# Patient Record
Sex: MALE | Race: WHITE | NOT HISPANIC OR LATINO
[De-identification: names, ages, dates, MRNs, and addresses within clinical notes are randomized per-mention and may not be internally consistent; named-entity substitution may affect disease eponyms.]

---

## 2018-01-11 ENCOUNTER — APPOINTMENT (OUTPATIENT)
Dept: UROLOGY | Facility: CLINIC | Age: 78
End: 2018-01-11
Payer: MEDICARE

## 2018-01-11 DIAGNOSIS — R97.20 ELEVATED PROSTATE, SPECIFIC ANTIGEN [PSA]: ICD-10-CM

## 2018-01-11 PROCEDURE — 99214 OFFICE O/P EST MOD 30 MIN: CPT

## 2018-01-12 LAB
APPEARANCE: CLEAR
BACTERIA: NEGATIVE
BILIRUBIN URINE: NEGATIVE
BLOOD URINE: NEGATIVE
COLOR: YELLOW
GLUCOSE QUALITATIVE U: 100 MG/DL
HYALINE CASTS: 0 /LPF
KETONES URINE: ABNORMAL
LEUKOCYTE ESTERASE URINE: NEGATIVE
MICROSCOPIC-UA: NORMAL
NITRITE URINE: NEGATIVE
PH URINE: 5
PROTEIN URINE: NEGATIVE MG/DL
PSA FREE FLD-MCNC: 11
PSA FREE SERPL-MCNC: 3.07 NG/ML
PSA SERPL-MCNC: 27.82 NG/ML
RED BLOOD CELLS URINE: 2 /HPF
SPECIFIC GRAVITY URINE: 1.03
SQUAMOUS EPITHELIAL CELLS: 1 /HPF
UROBILINOGEN URINE: NEGATIVE MG/DL
WHITE BLOOD CELLS URINE: 1 /HPF

## 2018-01-13 LAB — CORE LAB FLUID CYTOLOGY: NORMAL

## 2018-01-25 ENCOUNTER — APPOINTMENT (OUTPATIENT)
Dept: UROLOGY | Facility: CLINIC | Age: 78
End: 2018-01-25

## 2019-01-10 ENCOUNTER — APPOINTMENT (OUTPATIENT)
Dept: UROLOGY | Facility: CLINIC | Age: 79
End: 2019-01-10
Payer: MEDICARE

## 2019-01-10 PROCEDURE — 99214 OFFICE O/P EST MOD 30 MIN: CPT

## 2019-01-17 LAB
PSA FREE FLD-MCNC: 7 %
PSA FREE SERPL-MCNC: 6.05 NG/ML
PSA SERPL-MCNC: 88.65 NG/ML

## 2019-02-04 ENCOUNTER — APPOINTMENT (OUTPATIENT)
Dept: DERMATOLOGY | Facility: CLINIC | Age: 79
End: 2019-02-04
Payer: MEDICARE

## 2019-02-04 VITALS — HEIGHT: 70 IN | BODY MASS INDEX: 32.93 KG/M2 | WEIGHT: 230 LBS

## 2019-02-04 DIAGNOSIS — Z00.00 ENCOUNTER FOR GENERAL ADULT MEDICAL EXAMINATION W/OUT ABNORMAL FINDINGS: ICD-10-CM

## 2019-02-04 PROCEDURE — 99213 OFFICE O/P EST LOW 20 MIN: CPT

## 2019-02-04 PROCEDURE — 99203 OFFICE O/P NEW LOW 30 MIN: CPT

## 2019-02-04 NOTE — HISTORY OF PRESENT ILLNESS
[FreeTextEntry1] : Rash. [de-identified] : Diffusely, on the trunk, worse with the cold weather, but exacerbating/remitting course over the past year.  Self tx with aquaphor and hydrocortisone without relief.

## 2019-02-04 NOTE — ASSESSMENT
[FreeTextEntry1] : Eczema\par Cutivate ointment bid\par Aquaphor bid more liberally.\par f/u in 1 month.

## 2019-02-04 NOTE — PHYSICAL EXAM
[Alert] : alert [Oriented x 3] : ~L oriented x 3 [Well Nourished] : well nourished [Full Body Skin Exam Performed] : performed [Eyelids] : Eyelids [Ears] : Ears [Lips] : Lips [Neck] : Neck [FreeTextEntry3] : eczematous patches of the back, shoulder, chest, with mild involvement of the UE's and LE's.

## 2019-02-28 ENCOUNTER — APPOINTMENT (OUTPATIENT)
Dept: DERMATOLOGY | Facility: CLINIC | Age: 79
End: 2019-02-28
Payer: MEDICARE

## 2019-02-28 DIAGNOSIS — L08.9 LOCAL INFECTION OF THE SKIN AND SUBCUTANEOUS TISSUE, UNSPECIFIED: ICD-10-CM

## 2019-02-28 PROCEDURE — 17110 DESTRUCTION B9 LES UP TO 14: CPT

## 2019-02-28 PROCEDURE — 99213 OFFICE O/P EST LOW 20 MIN: CPT | Mod: 25

## 2019-02-28 RX ORDER — METHYLPREDNISOLONE 4 MG/1
4 TABLET ORAL
Qty: 21 | Refills: 0 | Status: COMPLETED | COMMUNITY
Start: 2018-12-28

## 2019-02-28 RX ORDER — BLOOD SUGAR DIAGNOSTIC
STRIP MISCELLANEOUS
Qty: 300 | Refills: 0 | Status: COMPLETED | COMMUNITY
Start: 2018-03-14

## 2019-02-28 RX ORDER — FLUTICASONE PROPIONATE 50 UG/1
50 SPRAY, METERED NASAL
Qty: 16 | Refills: 0 | Status: COMPLETED | COMMUNITY
Start: 2018-12-28

## 2019-02-28 RX ORDER — AMOXICILLIN 500 MG/1
500 CAPSULE ORAL
Qty: 30 | Refills: 0 | Status: COMPLETED | COMMUNITY
Start: 2018-12-28

## 2019-02-28 NOTE — ASSESSMENT
[FreeTextEntry1] : 1) rash/eczema-\par -c/w fluticasone PRN\par \par 2) skin infection- areas appears impetiginized\par -start doxycycline 100 mg PO BID x10 days; SED\par -culture taken\par -given instructions to go to ED if worsening, if fevers, or trouble swallowing; patient verbalized understanding\par \par 3) ISK-\par The specified lesions were treated with liquid nitrogen cryotherapy.  Discussed risks including pain, blistering, crusting, discoloration, recurrence.\par

## 2019-02-28 NOTE — HISTORY OF PRESENT ILLNESS
[FreeTextEntry1] : possible infection [de-identified] : patient here for f.u rash. having improvement fluticasone but recently noticed some pain and pus on left cheek. feels it is tender.\par also with irritated brown plaques on body.

## 2019-02-28 NOTE — PHYSICAL EXAM
[Alert] : alert [Oriented x 3] : ~L oriented x 3 [Well Nourished] : well nourished [Full Body Skin Exam Performed] : performed [Eyelids] : Eyelids [Ears] : Ears [Lips] : Lips [Neck] : Neck [FreeTextEntry3] : eczematous patches of the back, shoulder, chest, with mild involvement of the UE's and LE's.\par tender plaque on left cheek with some pus\par + inflamed, waxy, keratotic papule on right shoulder

## 2019-03-02 ENCOUNTER — INPATIENT (INPATIENT)
Facility: HOSPITAL | Age: 79
LOS: 3 days | Discharge: HOME | End: 2019-03-06
Attending: FAMILY MEDICINE | Admitting: FAMILY MEDICINE
Payer: MEDICARE

## 2019-03-02 VITALS — WEIGHT: 229.94 LBS

## 2019-03-02 DIAGNOSIS — L03.211 CELLULITIS OF FACE: ICD-10-CM

## 2019-03-02 DIAGNOSIS — R73.09 OTHER ABNORMAL GLUCOSE: ICD-10-CM

## 2019-03-02 LAB
ALBUMIN SERPL ELPH-MCNC: 3.7 G/DL — SIGNIFICANT CHANGE UP (ref 3.3–5)
ALP SERPL-CCNC: 60 U/L — SIGNIFICANT CHANGE UP (ref 40–120)
ALT FLD-CCNC: 36 U/L — SIGNIFICANT CHANGE UP (ref 12–78)
ANION GAP SERPL CALC-SCNC: 5 MMOL/L — SIGNIFICANT CHANGE UP (ref 5–17)
APPEARANCE UR: CLEAR — SIGNIFICANT CHANGE UP
APTT BLD: 31.3 SEC — SIGNIFICANT CHANGE UP (ref 27.5–36.3)
AST SERPL-CCNC: 22 U/L — SIGNIFICANT CHANGE UP (ref 15–37)
BACTERIA # UR AUTO: ABNORMAL
BASOPHILS # BLD AUTO: 0.05 K/UL — SIGNIFICANT CHANGE UP (ref 0–0.2)
BASOPHILS NFR BLD AUTO: 0.9 % — SIGNIFICANT CHANGE UP (ref 0–2)
BILIRUB SERPL-MCNC: 1.5 MG/DL — HIGH (ref 0.2–1.2)
BILIRUB UR-MCNC: NEGATIVE — SIGNIFICANT CHANGE UP
BUN SERPL-MCNC: 17 MG/DL — SIGNIFICANT CHANGE UP (ref 7–23)
CALCIUM SERPL-MCNC: 8.5 MG/DL — SIGNIFICANT CHANGE UP (ref 8.5–10.1)
CHLORIDE SERPL-SCNC: 102 MMOL/L — SIGNIFICANT CHANGE UP (ref 96–108)
CO2 SERPL-SCNC: 31 MMOL/L — SIGNIFICANT CHANGE UP (ref 22–31)
COLOR SPEC: YELLOW — SIGNIFICANT CHANGE UP
CREAT SERPL-MCNC: 1.28 MG/DL — SIGNIFICANT CHANGE UP (ref 0.5–1.3)
DIFF PNL FLD: NEGATIVE — SIGNIFICANT CHANGE UP
EOSINOPHIL # BLD AUTO: 0.1 K/UL — SIGNIFICANT CHANGE UP (ref 0–0.5)
EOSINOPHIL NFR BLD AUTO: 1.7 % — SIGNIFICANT CHANGE UP (ref 0–6)
EPI CELLS # UR: SIGNIFICANT CHANGE UP
GLUCOSE BLDC GLUCOMTR-MCNC: 116 MG/DL — HIGH (ref 70–99)
GLUCOSE SERPL-MCNC: 135 MG/DL — HIGH (ref 70–99)
GLUCOSE UR QL: 250 MG/DL
HCT VFR BLD CALC: 49.8 % — SIGNIFICANT CHANGE UP (ref 39–50)
HGB BLD-MCNC: 16.1 G/DL — SIGNIFICANT CHANGE UP (ref 13–17)
IMM GRANULOCYTES NFR BLD AUTO: 0.3 % — SIGNIFICANT CHANGE UP (ref 0–1.5)
INR BLD: 1.03 RATIO — SIGNIFICANT CHANGE UP (ref 0.88–1.16)
KETONES UR-MCNC: ABNORMAL
LACTATE SERPL-SCNC: 1.8 MMOL/L — SIGNIFICANT CHANGE UP (ref 0.7–2)
LEUKOCYTE ESTERASE UR-ACNC: NEGATIVE — SIGNIFICANT CHANGE UP
LYMPHOCYTES # BLD AUTO: 0.68 K/UL — LOW (ref 1–3.3)
LYMPHOCYTES # BLD AUTO: 11.6 % — LOW (ref 13–44)
MCHC RBC-ENTMCNC: 31.1 PG — SIGNIFICANT CHANGE UP (ref 27–34)
MCHC RBC-ENTMCNC: 32.3 GM/DL — SIGNIFICANT CHANGE UP (ref 32–36)
MCV RBC AUTO: 96.3 FL — SIGNIFICANT CHANGE UP (ref 80–100)
MONOCYTES # BLD AUTO: 0.92 K/UL — HIGH (ref 0–0.9)
MONOCYTES NFR BLD AUTO: 15.7 % — HIGH (ref 2–14)
NEUTROPHILS # BLD AUTO: 4.1 K/UL — SIGNIFICANT CHANGE UP (ref 1.8–7.4)
NEUTROPHILS NFR BLD AUTO: 69.8 % — SIGNIFICANT CHANGE UP (ref 43–77)
NITRITE UR-MCNC: NEGATIVE — SIGNIFICANT CHANGE UP
NRBC # BLD: 0 /100 WBCS — SIGNIFICANT CHANGE UP (ref 0–0)
PH UR: 6 — SIGNIFICANT CHANGE UP (ref 5–8)
PLATELET # BLD AUTO: 177 K/UL — SIGNIFICANT CHANGE UP (ref 150–400)
POTASSIUM SERPL-MCNC: 4.3 MMOL/L — SIGNIFICANT CHANGE UP (ref 3.5–5.3)
POTASSIUM SERPL-SCNC: 4.3 MMOL/L — SIGNIFICANT CHANGE UP (ref 3.5–5.3)
PROT SERPL-MCNC: 7.6 GM/DL — SIGNIFICANT CHANGE UP (ref 6–8.3)
PROT UR-MCNC: 30 MG/DL
PROTHROM AB SERPL-ACNC: 11.4 SEC — SIGNIFICANT CHANGE UP (ref 10–12.9)
RBC # BLD: 5.17 M/UL — SIGNIFICANT CHANGE UP (ref 4.2–5.8)
RBC # FLD: 14.2 % — SIGNIFICANT CHANGE UP (ref 10.3–14.5)
RBC CASTS # UR COMP ASSIST: SIGNIFICANT CHANGE UP /HPF (ref 0–4)
SODIUM SERPL-SCNC: 138 MMOL/L — SIGNIFICANT CHANGE UP (ref 135–145)
SP GR SPEC: 1.02 — SIGNIFICANT CHANGE UP (ref 1.01–1.02)
UROBILINOGEN FLD QL: NEGATIVE MG/DL — SIGNIFICANT CHANGE UP
WBC # BLD: 5.87 K/UL — SIGNIFICANT CHANGE UP (ref 3.8–10.5)
WBC # FLD AUTO: 5.87 K/UL — SIGNIFICANT CHANGE UP (ref 3.8–10.5)
WBC UR QL: SIGNIFICANT CHANGE UP

## 2019-03-02 PROCEDURE — 93010 ELECTROCARDIOGRAM REPORT: CPT

## 2019-03-02 PROCEDURE — 99284 EMERGENCY DEPT VISIT MOD MDM: CPT

## 2019-03-02 RX ORDER — DEXTROSE 50 % IN WATER 50 %
12.5 SYRINGE (ML) INTRAVENOUS ONCE
Qty: 0 | Refills: 0 | Status: DISCONTINUED | OUTPATIENT
Start: 2019-03-02 | End: 2019-03-04

## 2019-03-02 RX ORDER — VANCOMYCIN HCL 1 G
1000 VIAL (EA) INTRAVENOUS ONCE
Qty: 0 | Refills: 0 | Status: COMPLETED | OUTPATIENT
Start: 2019-03-02 | End: 2019-03-02

## 2019-03-02 RX ORDER — DEXTROSE 50 % IN WATER 50 %
25 SYRINGE (ML) INTRAVENOUS ONCE
Qty: 0 | Refills: 0 | Status: DISCONTINUED | OUTPATIENT
Start: 2019-03-02 | End: 2019-03-04

## 2019-03-02 RX ORDER — HEPARIN SODIUM 5000 [USP'U]/ML
5000 INJECTION INTRAVENOUS; SUBCUTANEOUS EVERY 12 HOURS
Qty: 0 | Refills: 0 | Status: DISCONTINUED | OUTPATIENT
Start: 2019-03-02 | End: 2019-03-06

## 2019-03-02 RX ORDER — GLUCAGON INJECTION, SOLUTION 0.5 MG/.1ML
1 INJECTION, SOLUTION SUBCUTANEOUS ONCE
Qty: 0 | Refills: 0 | Status: DISCONTINUED | OUTPATIENT
Start: 2019-03-02 | End: 2019-03-06

## 2019-03-02 RX ORDER — SODIUM CHLORIDE 9 MG/ML
1000 INJECTION, SOLUTION INTRAVENOUS
Qty: 0 | Refills: 0 | Status: DISCONTINUED | OUTPATIENT
Start: 2019-03-02 | End: 2019-03-04

## 2019-03-02 RX ORDER — SODIUM CHLORIDE 9 MG/ML
1000 INJECTION INTRAMUSCULAR; INTRAVENOUS; SUBCUTANEOUS
Qty: 0 | Refills: 0 | Status: DISCONTINUED | OUTPATIENT
Start: 2019-03-02 | End: 2019-03-04

## 2019-03-02 RX ORDER — DEXTROSE 50 % IN WATER 50 %
15 SYRINGE (ML) INTRAVENOUS ONCE
Qty: 0 | Refills: 0 | Status: DISCONTINUED | OUTPATIENT
Start: 2019-03-02 | End: 2019-03-04

## 2019-03-02 RX ADMIN — Medication 1000 MILLIGRAM(S): at 18:55

## 2019-03-02 RX ADMIN — Medication 250 MILLIGRAM(S): at 17:55

## 2019-03-02 NOTE — ED STATDOCS - PROGRESS NOTE DETAILS
78 yr. old male PMH: Eczema, Skull Fracture 1970, Type 2 Diabetes presents to ED with pruritic rash ,pain and swelling to left side of face and ear, onset 3 days ago. Reports eczema started on back and now thighs and both arms. Seen by Dr. Tsai's PA and Rx. Doxycycline on Thursday. No fever or chills. Seen and examined by attending in Intake. Plan: IV,IVF, labs, Vancomycin. Will F/U with results and re evaluate. Clarisse RUIZ 78 yr. old male PMH: Eczema, Skull Fracture 1971, Type 2 Diabetes presents to ED with pruritic rash ,pain and swelling to left side of face and ear, onset 3 days ago. Reports eczema started on back and now thighs and both arms. Seen by Dr. Tsai's PA and Rx. Doxycycline on Thursday. No fever + chills. Seen and examined by attending in Intake. Plan: IV,IVF, labs, Vancomycin. Will F/U with results and re evaluate. Clarisse RUIZ

## 2019-03-02 NOTE — H&P ADULT - NSHPPHYSICALEXAM_GEN_ALL_CORE
Physical Exam: Vital Signs Last 24 Hrs  T(C): 37.1 (02 Mar 2019 19:00), Max: 37.1 (02 Mar 2019 16:35)  T(F): 98.8 (02 Mar 2019 19:00), Max: 98.8 (02 Mar 2019 19:00)  HR: 84 (02 Mar 2019 19:00) (84 - 97)  BP: 142/86 (02 Mar 2019 19:00) (142/86 - 148/91)  BP(mean): --  RR: 16 (02 Mar 2019 19:00) (16 - 16)  SpO2: 96% (02 Mar 2019 19:00) (95% - 96%)        HEENT: PRRL EOMI    MOUTH/TEETH/GUMS: Clear    NECK: no JVD    LUNGS: Clear    HEART: S1,S2 RR    ABDOMEN: soft nontender    EXTREMITIES: no pedal edema.     MUSCULOSKELETAL: no  joint swelling     NEURO: no tremor, no focal signs.    SKIN: rash, blisters on L side face, swollen L earlobe.     : CVA negative,

## 2019-03-02 NOTE — H&P ADULT - ASSESSMENT
79 yo male with Hx. of  cellulitis shins a year ago, seb. dermatitis, who was seen by  on 2/4/19 and stared on Fluticasone 0.005% ointment for seb. dermatitis develped worsening of rash on face and L side neck. He wentback to dermatology and was started on po Doxycycline on 2/28/19 and was told if his symptoms get worse to come to the ER. Today he came to the ER because of worsening rash on L side face ,swollen L earlobe, neck rash.

## 2019-03-02 NOTE — ED STATDOCS - OBJECTIVE STATEMENT
77 y/o male with a PMHx of Eczema on Topical Steroids presents to the ED c/o left ear pain, erythema near the left ear x 3 days. Pt states that he was seen by his PCP, was given Doxycycline for a left ear infection. States that he has been taking the abx for two days. Pt also states that he is pre-diabetic and states that his BGM has been rising daily. BGM is usually in the 120s, states that today it was in the 150s.

## 2019-03-02 NOTE — ED ADULT NURSE NOTE - OBJECTIVE STATEMENT
Pt presents to ED for cellulitis to left side of face. Pt states he has been treated for eczema with a medication called flutcasone, but to day presents with cellultitis like symptoms. Pt states he feels like he has an ear infection, feels tired,  has weeping from the area. Pt states he was seen by his MD and palced on doxycycline 2 days ago with no relief of symptoms. Site appears to be reddened with yellow pockets. Denies any pain to area. Denies any fever, but had chills. Denies cough, n/v/d.

## 2019-03-02 NOTE — H&P ADULT - NSHPLABSRESULTS_GEN_ALL_CORE
16.1   5.87  )-----------( 177      ( 02 Mar 2019 17:25 )             49.8       03-02    138  |  102  |  17  ----------------------------<  135<H>  4.3   |  31  |  1.28    Ca    8.5      02 Mar 2019 17:25    TPro  7.6  /  Alb  3.7  /  TBili  1.5<H>  /  DBili  x   /  AST  22  /  ALT  36  /  AlkPhos  60  03-02

## 2019-03-02 NOTE — H&P ADULT - HISTORY OF PRESENT ILLNESS
The patient is a 77 yo male with Hx. of  cellulitis shins a year ago, seb. dermatitis, who was seen by  on 19 and stared on Fluticasone 0.005% ointment for seb. dermatitis develped worsening of rash on face and L side neck. He wentback to dermatology and was started on po Doxycycline on 19 and was told if his symptoms get worse to come to the ER. Today he came to the ER because of worsening rash on L side face ,swollen L earlobe, neck rash.       Family Hx. : father  of Liver CA in his 80, mother  of old age.

## 2019-03-02 NOTE — ED ADULT NURSE NOTE - NSIMPLEMENTINTERV_GEN_ALL_ED
Implemented All Universal Safety Interventions:  [place] to call system. Call bell, personal items and telephone within reach. Instruct patient to call for assistance. Room bathroom lighting operational. Non-slip footwear when patient is off stretcher. Physically safe environment: no spills, clutter or unnecessary equipment. Stretcher in lowest position, wheels locked, appropriate side rails in place.

## 2019-03-02 NOTE — ED STATDOCS - CLINICAL SUMMARY MEDICAL DECISION MAKING FREE TEXT BOX
Basic blood work, dose of Vancomycin. Admission if worsening symptoms as pt has been taking doxy for 2 days.

## 2019-03-02 NOTE — ED STATDOCS - NS_ ATTENDINGSCRIBEDETAILS _ED_A_ED_FT
I Mars Farooq MD saw and examined the patient. Scribe documented for me and under my supervision. I have modified the scribe's documentation where necessary to reflect my history, physical exam and other relevant documentations pertinent to the care of the patient.

## 2019-03-02 NOTE — ED STATDOCS - SKIN, MLM
skin normal color for race, warm, dry and intact. + eczema on left ear, left neck and left face, superficial honey crusting consistent with cellulites, dolor, pallor, luber on the left ear and left face skin normal color for race, warm, dry and intact. + eczema on left ear, left neck and left face, superficial honey crusting consistent with cellulites, dolor, pallor, rubor on the left ear and left face

## 2019-03-02 NOTE — PATIENT PROFILE ADULT - NSPROEDALEARNPREF_GEN_A_NUR
verbal instruction/written material/video/individual instruction/skill demonstration/audio/computer/internet

## 2019-03-02 NOTE — ED STATDOCS - ATTENDING CONTRIBUTION TO CARE
I Mars Farooq MD saw and examined the patient. MLP saw and examined the patient under my supervision. I discussed the care of the patient with MLP and agree with MLP's plan, assessment and care of the patient while in the ED.

## 2019-03-02 NOTE — H&P ADULT - NSHPREVIEWOFSYSTEMS_GEN_ALL_CORE
Review of Systems: as above,     Eyes: no changes in vision    ENT/Mouth: no changes    Cardiovascular: no chest pain    Respiratory: no SOB    Gastrointestinal: no diarrhea, no nausea, no vomiting    Genitourinary: no dysuria    Breast: no pain    Musculoskeletal: no pain    Integumentary: rash , itching on L side of his face.     Neurological: No Headache, no tremor,    Psychiatric: no suicidal ideations    Endocrine: no excessive thirst,     Hematologic/Lymphatic: no swollen glands    Allergic/Immunologic: no itching

## 2019-03-02 NOTE — ED ADULT TRIAGE NOTE - CHIEF COMPLAINT QUOTE
Pt states that he has been treated for eczema with a medication called flutcasone. pt states he feels like he has an ear infection, feels tired,  has weeping from his ezcema site. pt states he was seenby his MD and palced on doxycycline 2 days ago with no releif of symptoms

## 2019-03-03 LAB
ANION GAP SERPL CALC-SCNC: 7 MMOL/L — SIGNIFICANT CHANGE UP (ref 5–17)
BASOPHILS # BLD AUTO: 0.05 K/UL — SIGNIFICANT CHANGE UP (ref 0–0.2)
BASOPHILS NFR BLD AUTO: 1 % — SIGNIFICANT CHANGE UP (ref 0–2)
BUN SERPL-MCNC: 15 MG/DL — SIGNIFICANT CHANGE UP (ref 7–23)
CALCIUM SERPL-MCNC: 8.2 MG/DL — LOW (ref 8.5–10.1)
CHLORIDE SERPL-SCNC: 103 MMOL/L — SIGNIFICANT CHANGE UP (ref 96–108)
CO2 SERPL-SCNC: 29 MMOL/L — SIGNIFICANT CHANGE UP (ref 22–31)
CREAT SERPL-MCNC: 1.1 MG/DL — SIGNIFICANT CHANGE UP (ref 0.5–1.3)
CULTURE RESULTS: NO GROWTH — SIGNIFICANT CHANGE UP
EOSINOPHIL # BLD AUTO: 0.05 K/UL — SIGNIFICANT CHANGE UP (ref 0–0.5)
EOSINOPHIL NFR BLD AUTO: 1 % — SIGNIFICANT CHANGE UP (ref 0–6)
GLUCOSE BLDC GLUCOMTR-MCNC: 104 MG/DL — HIGH (ref 70–99)
GLUCOSE BLDC GLUCOMTR-MCNC: 109 MG/DL — HIGH (ref 70–99)
GLUCOSE BLDC GLUCOMTR-MCNC: 114 MG/DL — HIGH (ref 70–99)
GLUCOSE BLDC GLUCOMTR-MCNC: 116 MG/DL — HIGH (ref 70–99)
GLUCOSE SERPL-MCNC: 116 MG/DL — HIGH (ref 70–99)
HBA1C BLD-MCNC: 6.8 % — HIGH (ref 4–5.6)
HCT VFR BLD CALC: 44.9 % — SIGNIFICANT CHANGE UP (ref 39–50)
HGB BLD-MCNC: 14.5 G/DL — SIGNIFICANT CHANGE UP (ref 13–17)
LYMPHOCYTES # BLD AUTO: 0.63 K/UL — LOW (ref 1–3.3)
LYMPHOCYTES # BLD AUTO: 13 % — SIGNIFICANT CHANGE UP (ref 13–44)
MANUAL SMEAR VERIFICATION: SIGNIFICANT CHANGE UP
MCHC RBC-ENTMCNC: 31 PG — SIGNIFICANT CHANGE UP (ref 27–34)
MCHC RBC-ENTMCNC: 32.3 GM/DL — SIGNIFICANT CHANGE UP (ref 32–36)
MCV RBC AUTO: 96.1 FL — SIGNIFICANT CHANGE UP (ref 80–100)
MONOCYTES # BLD AUTO: 1.26 K/UL — HIGH (ref 0–0.9)
MONOCYTES NFR BLD AUTO: 26 % — HIGH (ref 2–14)
NEUTROPHILS # BLD AUTO: 2.86 K/UL — SIGNIFICANT CHANGE UP (ref 1.8–7.4)
NEUTROPHILS NFR BLD AUTO: 59 % — SIGNIFICANT CHANGE UP (ref 43–77)
NRBC # BLD: 0 /100 — SIGNIFICANT CHANGE UP (ref 0–0)
NRBC # BLD: SIGNIFICANT CHANGE UP /100 WBCS (ref 0–0)
PLAT MORPH BLD: NORMAL — SIGNIFICANT CHANGE UP
PLATELET # BLD AUTO: 156 K/UL — SIGNIFICANT CHANGE UP (ref 150–400)
POTASSIUM SERPL-MCNC: 4.2 MMOL/L — SIGNIFICANT CHANGE UP (ref 3.5–5.3)
POTASSIUM SERPL-SCNC: 4.2 MMOL/L — SIGNIFICANT CHANGE UP (ref 3.5–5.3)
RBC # BLD: 4.67 M/UL — SIGNIFICANT CHANGE UP (ref 4.2–5.8)
RBC # FLD: 14.2 % — SIGNIFICANT CHANGE UP (ref 10.3–14.5)
RBC BLD AUTO: NORMAL — SIGNIFICANT CHANGE UP
SODIUM SERPL-SCNC: 139 MMOL/L — SIGNIFICANT CHANGE UP (ref 135–145)
SPECIMEN SOURCE: SIGNIFICANT CHANGE UP
WBC # BLD: 4.85 K/UL — SIGNIFICANT CHANGE UP (ref 3.8–10.5)
WBC # FLD AUTO: 4.85 K/UL — SIGNIFICANT CHANGE UP (ref 3.8–10.5)

## 2019-03-03 RX ORDER — AMPICILLIN SODIUM AND SULBACTAM SODIUM 250; 125 MG/ML; MG/ML
INJECTION, POWDER, FOR SUSPENSION INTRAMUSCULAR; INTRAVENOUS
Qty: 0 | Refills: 0 | Status: DISCONTINUED | OUTPATIENT
Start: 2019-03-03 | End: 2019-03-06

## 2019-03-03 RX ORDER — VANCOMYCIN HCL 1 G
1000 VIAL (EA) INTRAVENOUS EVERY 12 HOURS
Qty: 0 | Refills: 0 | Status: DISCONTINUED | OUTPATIENT
Start: 2019-03-04 | End: 2019-03-06

## 2019-03-03 RX ORDER — AMPICILLIN SODIUM AND SULBACTAM SODIUM 250; 125 MG/ML; MG/ML
3 INJECTION, POWDER, FOR SUSPENSION INTRAMUSCULAR; INTRAVENOUS EVERY 6 HOURS
Qty: 0 | Refills: 0 | Status: DISCONTINUED | OUTPATIENT
Start: 2019-03-03 | End: 2019-03-06

## 2019-03-03 RX ORDER — AMPICILLIN SODIUM AND SULBACTAM SODIUM 250; 125 MG/ML; MG/ML
3 INJECTION, POWDER, FOR SUSPENSION INTRAMUSCULAR; INTRAVENOUS ONCE
Qty: 0 | Refills: 0 | Status: COMPLETED | OUTPATIENT
Start: 2019-03-03 | End: 2019-03-03

## 2019-03-03 RX ORDER — VANCOMYCIN HCL 1 G
VIAL (EA) INTRAVENOUS
Qty: 0 | Refills: 0 | Status: DISCONTINUED | OUTPATIENT
Start: 2019-03-03 | End: 2019-03-06

## 2019-03-03 RX ORDER — VANCOMYCIN HCL 1 G
1000 VIAL (EA) INTRAVENOUS ONCE
Qty: 0 | Refills: 0 | Status: COMPLETED | OUTPATIENT
Start: 2019-03-03 | End: 2019-03-03

## 2019-03-03 RX ADMIN — Medication 250 MILLIGRAM(S): at 15:24

## 2019-03-03 RX ADMIN — AMPICILLIN SODIUM AND SULBACTAM SODIUM 200 GRAM(S): 250; 125 INJECTION, POWDER, FOR SUSPENSION INTRAMUSCULAR; INTRAVENOUS at 20:14

## 2019-03-03 RX ADMIN — HEPARIN SODIUM 5000 UNIT(S): 5000 INJECTION INTRAVENOUS; SUBCUTANEOUS at 17:09

## 2019-03-03 RX ADMIN — SODIUM CHLORIDE 75 MILLILITER(S): 9 INJECTION INTRAMUSCULAR; INTRAVENOUS; SUBCUTANEOUS at 10:30

## 2019-03-03 RX ADMIN — AMPICILLIN SODIUM AND SULBACTAM SODIUM 200 GRAM(S): 250; 125 INJECTION, POWDER, FOR SUSPENSION INTRAMUSCULAR; INTRAVENOUS at 14:40

## 2019-03-03 RX ADMIN — HEPARIN SODIUM 5000 UNIT(S): 5000 INJECTION INTRAVENOUS; SUBCUTANEOUS at 05:37

## 2019-03-03 NOTE — CONSULT NOTE ADULT - SUBJECTIVE AND OBJECTIVE BOX
Patient is a 78y old  Male who presents with a chief complaint of cellulitis face. (03 Mar 2019 15:06)    HPI:  The patient is a 79 yo male with Hx. of  cellulitis shins a year ago, seb. dermatitis, who was seen by  on 19 and stared on Fluticasone 0.005% ointment for seb. dermatitis develped worsening of rash on face and L side neck. He wentback to dermatology and was started on po Doxycycline on 19 and was told if his symptoms get worse to come to the ER. 3/2 he came to the ER because of worsening rash on L side face ,swollen L earlobe, neck rash here was given IV vancomycin w/ some noted improvement. No fevers/chills.       PMH: as above  PSH: as above  Meds: per reconciliation sheet, noted below  MEDICATIONS  (STANDING):  ampicillin/sulbactam  IVPB      ampicillin/sulbactam  IVPB 3 Gram(s) IV Intermittent every 6 hours  dextrose 5%. 1000 milliLiter(s) (50 mL/Hr) IV Continuous <Continuous>  dextrose 50% Injectable 12.5 Gram(s) IV Push once  dextrose 50% Injectable 25 Gram(s) IV Push once  dextrose 50% Injectable 25 Gram(s) IV Push once  heparin  Injectable 5000 Unit(s) SubCutaneous every 12 hours  sodium chloride 0.9%. 1000 milliLiter(s) (75 mL/Hr) IV Continuous <Continuous>  vancomycin  IVPB        Allergies    albuterol (Unknown)    Intolerances      Social: no smoking, no alcohol, no illegal drugs; no recent travel, no exposure to TB  FAMILY HISTORY:     no history of premature cardiovascular disease in first degree relatives    ROS: the patient denies fever, no chills, no HA, no dizziness, no sore throat, no blurry vision, no CP, no palpitations, no SOB, no cough, no abdominal pain, no diarrhea, no N/V, no dysuria, no leg pain, no claudication, no joint aches, no rectal pain or bleeding, no night sweats  All other systems reviewed and are negative    Vital Signs Last 24 Hrs  T(C): 37.1 (03 Mar 2019 13:08), Max: 37.2 (03 Mar 2019 05:08)  T(F): 98.7 (03 Mar 2019 13:08), Max: 98.9 (03 Mar 2019 05:08)  HR: 77 (03 Mar 2019 13:08) (77 - 95)  BP: 114/65 (03 Mar 2019 13:08) (114/65 - 142/65)  BP(mean): --  RR: 16 (03 Mar 2019 13:08) (16 - 17)  SpO2: 96% (03 Mar 2019 13:08) (93% - 96%)  Daily     Daily     PE:    Constitutional: frail looking  HEENT: NC/AT, EOMI, PERRLA, conjunctivae clear; ears and nose atraumatic; pharynx benign  Neck: supple; thyroid not palpable  Back: no tenderness  Respiratory: respiratory effort normal; clear to auscultation  Cardiovascular: S1S2 regular, no murmurs  Abdomen: soft, not tender, not distended, positive BS; liver and spleen WNL  Genitourinary: no suprapubic tenderness  Lymphatic: no LN palpable  Musculoskeletal: no muscle tenderness, no joint swelling or tenderness  Extremities: no pedal edema  Neurological/ Psychiatric: AxOx3, Judgement and insight normal;  moving all extremities  Skin: L facial patchy/cracking skin with surrounding erythema extending to L ear/neck with edema/warmth    Labs: all available labs reviewed                        14.5   4.85  )-----------( 156      ( 03 Mar 2019 06:14 )             44.9         139  |  103  |  15  ----------------------------<  116<H>  4.2   |  29  |  1.10    Ca    8.2<L>      03 Mar 2019 06:14    TPro  7.6  /  Alb  3.7  /  TBili  1.5<H>  /  DBili  x   /  AST  22  /  ALT  36  /  AlkPhos  60  03-02     LIVER FUNCTIONS - ( 02 Mar 2019 17:25 )  Alb: 3.7 g/dL / Pro: 7.6 gm/dL / ALK PHOS: 60 U/L / ALT: 36 U/L / AST: 22 U/L / GGT: x           Urinalysis Basic - ( 02 Mar 2019 17:34 )    Color: Yellow / Appearance: Clear / S.020 / pH: x  Gluc: x / Ketone: Trace  / Bili: Negative / Urobili: Negative mg/dL   Blood: x / Protein: 30 mg/dL / Nitrite: Negative   Leuk Esterase: Negative / RBC: 0-2 /HPF / WBC 0-2   Sq Epi: x / Non Sq Epi: Occasional / Bacteria: Occasional          Radiology: all available radiological tests reviewed    Advanced directives addressed: full resuscitation

## 2019-03-03 NOTE — PROGRESS NOTE ADULT - SUBJECTIVE AND OBJECTIVE BOX
The patient is a 79 yo male with Hx. of  cellulitis shins a year ago, seb. dermatitis, who was seen by  on 2/4/19 and stared on Fluticasone 0.005% ointment for seb. dermatitis develped worsening of rash on face and L side neck. He wentback to dermatology and was started on po Doxycycline on 2/28/19 and was told if his symptoms get worse to come to the ER. Today he came to the ER because of worsening rash on L side face ,swollen L earlobe, neck rash    3/3: marked improvement since yesterday    Vital Signs Last 24 Hrs  T(C): 37.1 (03 Mar 2019 13:08), Max: 37.2 (03 Mar 2019 05:08)  T(F): 98.7 (03 Mar 2019 13:08), Max: 98.9 (03 Mar 2019 05:08)  HR: 77 (03 Mar 2019 13:08) (77 - 97)  BP: 114/65 (03 Mar 2019 13:08) (114/65 - 148/91)  BP(mean): --  RR: 16 (03 Mar 2019 13:08) (16 - 17)  SpO2: 96% (03 Mar 2019 13:08) (93% - 96%) The patient is a 77 yo male with Hx. of  cellulitis shins a year ago, seb. dermatitis, who was seen by  on 2/4/19 and stared on Fluticasone 0.005% ointment for seb. dermatitis develped worsening of rash on face and L side neck. He wentback to dermatology and was started on po Doxycycline on 2/28/19 and was told if his symptoms get worse to come to the ER. Today he came to the ER because of worsening rash on L side face ,swollen L earlobe, neck rash    3/3: marked improvement since yesterday    Vital Signs Last 24 Hrs  T(C): 37.1 (03 Mar 2019 13:08), Max: 37.2 (03 Mar 2019 05:08)  T(F): 98.7 (03 Mar 2019 13:08), Max: 98.9 (03 Mar 2019 05:08)  HR: 77 (03 Mar 2019 13:08) (77 - 97)  BP: 114/65 (03 Mar 2019 13:08) (114/65 - 148/91)  BP(mean): --  RR: 16 (03 Mar 2019 13:08) (16 - 17)  SpO2: 96% (03 Mar 2019 13:08) (93% - 96%)      HEENT: NC/AT, EOMI, PERRLA,   Neck: supple; thyroid not palpable  Back: no tenderness  Respiratory: respiratory effort normal; clear to auscultation  Cardiovascular: S1S2 regular, no murmurs  Abdomen: soft, not tender, not distended, positive BS; liver and spleen WNL  Musculoskeletal: no muscle tenderness, no joint swelling or tenderness  Extremities: no pedal edema  Neurological/ Psychiatric: AxOx3, Judgement and insight normal;  moving all extremities  Skin: L facial patchy/cracking skin with surrounding erythema extending to L ear/neck with edema/warmth      * Cellulitis- scalp and neck  - responding to IV abx  - c/w present regimes  - dc BGM

## 2019-03-03 NOTE — CONSULT NOTE ADULT - ASSESSMENT
The patient is a 79 yo male with Hx. of  cellulitis shins a year ago, seb. dermatitis, who was seen by  on 2/4/19 and stared on Fluticasone 0.005% ointment for seb. dermatitis develped worsening of rash on face and L side neck. He wentback to dermatology and was started on po Doxycycline on 2/28/19 and was told if his symptoms get worse to come to the ER. 3/2 he came to the ER because of worsening rash on L side face ,swollen L earlobe, neck rash here was given IV vancomycin w/ some noted improvement. No fevers/chills.     1 L facial cellulitis. seborrheic dermatitis  - cellulitis extending to neck/ear  - agree with vancomycin [id number] check trough prior to 4th dose  - add unasyn for mssa/strep/gnr/anaroebic coverage  - f/u cultures  - monitor temps  - tolerating abx well so far; no side effects noted  - reason for abx use and side effects reviewed with patient  - fu cbc

## 2019-03-04 ENCOUNTER — APPOINTMENT (OUTPATIENT)
Dept: DERMATOLOGY | Facility: CLINIC | Age: 79
End: 2019-03-04

## 2019-03-04 LAB
GLUCOSE BLDC GLUCOMTR-MCNC: 128 MG/DL — HIGH (ref 70–99)
VANCOMYCIN TROUGH SERPL-MCNC: 11.9 UG/ML — SIGNIFICANT CHANGE UP (ref 10–20)

## 2019-03-04 RX ADMIN — AMPICILLIN SODIUM AND SULBACTAM SODIUM 200 GRAM(S): 250; 125 INJECTION, POWDER, FOR SUSPENSION INTRAMUSCULAR; INTRAVENOUS at 09:38

## 2019-03-04 RX ADMIN — HEPARIN SODIUM 5000 UNIT(S): 5000 INJECTION INTRAVENOUS; SUBCUTANEOUS at 05:01

## 2019-03-04 RX ADMIN — AMPICILLIN SODIUM AND SULBACTAM SODIUM 200 GRAM(S): 250; 125 INJECTION, POWDER, FOR SUSPENSION INTRAMUSCULAR; INTRAVENOUS at 14:15

## 2019-03-04 RX ADMIN — HEPARIN SODIUM 5000 UNIT(S): 5000 INJECTION INTRAVENOUS; SUBCUTANEOUS at 18:26

## 2019-03-04 RX ADMIN — AMPICILLIN SODIUM AND SULBACTAM SODIUM 200 GRAM(S): 250; 125 INJECTION, POWDER, FOR SUSPENSION INTRAMUSCULAR; INTRAVENOUS at 02:01

## 2019-03-04 RX ADMIN — Medication 250 MILLIGRAM(S): at 18:25

## 2019-03-04 RX ADMIN — AMPICILLIN SODIUM AND SULBACTAM SODIUM 200 GRAM(S): 250; 125 INJECTION, POWDER, FOR SUSPENSION INTRAMUSCULAR; INTRAVENOUS at 21:11

## 2019-03-04 RX ADMIN — Medication 250 MILLIGRAM(S): at 05:01

## 2019-03-04 RX ADMIN — SODIUM CHLORIDE 75 MILLILITER(S): 9 INJECTION INTRAMUSCULAR; INTRAVENOUS; SUBCUTANEOUS at 05:01

## 2019-03-04 NOTE — PROGRESS NOTE ADULT - ASSESSMENT
The patient is a 79 yo male with Hx. of  cellulitis shins a year ago, seb. dermatitis, who was seen by  on 2/4/19 and stared on Fluticasone 0.005% ointment for seb. dermatitis develped worsening of rash on face and L side neck. He wentback to dermatology and was started on po Doxycycline on 2/28/19 and was told if his symptoms get worse to come to the ER. 3/2 he came to the ER because of worsening rash on L side face ,swollen L earlobe, neck rash here was given IV vancomycin w/ some noted improvement. No fevers/chills.     1 L facial cellulitis. seborrheic dermatitis  - slowly improving   - cellulitis extending to neck/ear  - on vancomycin [id number] check trough prior to 4th dose #2  - on unasyn for mssa/strep/gnr/anaroebic coverage #2  - cx no growth  - continue with abx coverage   - on dc plan for po bactrim vs doxy to complete abx course   - should f/u derm as outpt  - monitor temps  - tolerating abx well so far; no side effects noted  - reason for abx use and side effects reviewed with patient  - fu cbc

## 2019-03-04 NOTE — PROGRESS NOTE ADULT - SUBJECTIVE AND OBJECTIVE BOX
The patient is a 77 yo male with Hx. of  cellulitis shins a year ago, seb. dermatitis, who was seen by  on 2/4/19 and stared on Fluticasone 0.005% ointment for seb. dermatitis develped worsening of rash on face and L side neck. He wentback to dermatology and was started on po Doxycycline on 2/28/19 and was told if his symptoms get worse to come to the ER. Today he came to the ER because of worsening rash on L side face ,swollen L earlobe, neck rash    3/3: marked improvement since yesterday  3/4: continues to improve    Vital Signs Last 24 Hrs  T(C): 37.1 (03 Mar 2019 13:08), Max: 37.2 (03 Mar 2019 05:08)  T(F): 98.7 (03 Mar 2019 13:08), Max: 98.9 (03 Mar 2019 05:08)  HR: 77 (03 Mar 2019 13:08) (77 - 97)  BP: 114/65 (03 Mar 2019 13:08) (114/65 - 148/91)  BP(mean): --  RR: 16 (03 Mar 2019 13:08) (16 - 17)  SpO2: 96% (03 Mar 2019 13:08) (93% - 96%)      HEENT: NC/AT, EOMI, PERRLA,   Neck: supple; thyroid not palpable  Back: no tenderness  Respiratory: respiratory effort normal; clear to auscultation  Cardiovascular: S1S2 regular, no murmurs  Abdomen: soft, not tender, not distended, positive BS; liver and spleen WNL  Musculoskeletal: no muscle tenderness, no joint swelling or tenderness  Extremities: no pedal edema  Neurological/ Psychiatric: AxOx3, Judgement and insight normal;  moving all extremities  Skin: L facial patchy/cracking skin with surrounding erythema extending to L ear/neck with edema/warmth      * Cellulitis- scalp and neck  - responding to IV abx  - c/w present regimes  - dc BGM

## 2019-03-04 NOTE — PROGRESS NOTE ADULT - SUBJECTIVE AND OBJECTIVE BOX
Date of service: 19 @ 13:08    pt seen and examined  feels better  less pain/tenderness/redness along L face/ear    ROS: no fever or chills; denies dizziness, no HA, no SOB or cough, no abdominal pain, no diarrhea or constipation; no dysuria, no urinary frequency, no legs pain, no rashes    MEDICATIONS  (STANDING):  ampicillin/sulbactam  IVPB      ampicillin/sulbactam  IVPB 3 Gram(s) IV Intermittent every 6 hours  heparin  Injectable 5000 Unit(s) SubCutaneous every 12 hours  vancomycin  IVPB 1000 milliGRAM(s) IV Intermittent every 12 hours  vancomycin  IVPB        Vital Signs Last 24 Hrs  T(C): 37 (04 Mar 2019 04:48), Max: 37.6 (03 Mar 2019 20:41)  T(F): 98.6 (04 Mar 2019 04:48), Max: 99.7 (03 Mar 2019 20:41)  HR: 96 (04 Mar 2019 04:48) (79 - 96)  BP: 129/81 (04 Mar 2019 04:48) (112/55 - 129/81)  BP(mean): --  RR: 17 (04 Mar 2019 04:48) (17 - 18)  SpO2: 93% (04 Mar 2019 04:48) (93% - 95%)        PE:  Constitutional: frail looking  HEENT: NC/AT, EOMI, PERRLA, conjunctivae clear; ears and nose atraumatic; pharynx benign  Neck: supple; thyroid not palpable  Back: no tenderness  Respiratory: respiratory effort normal; clear to auscultation  Cardiovascular: S1S2 regular, no murmurs  Abdomen: soft, not tender, not distended, positive BS; liver and spleen WNL  Genitourinary: no suprapubic tenderness  Lymphatic: no LN palpable  Musculoskeletal: no muscle tenderness, no joint swelling or tenderness  Extremities: no pedal edema  Neurological/ Psychiatric: AxOx3, Judgement and insight normal;  moving all extremities  Skin: L facial patchy/cracking skin with surrounding erythema extending to L ear/neck with edema/warmth    Labs: all available labs reviewed                                   14.5   4.85  )-----------( 156      ( 03 Mar 2019 06:14 )             44.9     -    139  |  103  |  15  ----------------------------<  116<H>  4.2   |  29  |  1.10    Ca    8.2<L>      03 Mar 2019 06:14    TPro  7.6  /  Alb  3.7  /  TBili  1.5<H>  /  DBili  x   /  AST  22  /  ALT  36  /  AlkPhos  60             Urinalysis Basic - ( 02 Mar 2019 17:34 )    Color: Yellow / Appearance: Clear / S.020 / pH: x  Gluc: x / Ketone: Trace  / Bili: Negative / Urobili: Negative mg/dL   Blood: x / Protein: 30 mg/dL / Nitrite: Negative   Leuk Esterase: Negative / RBC: 0-2 /HPF / WBC 0-2   Sq Epi: x / Non Sq Epi: Occasional / Bacteria: Occasional    Culture - Urine (19 @ 17:34)    Specimen Source: .Urine None    Culture Results:   No growth    Culture - Blood (19 @ 17:25)    Specimen Source: .Blood None    Culture Results:   No growth to date.          Radiology: all available radiological tests reviewed    Advanced directives addressed: full resuscitation

## 2019-03-05 RX ADMIN — AMPICILLIN SODIUM AND SULBACTAM SODIUM 200 GRAM(S): 250; 125 INJECTION, POWDER, FOR SUSPENSION INTRAMUSCULAR; INTRAVENOUS at 19:52

## 2019-03-05 RX ADMIN — Medication 250 MILLIGRAM(S): at 17:45

## 2019-03-05 RX ADMIN — AMPICILLIN SODIUM AND SULBACTAM SODIUM 200 GRAM(S): 250; 125 INJECTION, POWDER, FOR SUSPENSION INTRAMUSCULAR; INTRAVENOUS at 09:19

## 2019-03-05 RX ADMIN — AMPICILLIN SODIUM AND SULBACTAM SODIUM 200 GRAM(S): 250; 125 INJECTION, POWDER, FOR SUSPENSION INTRAMUSCULAR; INTRAVENOUS at 01:05

## 2019-03-05 RX ADMIN — Medication 250 MILLIGRAM(S): at 04:52

## 2019-03-05 RX ADMIN — AMPICILLIN SODIUM AND SULBACTAM SODIUM 200 GRAM(S): 250; 125 INJECTION, POWDER, FOR SUSPENSION INTRAMUSCULAR; INTRAVENOUS at 13:41

## 2019-03-05 NOTE — PROGRESS NOTE ADULT - SUBJECTIVE AND OBJECTIVE BOX
The patient is a 79 yo male with Hx. of  cellulitis shins a year ago, seb. dermatitis, who was seen by  on 2/4/19 and stared on Fluticasone 0.005% ointment for seb. dermatitis develped worsening of rash on face and L side neck. He wentback to dermatology and was started on po Doxycycline on 2/28/19 and was told if his symptoms get worse to come to the ER. Today he came to the ER because of worsening rash on L side face ,swollen L earlobe, neck rash    3/3: marked improvement since yesterday  3/4: continues to improve  3/5: continues to improve; from hot yesterday the scalp has now normal temp; denies pain ( present on admission)    Vital Signs Last 24 Hrs  T(C): 36.7 (05 Mar 2019 04:29), Max: 37.4 (04 Mar 2019 13:44)  T(F): 98 (05 Mar 2019 04:29), Max: 99.3 (04 Mar 2019 13:44)  HR: 77 (05 Mar 2019 04:29) (67 - 77)  BP: 141/80 (05 Mar 2019 04:29) (122/65 - 141/80)  BP(mean): --  RR: 17 (05 Mar 2019 04:29) (17 - 18)  SpO2: 92% (05 Mar 2019 04:29) (91% - 93%)      HEENT: NC/AT, EOMI, PERRLA,   Neck: supple; thyroid not palpable  Back: no tenderness  Respiratory: respiratory effort normal; clear to auscultation  Cardiovascular: S1S2 regular, no murmurs  Abdomen: soft, not tender, not distended, positive BS; liver and spleen WNL  Musculoskeletal: no muscle tenderness, no joint swelling or tenderness  Extremities: no pedal edema  Neurological/ Psychiatric: AxOx3, Judgement and insight normal;  moving all extremities  Skin: L facial patchy/cracking skin with resolving surrounding erythema extending to L ear/neck with dry yellow scales      * Cellulitis- scalp and neck  - responding to IV abx  - c/w present regimes  - dc home in am most likely  - no evidence of DM  - a1c noted will fup with pcp/Nereyda

## 2019-03-06 ENCOUNTER — TRANSCRIPTION ENCOUNTER (OUTPATIENT)
Age: 79
End: 2019-03-06

## 2019-03-06 VITALS
DIASTOLIC BLOOD PRESSURE: 81 MMHG | SYSTOLIC BLOOD PRESSURE: 155 MMHG | TEMPERATURE: 99 F | RESPIRATION RATE: 18 BRPM | HEART RATE: 83 BPM | OXYGEN SATURATION: 95 %

## 2019-03-06 LAB — BACTERIA WND CULT: ABNORMAL

## 2019-03-06 RX ADMIN — AMPICILLIN SODIUM AND SULBACTAM SODIUM 200 GRAM(S): 250; 125 INJECTION, POWDER, FOR SUSPENSION INTRAMUSCULAR; INTRAVENOUS at 02:07

## 2019-03-06 NOTE — DISCHARGE NOTE ADULT - PATIENT PORTAL LINK FT
You can access the [facility] Portal, offered by [facility], by registering with the following website: [email or web address]

## 2019-03-06 NOTE — PROGRESS NOTE ADULT - SUBJECTIVE AND OBJECTIVE BOX
Date of service: 19 @ 12:22    pt seen and examined  feels better  less oozing/redness from skin lesions along L face/neck  afebrile    ROS: no fever or chills; denies dizziness, no HA, no SOB or cough, no abdominal pain, no diarrhea or constipation; no dysuria, no urinary frequency, no legs pain, no rashes    MEDICATIONS  (STANDING):  doxycycline hyclate Capsule 100 milliGRAM(s) Oral every 12 hours  heparin  Injectable 5000 Unit(s) SubCutaneous every 12 hours      Vital Signs Last 24 Hrs  T(C): 37.2 (06 Mar 2019 11:57), Max: 37.2 (06 Mar 2019 05:12)  T(F): 99 (06 Mar 2019 11:57), Max: 99 (06 Mar 2019 05:12)  HR: 83 (06 Mar 2019 11:57) (64 - 90)  BP: 155/81 (06 Mar 2019 11:57) (123/65 - 155/81)  BP(mean): --  RR: 18 (06 Mar 2019 11:57) (16 - 18)  SpO2: 95% (06 Mar 2019 11:57) (95% - 96%)            PE:  Constitutional: frail looking  HEENT: NC/AT, EOMI, PERRLA, conjunctivae clear; ears and nose atraumatic; pharynx benign  Neck: supple; thyroid not palpable  Back: no tenderness  Respiratory: respiratory effort normal; clear to auscultation  Cardiovascular: S1S2 regular, no murmurs  Abdomen: soft, not tender, not distended, positive BS; liver and spleen WNL  Genitourinary: no suprapubic tenderness  Lymphatic: no LN palpable  Musculoskeletal: no muscle tenderness, no joint swelling or tenderness  Extremities: no pedal edema  Neurological/ Psychiatric: AxOx3, Judgement and insight normal;  moving all extremities  Skin: L facial patchy/cracking skin with resolving erythema extending to L ear/neck with resolving warmth/sloughing    Labs: all available labs reviewed                             Vancomycin Level, Trough: 11.9 ug/mL ( @ 16:39)      Cultures:        Urinalysis Basic - ( 02 Mar 2019 17:34 )    Color: Yellow / Appearance: Clear / S.020 / pH: x  Gluc: x / Ketone: Trace  / Bili: Negative / Urobili: Negative mg/dL   Blood: x / Protein: 30 mg/dL / Nitrite: Negative   Leuk Esterase: Negative / RBC: 0-2 /HPF / WBC 0-2   Sq Epi: x / Non Sq Epi: Occasional / Bacteria: Occasional    Culture - Urine (19 @ 17:34)    Specimen Source: .Urine None    Culture Results:   No growth    Culture - Blood (19 @ 17:25)    Specimen Source: .Blood None    Culture Results:   No growth to date.          Radiology: all available radiological tests reviewed    Advanced directives addressed: full resuscitation

## 2019-03-06 NOTE — PROGRESS NOTE ADULT - ASSESSMENT
The patient is a 77 yo male with Hx. of  cellulitis shins a year ago, seb. dermatitis, who was seen by  on 2/4/19 and stared on Fluticasone 0.005% ointment for seb. dermatitis develped worsening of rash on face and L side neck. He wentback to dermatology and was started on po Doxycycline on 2/28/19 and was told if his symptoms get worse to come to the ER. 3/2 he came to the ER because of worsening rash on L side face ,swollen L earlobe, neck rash here was given IV vancomycin w/ some noted improvement. No fevers/chills.     1 L facial cellulitis. seborrheic dermatitis  - slowly improving   - s/p 3 days vanco/unasyn  - cx no growth  - continue with abx coverage   - on dc plan for po doxy to complete 7-10 day abx course   - should f/u derm as outpt  - monitor temps  - tolerating abx well so far; no side effects noted  - reason for abx use and side effects reviewed with patient  - fu cbc

## 2019-03-06 NOTE — DISCHARGE NOTE ADULT - CARE PLAN
Principal Discharge DX:	Cellulitis of face  Goal:	f/up with Derm and PCP  Assessment and plan of treatment:	Doxy as per ID; ask PCP to monitor your glucose; a1c was 6.8- take copy of the blood test a1c to PCP

## 2019-03-06 NOTE — DISCHARGE NOTE ADULT - PLAN OF CARE
f/up with Derm and PCP Doxy as per ID; ask PCP to monitor your glucose; a1c was 6.8- take copy of the blood test a1c to PCP

## 2019-03-06 NOTE — DISCHARGE NOTE ADULT - CARE PROVIDER_API CALL
Sarath Dawn)  Medicine  [address] [place] [address]  Phone: [phone]  Fax: [phone]  Follow Up Time:     Niles Tsai)  Dermatology  [address] [place] [address]  Phone: [phone]  Fax: [phone]  Follow Up Time:

## 2019-03-06 NOTE — DISCHARGE NOTE ADULT - MEDICATION SUMMARY - MEDICATIONS TO TAKE
I will START or STAY ON the medications listed below when I get home from the hospital:    doxycycline monohydrate 100 mg oral capsule  -- 1 cap(s) by mouth every 12 hours  -- Indication: For antibiotic

## 2019-03-06 NOTE — DISCHARGE NOTE ADULT - HOSPITAL COURSE
The patient is a 77 yo male with Hx. of  cellulitis shins a year ago, seb. dermatitis, who was seen by  on 2/4/19 and stared on Fluticasone 0.005% ointment for seb. dermatitis develped worsening of rash on face and L side neck. He wentback to dermatology and was started on po Doxycycline on 2/28/19 and was told if his symptoms get worse to come to the ER. Today he came to the ER because of worsening rash on L side face ,swollen L earlobe, neck rash    3/3: marked improvement since yesterday  3/4: continues to improve  3/5: continues to improve; from hot yesterday the scalp has now normal temp; denies pain ( present on admission)    Vital Signs Last 24 Hrs  T(C): 36.7 (05 Mar 2019 04:29), Max: 37.4 (04 Mar 2019 13:44)  T(F): 98 (05 Mar 2019 04:29), Max: 99.3 (04 Mar 2019 13:44)  HR: 77 (05 Mar 2019 04:29) (67 - 77)  BP: 141/80 (05 Mar 2019 04:29) (122/65 - 141/80)  BP(mean): --  RR: 17 (05 Mar 2019 04:29) (17 - 18)  SpO2: 92% (05 Mar 2019 04:29) (91% - 93%)      HEENT: NC/AT, EOMI, PERRLA,   Neck: supple; thyroid not palpable  Back: no tenderness  Respiratory: respiratory effort normal; clear to auscultation  Cardiovascular: S1S2 regular, no murmurs  Abdomen: soft, not tender, not distended, positive BS; liver and spleen WNL  Musculoskeletal: no muscle tenderness, no joint swelling or tenderness  Extremities: no pedal edema  Neurological/ Psychiatric: AxOx3, Judgement and insight normal;  moving all extremities  Skin: L facial patchy/cracking skin with resolving surrounding erythema extending to L ear/neck with dry yellow scales      * Cellulitis- scalp and neck  - responding to IV abx  - c/w present regimes  - dc home in am most likely  - no evidence of DM  - a1c noted will fup with pcp/Nereyda

## 2019-03-08 DIAGNOSIS — L03.211 CELLULITIS OF FACE: ICD-10-CM

## 2019-03-08 DIAGNOSIS — L03.221 CELLULITIS OF NECK: ICD-10-CM

## 2019-03-08 DIAGNOSIS — L03.811 CELLULITIS OF HEAD [ANY PART, EXCEPT FACE]: ICD-10-CM

## 2019-03-08 DIAGNOSIS — L21.9 SEBORRHEIC DERMATITIS, UNSPECIFIED: ICD-10-CM

## 2019-03-08 LAB
CULTURE RESULTS: SIGNIFICANT CHANGE UP
CULTURE RESULTS: SIGNIFICANT CHANGE UP
SPECIMEN SOURCE: SIGNIFICANT CHANGE UP
SPECIMEN SOURCE: SIGNIFICANT CHANGE UP

## 2019-03-09 PROBLEM — L30.9 DERMATITIS, UNSPECIFIED: Chronic | Status: INACTIVE | Noted: 2019-03-02 | Resolved: 2019-03-08

## 2019-03-11 ENCOUNTER — APPOINTMENT (OUTPATIENT)
Dept: DERMATOLOGY | Facility: CLINIC | Age: 79
End: 2019-03-11
Payer: MEDICARE

## 2019-03-11 PROCEDURE — 99213 OFFICE O/P EST LOW 20 MIN: CPT

## 2019-03-11 NOTE — PHYSICAL EXAM
[Alert] : alert [Oriented x 3] : ~L oriented x 3 [Well Nourished] : well nourished [Declined] : declined [Eyelids] : Eyelids [Ears] : Ears [Lips] : Lips [FreeTextEntry3] : Eczematous patches, diffusely on the back, shoulders, and chest.\par Erythema and scale - forehead.\par marked crusting of the left cheek, left ear, and left neck, stopping abruptly at the midline.

## 2019-03-11 NOTE — ASSESSMENT
[FreeTextEntry1] : Eczema\par Education.\par Elidel cream bid.  hold topical steroid for now, due to BS's.\par Emollients.\par f/u in 3 weeks.\par \par Zoster - doubt he had a bacterial process.\par Will resolve at this point.\par Education.

## 2019-03-11 NOTE — HISTORY OF PRESENT ILLNESS
[FreeTextEntry1] : Eczema, and "infection" of the left face. [de-identified] : Patient tx'ed in hospital of left facial infection, tx'ed with IV antibiotics for a week, and discharged on doxycycline.  Still with mild irritation to the region, although denies pain.\par He notes his back was much improved, although the topical steroids were d/c'ed in hospital due to increase in blood sugars.

## 2019-03-12 PROBLEM — L30.9 DERMATITIS, UNSPECIFIED: Chronic | Status: ACTIVE | Noted: 2019-03-08

## 2019-03-14 ENCOUNTER — INPATIENT (INPATIENT)
Facility: HOSPITAL | Age: 79
LOS: 3 days | Discharge: LEFT BEFORE DISCHARGE | End: 2019-03-18
Attending: HOSPITALIST | Admitting: HOSPITALIST
Payer: MEDICARE

## 2019-03-14 VITALS
OXYGEN SATURATION: 90 % | TEMPERATURE: 103 F | RESPIRATION RATE: 24 BRPM | DIASTOLIC BLOOD PRESSURE: 73 MMHG | SYSTOLIC BLOOD PRESSURE: 139 MMHG | HEART RATE: 128 BPM

## 2019-03-14 LAB
ALBUMIN SERPL ELPH-MCNC: 3.4 G/DL — SIGNIFICANT CHANGE UP (ref 3.3–5)
ALP SERPL-CCNC: 60 U/L — SIGNIFICANT CHANGE UP (ref 40–120)
ALT FLD-CCNC: 28 U/L — SIGNIFICANT CHANGE UP (ref 12–78)
ANION GAP SERPL CALC-SCNC: 9 MMOL/L — SIGNIFICANT CHANGE UP (ref 5–17)
APPEARANCE UR: CLEAR — SIGNIFICANT CHANGE UP
APTT BLD: 28.3 SEC — SIGNIFICANT CHANGE UP (ref 27.5–36.3)
AST SERPL-CCNC: 15 U/L — SIGNIFICANT CHANGE UP (ref 15–37)
BACTERIA # UR AUTO: ABNORMAL
BASOPHILS # BLD AUTO: 0.03 K/UL — SIGNIFICANT CHANGE UP (ref 0–0.2)
BASOPHILS NFR BLD AUTO: 0.2 % — SIGNIFICANT CHANGE UP (ref 0–2)
BILIRUB SERPL-MCNC: 1 MG/DL — SIGNIFICANT CHANGE UP (ref 0.2–1.2)
BILIRUB UR-MCNC: NEGATIVE — SIGNIFICANT CHANGE UP
BUN SERPL-MCNC: 15 MG/DL — SIGNIFICANT CHANGE UP (ref 7–23)
CALCIUM SERPL-MCNC: 8.9 MG/DL — SIGNIFICANT CHANGE UP (ref 8.5–10.1)
CHLORIDE SERPL-SCNC: 105 MMOL/L — SIGNIFICANT CHANGE UP (ref 96–108)
CO2 SERPL-SCNC: 28 MMOL/L — SIGNIFICANT CHANGE UP (ref 22–31)
COLOR SPEC: YELLOW — SIGNIFICANT CHANGE UP
CREAT SERPL-MCNC: 1.27 MG/DL — SIGNIFICANT CHANGE UP (ref 0.5–1.3)
DIFF PNL FLD: NEGATIVE — SIGNIFICANT CHANGE UP
EOSINOPHIL # BLD AUTO: 0.13 K/UL — SIGNIFICANT CHANGE UP (ref 0–0.5)
EOSINOPHIL NFR BLD AUTO: 1.1 % — SIGNIFICANT CHANGE UP (ref 0–6)
EPI CELLS # UR: SIGNIFICANT CHANGE UP
GLUCOSE SERPL-MCNC: 122 MG/DL — HIGH (ref 70–99)
GLUCOSE UR QL: NEGATIVE MG/DL — SIGNIFICANT CHANGE UP
GRAN CASTS # UR COMP ASSIST: ABNORMAL /LPF
HCT VFR BLD CALC: 48.3 % — SIGNIFICANT CHANGE UP (ref 39–50)
HGB BLD-MCNC: 15.4 G/DL — SIGNIFICANT CHANGE UP (ref 13–17)
IMM GRANULOCYTES NFR BLD AUTO: 0.4 % — SIGNIFICANT CHANGE UP (ref 0–1.5)
INR BLD: 1.08 RATIO — SIGNIFICANT CHANGE UP (ref 0.88–1.16)
KETONES UR-MCNC: ABNORMAL
LACTATE SERPL-SCNC: 1.8 MMOL/L — SIGNIFICANT CHANGE UP (ref 0.7–2)
LACTATE SERPL-SCNC: 2.1 MMOL/L — HIGH (ref 0.7–2)
LEUKOCYTE ESTERASE UR-ACNC: ABNORMAL
LYMPHOCYTES # BLD AUTO: 0.75 K/UL — LOW (ref 1–3.3)
LYMPHOCYTES # BLD AUTO: 6.1 % — LOW (ref 13–44)
MCHC RBC-ENTMCNC: 30.8 PG — SIGNIFICANT CHANGE UP (ref 27–34)
MCHC RBC-ENTMCNC: 31.9 GM/DL — LOW (ref 32–36)
MCV RBC AUTO: 96.6 FL — SIGNIFICANT CHANGE UP (ref 80–100)
MONOCYTES # BLD AUTO: 1.03 K/UL — HIGH (ref 0–0.9)
MONOCYTES NFR BLD AUTO: 8.4 % — SIGNIFICANT CHANGE UP (ref 2–14)
NEUTROPHILS # BLD AUTO: 10.22 K/UL — HIGH (ref 1.8–7.4)
NEUTROPHILS NFR BLD AUTO: 83.8 % — HIGH (ref 43–77)
NITRITE UR-MCNC: NEGATIVE — SIGNIFICANT CHANGE UP
NRBC # BLD: 0 /100 WBCS — SIGNIFICANT CHANGE UP (ref 0–0)
PH UR: 6 — SIGNIFICANT CHANGE UP (ref 5–8)
PLATELET # BLD AUTO: 293 K/UL — SIGNIFICANT CHANGE UP (ref 150–400)
POTASSIUM SERPL-MCNC: 4.6 MMOL/L — SIGNIFICANT CHANGE UP (ref 3.5–5.3)
POTASSIUM SERPL-SCNC: 4.6 MMOL/L — SIGNIFICANT CHANGE UP (ref 3.5–5.3)
PROT SERPL-MCNC: 7.8 GM/DL — SIGNIFICANT CHANGE UP (ref 6–8.3)
PROT UR-MCNC: 30 MG/DL
PROTHROM AB SERPL-ACNC: 12 SEC — SIGNIFICANT CHANGE UP (ref 10–12.9)
RAPID RVP RESULT: SIGNIFICANT CHANGE UP
RBC # BLD: 5 M/UL — SIGNIFICANT CHANGE UP (ref 4.2–5.8)
RBC # FLD: 13.7 % — SIGNIFICANT CHANGE UP (ref 10.3–14.5)
RBC CASTS # UR COMP ASSIST: SIGNIFICANT CHANGE UP /HPF (ref 0–4)
SODIUM SERPL-SCNC: 142 MMOL/L — SIGNIFICANT CHANGE UP (ref 135–145)
SP GR SPEC: 1.02 — SIGNIFICANT CHANGE UP (ref 1.01–1.02)
UROBILINOGEN FLD QL: NEGATIVE MG/DL — SIGNIFICANT CHANGE UP
WBC # BLD: 12.21 K/UL — HIGH (ref 3.8–10.5)
WBC # FLD AUTO: 12.21 K/UL — HIGH (ref 3.8–10.5)
WBC UR QL: SIGNIFICANT CHANGE UP

## 2019-03-14 PROCEDURE — 71045 X-RAY EXAM CHEST 1 VIEW: CPT | Mod: 26

## 2019-03-14 PROCEDURE — 93010 ELECTROCARDIOGRAM REPORT: CPT

## 2019-03-14 PROCEDURE — 99285 EMERGENCY DEPT VISIT HI MDM: CPT

## 2019-03-14 RX ORDER — ACETAMINOPHEN 500 MG
650 TABLET ORAL EVERY 6 HOURS
Qty: 0 | Refills: 0 | Status: DISCONTINUED | OUTPATIENT
Start: 2019-03-14 | End: 2019-03-18

## 2019-03-14 RX ORDER — ACETAMINOPHEN 500 MG
650 TABLET ORAL ONCE
Qty: 0 | Refills: 0 | Status: DISCONTINUED | OUTPATIENT
Start: 2019-03-14 | End: 2019-03-14

## 2019-03-14 RX ORDER — PETROLATUM,WHITE
1 JELLY (GRAM) TOPICAL THREE TIMES A DAY
Qty: 0 | Refills: 0 | Status: DISCONTINUED | OUTPATIENT
Start: 2019-03-14 | End: 2019-03-18

## 2019-03-14 RX ORDER — HEPARIN SODIUM 5000 [USP'U]/ML
5000 INJECTION INTRAVENOUS; SUBCUTANEOUS EVERY 8 HOURS
Qty: 0 | Refills: 0 | Status: DISCONTINUED | OUTPATIENT
Start: 2019-03-14 | End: 2019-03-18

## 2019-03-14 RX ORDER — SODIUM CHLORIDE 9 MG/ML
1000 INJECTION, SOLUTION INTRAVENOUS
Qty: 0 | Refills: 0 | Status: DISCONTINUED | OUTPATIENT
Start: 2019-03-14 | End: 2019-03-15

## 2019-03-14 RX ORDER — ACETAMINOPHEN 500 MG
975 TABLET ORAL ONCE
Qty: 0 | Refills: 0 | Status: COMPLETED | OUTPATIENT
Start: 2019-03-14 | End: 2019-03-14

## 2019-03-14 RX ORDER — SODIUM CHLORIDE 9 MG/ML
1950 INJECTION, SOLUTION INTRAVENOUS ONCE
Qty: 0 | Refills: 0 | Status: COMPLETED | OUTPATIENT
Start: 2019-03-14 | End: 2019-03-14

## 2019-03-14 RX ORDER — CEFEPIME 1 G/1
1000 INJECTION, POWDER, FOR SOLUTION INTRAMUSCULAR; INTRAVENOUS EVERY 12 HOURS
Qty: 0 | Refills: 0 | Status: DISCONTINUED | OUTPATIENT
Start: 2019-03-14 | End: 2019-03-15

## 2019-03-14 RX ORDER — CEFEPIME 1 G/1
2000 INJECTION, POWDER, FOR SOLUTION INTRAMUSCULAR; INTRAVENOUS ONCE
Qty: 0 | Refills: 0 | Status: COMPLETED | OUTPATIENT
Start: 2019-03-14 | End: 2019-03-14

## 2019-03-14 RX ORDER — VANCOMYCIN HCL 1 G
1000 VIAL (EA) INTRAVENOUS ONCE
Qty: 0 | Refills: 0 | Status: COMPLETED | OUTPATIENT
Start: 2019-03-14 | End: 2019-03-14

## 2019-03-14 RX ADMIN — SODIUM CHLORIDE 1950 MILLILITER(S): 9 INJECTION, SOLUTION INTRAVENOUS at 15:37

## 2019-03-14 RX ADMIN — SODIUM CHLORIDE 1950 MILLILITER(S): 9 INJECTION, SOLUTION INTRAVENOUS at 14:37

## 2019-03-14 RX ADMIN — HEPARIN SODIUM 5000 UNIT(S): 5000 INJECTION INTRAVENOUS; SUBCUTANEOUS at 22:50

## 2019-03-14 RX ADMIN — Medication 975 MILLIGRAM(S): at 16:06

## 2019-03-14 RX ADMIN — Medication 1 APPLICATION(S): at 23:26

## 2019-03-14 RX ADMIN — Medication 250 MILLIGRAM(S): at 16:06

## 2019-03-14 RX ADMIN — SODIUM CHLORIDE 100 MILLILITER(S): 9 INJECTION, SOLUTION INTRAVENOUS at 22:50

## 2019-03-14 RX ADMIN — CEFEPIME 100 MILLIGRAM(S): 1 INJECTION, POWDER, FOR SOLUTION INTRAMUSCULAR; INTRAVENOUS at 16:07

## 2019-03-14 NOTE — ED STATDOCS - PROGRESS NOTE DETAILS
Sophie MCCORMACK for ED Attending Dr. Bauer: 79 y/o male with PMHx of presents to the ED c/o malaise and associated difficulty swallowing. Recently admitted to the hospital for abx for L facial cellulitis. Dc'd with no relief. Will send pt to the Main ED for further evaluation and r/o sepsis.

## 2019-03-14 NOTE — H&P ADULT - NSHPPHYSICALEXAM_GEN_ALL_CORE
Vital Signs Last 24 Hrs  T(C): 36.9 (14 Mar 2019 22:40), Max: 39.6 (14 Mar 2019 14:26)  T(F): 98.4 (14 Mar 2019 22:40), Max: 103.2 (14 Mar 2019 14:26)  HR: 74 (14 Mar 2019 22:40) (73 - 128)  BP: 126/71 (14 Mar 2019 22:40) (101/44 - 139/73)  RR: 18 (14 Mar 2019 22:40) (17 - 24)  SpO2: 99% (14 Mar 2019 22:40) (90% - 99%)

## 2019-03-14 NOTE — ED PROVIDER NOTE - SKIN, MLM
Skin normal color for race, warm, dry and intact. diffuse eczema, scaly red rash torso, bilateral upper extrem, neck, scalp. legs.  left side of face and neck with multiple scabbed over areas, no blisters or pustules, no erythema.

## 2019-03-14 NOTE — ED PROVIDER NOTE - CONSTITUTIONAL, MLM
normal... Well appearing, well nourished, awake, alert, oriented to person, place, time/situation and in no apparent distress. Poor historian.

## 2019-03-14 NOTE — H&P ADULT - HISTORY OF PRESENT ILLNESS
77 yo from 's office BIB wife for malaise, fever, difficulty swallowing which causes him to cough. PMHx is only eczema. Admitted last week to , d/c'ed 4 days ago, for cellulitis of face secondary to eczema. was on vanc and unasyn. improved. d/c'ed on po bactrim. finished the bactrim. saw /edward (derm) since discharge and told pt he had "shingles" of his face where the cellulitis was and that it is now resolving and scabs would eventually flake off. However, there is no mention of shingles in the hospital record of the admit, including in the ID consult by . there was also no antiviral given to pt. Pt did not know he had a fever today. denies cp, sob, abd pain, sore throat, n/v/d. 79 yo male from 's office BIB wife for malaise, fever, difficulty swallowing which causes him to cough. PMHx is only eczema. Admitted last week to , d/c'ed 4 days ago, for cellulitis of face secondary to eczema. was on vanc and unasyn. improved. d/c'ed on po bactrim. finished the bactrim. He saw Dr Tsai (dermatologist) since discharge and Dr Tsai told pt that he had "shingles" of his face where the cellulitis was. And that it is now resolving and scabs would eventually flake off. However, there is no mention of shingles in the hospital record of the admit, including in the ID consult by .No antiviral was given to patient. Pt did not know he had a fever today. He is found to be febrile in ED also. He has difficulty swallowing food for the last few days. He is only able to eat softer food, like ice cream. He denies cp, sob, abd pain, sore throat, n/v/d.    Family hx:  Patient is unable to provide any detail family history this time.

## 2019-03-14 NOTE — ED PROVIDER NOTE - OBJECTIVE STATEMENT
79 yo from 's office BIB wife for malaise, fever, difficulty swallowing which causes him to cough. PMHx is only eczema. Admitted last week to , d/c'ed 4 days ago, for cellulitis of face secondary to eczema. was on vanc and unasyn. improved. d/c'ed on po bactrim. finished the bactrim. saw /edward (derm) since discharge and told pt he had "shingles" of his face where the cellulitis was and that it is now resolving and scabs would eventually flake off. However, there is no mention of shingles in the hospital record of the admit, including in the ID consult by . there was also no antiviral given to pt. Pt did not know he had a fever today. denies cp, sob, abd pain, sore throat, n/v/d.

## 2019-03-14 NOTE — ED ADULT TRIAGE NOTE - CHIEF COMPLAINT QUOTE
Patient presents stating "he can't drink" sent in by MD for evaluation. Patient states unknown name of MD who sent patient in. Patient states he just doesn't feel right but is not sure why he was sent in

## 2019-03-14 NOTE — ED ADULT NURSE NOTE - OBJECTIVE STATEMENT
pt is a 77 y/o male w/ pmhx of ezcema, cellulitis last week (treated w/ PO bactrim), presenting to ED for eval of fever, malaise, intermittent confusion and diff swallowing. denies trauma or fall. denies weakness or neglect. no facial asymmetry or slurred speech. no focal neuro deficits noted.

## 2019-03-14 NOTE — PATIENT PROFILE ADULT - ABILITY TO HEAR (WITH HEARING AID OR HEARING APPLIANCE IF NORMALLY USED):
left ear hard of hearing/Mildly to Moderately Impaired: difficulty hearing in some environments or speaker may need to increase volume or speak distinctly

## 2019-03-14 NOTE — PATIENT PROFILE ADULT - NSCANCSCRNPSYHH_GEN_A_NUR
Health Maintenance Summary     Topic Due On Due Status Completed On Postpone Until Reason    Diabetes Eye Exam Sep 8, 1997 Overdue       Glycohemoglobin A1C  (Diabetes Sugar)  Aug 19, 2018 Due On May 19, 2018      Microalbumin  (Diabetes Urine Test)  Sep 8, 1997 Overdue       GFR  (Kidney Function Test)  May 19, 2019 Not Due May 19, 2018      Diabetes Foot Exam  Sep 8, 1997 Overdue       Pneumococcal 19-64 Medium Risk Sep 8, 1998 Postponed  Aug 23, 2018 Patient Refused    IMMUNIZATION - DTaP/Tdap/Td May 17, 2028 Not Due May 17, 2018      Immunization-Influenza Sep 1, 2018 Not Due       Depression Screening May 17, 2019 Not Due May 17, 2018            Patient is due for topics as listed above, he wishes to discuss with provider .             1

## 2019-03-14 NOTE — ED ADULT NURSE NOTE - NSIMPLEMENTINTERV_GEN_ALL_ED
Implemented All Fall Risk Interventions:  [place] to call system. Call bell, personal items and telephone within reach. Instruct patient to call for assistance. Room bathroom lighting operational. Non-slip footwear when patient is off stretcher. Physically safe environment: no spills, clutter or unnecessary equipment. Stretcher in lowest position, wheels locked, appropriate side rails in place. Provide visual cue, wrist band, yellow gown, etc. Monitor gait and stability. Monitor for mental status changes and reorient to person, place, and time. Review medications for side effects contributing to fall risk. Reinforce activity limits and safety measures with patient and family.

## 2019-03-14 NOTE — H&P ADULT - ASSESSMENT
77 yo male presented with fever and malaise.    A/P:    1.  Sepsis of Unknown source  -on IV antibiotics  -follow cultures  -follow clinically  -follow ID consult  -r/o Shingles    2.  Difficulty swallowing  -follow GI consult    3.  Eczema  -continue Aquaphor as before    4.  Heparin for DVT ppx     5.  Code status: Full code.

## 2019-03-14 NOTE — PATIENT PROFILE ADULT - STATED REASON FOR ADMISSION
"I was here previously and when I was discharged I did not feel well still. I was tired, had trouble swallowing, and a fever"

## 2019-03-14 NOTE — H&P ADULT - NEUROLOGICAL DETAILS
alert and oriented x 3/responds to pain/responds to verbal commands/deep reflexes intact/cranial nerves intact/normal strength/sensation intact

## 2019-03-14 NOTE — ED PROVIDER NOTE - CLINICAL SUMMARY MEDICAL DECISION MAKING FREE TEXT BOX
77 yo with c/o malaise, fever , d/c'ed 4 days ago from  for facial cellulitis. work up for sepsis. ?source. skin currently appears intact without cellulitis. will ck ua, cxr, rvp, labs, cxs. give ivfs, abx and tylenol. TBA

## 2019-03-15 LAB
BASOPHILS # BLD AUTO: 0.05 K/UL — SIGNIFICANT CHANGE UP (ref 0–0.2)
BASOPHILS NFR BLD AUTO: 0.6 % — SIGNIFICANT CHANGE UP (ref 0–2)
CULTURE RESULTS: NO GROWTH — SIGNIFICANT CHANGE UP
EOSINOPHIL # BLD AUTO: 0.39 K/UL — SIGNIFICANT CHANGE UP (ref 0–0.5)
EOSINOPHIL NFR BLD AUTO: 4.4 % — SIGNIFICANT CHANGE UP (ref 0–6)
GLUCOSE BLDC GLUCOMTR-MCNC: 104 MG/DL — HIGH (ref 70–99)
GLUCOSE BLDC GLUCOMTR-MCNC: 115 MG/DL — HIGH (ref 70–99)
HCT VFR BLD CALC: 38.6 % — LOW (ref 39–50)
HGB BLD-MCNC: 12.7 G/DL — LOW (ref 13–17)
IMM GRANULOCYTES NFR BLD AUTO: 0.3 % — SIGNIFICANT CHANGE UP (ref 0–1.5)
LYMPHOCYTES # BLD AUTO: 1.39 K/UL — SIGNIFICANT CHANGE UP (ref 1–3.3)
LYMPHOCYTES # BLD AUTO: 15.8 % — SIGNIFICANT CHANGE UP (ref 13–44)
MCHC RBC-ENTMCNC: 31.8 PG — SIGNIFICANT CHANGE UP (ref 27–34)
MCHC RBC-ENTMCNC: 32.9 GM/DL — SIGNIFICANT CHANGE UP (ref 32–36)
MCV RBC AUTO: 96.5 FL — SIGNIFICANT CHANGE UP (ref 80–100)
MONOCYTES # BLD AUTO: 0.77 K/UL — SIGNIFICANT CHANGE UP (ref 0–0.9)
MONOCYTES NFR BLD AUTO: 8.7 % — SIGNIFICANT CHANGE UP (ref 2–14)
NEUTROPHILS # BLD AUTO: 6.19 K/UL — SIGNIFICANT CHANGE UP (ref 1.8–7.4)
NEUTROPHILS NFR BLD AUTO: 70.2 % — SIGNIFICANT CHANGE UP (ref 43–77)
NRBC # BLD: 0 /100 WBCS — SIGNIFICANT CHANGE UP (ref 0–0)
PLATELET # BLD AUTO: 255 K/UL — SIGNIFICANT CHANGE UP (ref 150–400)
RBC # BLD: 4 M/UL — LOW (ref 4.2–5.8)
RBC # FLD: 13.6 % — SIGNIFICANT CHANGE UP (ref 10.3–14.5)
SPECIMEN SOURCE: SIGNIFICANT CHANGE UP
WBC # BLD: 8.82 K/UL — SIGNIFICANT CHANGE UP (ref 3.8–10.5)
WBC # FLD AUTO: 8.82 K/UL — SIGNIFICANT CHANGE UP (ref 3.8–10.5)

## 2019-03-15 PROCEDURE — 74220 X-RAY XM ESOPHAGUS 1CNTRST: CPT | Mod: 26

## 2019-03-15 RX ORDER — NYSTATIN 500MM UNIT
500000 POWDER (EA) MISCELLANEOUS EVERY 6 HOURS
Qty: 0 | Refills: 0 | Status: DISCONTINUED | OUTPATIENT
Start: 2019-03-15 | End: 2019-03-18

## 2019-03-15 RX ORDER — DEXTROSE 50 % IN WATER 50 %
12.5 SYRINGE (ML) INTRAVENOUS ONCE
Qty: 0 | Refills: 0 | Status: DISCONTINUED | OUTPATIENT
Start: 2019-03-15 | End: 2019-03-18

## 2019-03-15 RX ORDER — SODIUM CHLORIDE 9 MG/ML
1000 INJECTION INTRAMUSCULAR; INTRAVENOUS; SUBCUTANEOUS
Qty: 0 | Refills: 0 | Status: DISCONTINUED | OUTPATIENT
Start: 2019-03-15 | End: 2019-03-15

## 2019-03-15 RX ORDER — SODIUM CHLORIDE 9 MG/ML
1000 INJECTION, SOLUTION INTRAVENOUS
Qty: 0 | Refills: 0 | Status: DISCONTINUED | OUTPATIENT
Start: 2019-03-15 | End: 2019-03-18

## 2019-03-15 RX ORDER — DEXTROSE 50 % IN WATER 50 %
15 SYRINGE (ML) INTRAVENOUS ONCE
Qty: 0 | Refills: 0 | Status: DISCONTINUED | OUTPATIENT
Start: 2019-03-15 | End: 2019-03-18

## 2019-03-15 RX ORDER — DEXTROSE 50 % IN WATER 50 %
25 SYRINGE (ML) INTRAVENOUS ONCE
Qty: 0 | Refills: 0 | Status: DISCONTINUED | OUTPATIENT
Start: 2019-03-15 | End: 2019-03-18

## 2019-03-15 RX ORDER — GLUCAGON INJECTION, SOLUTION 0.5 MG/.1ML
1 INJECTION, SOLUTION SUBCUTANEOUS ONCE
Qty: 0 | Refills: 0 | Status: DISCONTINUED | OUTPATIENT
Start: 2019-03-15 | End: 2019-03-18

## 2019-03-15 RX ORDER — FLUCONAZOLE 150 MG/1
200 TABLET ORAL ONCE
Qty: 0 | Refills: 0 | Status: COMPLETED | OUTPATIENT
Start: 2019-03-15 | End: 2019-03-15

## 2019-03-15 RX ORDER — INSULIN LISPRO 100/ML
VIAL (ML) SUBCUTANEOUS AT BEDTIME
Qty: 0 | Refills: 0 | Status: DISCONTINUED | OUTPATIENT
Start: 2019-03-15 | End: 2019-03-18

## 2019-03-15 RX ORDER — INSULIN LISPRO 100/ML
VIAL (ML) SUBCUTANEOUS
Qty: 0 | Refills: 0 | Status: DISCONTINUED | OUTPATIENT
Start: 2019-03-15 | End: 2019-03-18

## 2019-03-15 RX ORDER — PIPERACILLIN AND TAZOBACTAM 4; .5 G/20ML; G/20ML
3.38 INJECTION, POWDER, LYOPHILIZED, FOR SOLUTION INTRAVENOUS EVERY 8 HOURS
Qty: 0 | Refills: 0 | Status: DISCONTINUED | OUTPATIENT
Start: 2019-03-15 | End: 2019-03-18

## 2019-03-15 RX ORDER — FLUCONAZOLE 150 MG/1
100 TABLET ORAL DAILY
Qty: 0 | Refills: 0 | Status: DISCONTINUED | OUTPATIENT
Start: 2019-03-16 | End: 2019-03-18

## 2019-03-15 RX ADMIN — SODIUM CHLORIDE 75 MILLILITER(S): 9 INJECTION, SOLUTION INTRAVENOUS at 22:28

## 2019-03-15 RX ADMIN — Medication 500000 UNIT(S): at 15:01

## 2019-03-15 RX ADMIN — Medication 0: at 14:57

## 2019-03-15 RX ADMIN — HEPARIN SODIUM 5000 UNIT(S): 5000 INJECTION INTRAVENOUS; SUBCUTANEOUS at 05:19

## 2019-03-15 RX ADMIN — CEFEPIME 1000 MILLIGRAM(S): 1 INJECTION, POWDER, FOR SOLUTION INTRAMUSCULAR; INTRAVENOUS at 05:19

## 2019-03-15 RX ADMIN — FLUCONAZOLE 200 MILLIGRAM(S): 150 TABLET ORAL at 20:21

## 2019-03-15 RX ADMIN — Medication 500000 UNIT(S): at 20:24

## 2019-03-15 RX ADMIN — SODIUM CHLORIDE 75 MILLILITER(S): 9 INJECTION INTRAMUSCULAR; INTRAVENOUS; SUBCUTANEOUS at 20:42

## 2019-03-15 RX ADMIN — Medication 1 APPLICATION(S): at 18:17

## 2019-03-15 RX ADMIN — Medication 1 APPLICATION(S): at 05:19

## 2019-03-15 RX ADMIN — PIPERACILLIN AND TAZOBACTAM 25 GRAM(S): 4; .5 INJECTION, POWDER, LYOPHILIZED, FOR SOLUTION INTRAVENOUS at 17:26

## 2019-03-15 NOTE — PROGRESS NOTE ADULT - SUBJECTIVE AND OBJECTIVE BOX
HPI  77 yo male presents with fever, difficulty swallowing which causes him to cough. Admitted last week to  for cellulitis of face secondary to eczema. Pt was on vanc and unasyn. and improved. He later completed bactrim course. Pt saw his Dermatologist who told pt he had Shingles. Patient was febrile in the ED. Patient reports progressive dysphagia since discharge.      SUBJECTIVE:     Pt seen and evaluated at bedside today. C/o fevers and difficulty swallowing progressively worsening since discharge. Patient denied other complaints when seen. Pt states he was told to put cream on the skin lesions when he saw dermatology. He denies pain prior to eruption of skin lesions.    REVIEW OF SYSTEMS:    CONSTITUTIONAL: No weakness, +fevers , no chills  EYES/ENT: No visual changes;  No vertigo, + difficulty swallowing  NECK: No pain or stiffness  RESPIRATORY: No cough, wheezing, hemoptysis; No shortness of breath  CARDIOVASCULAR: No chest pain or palpitations  GASTROINTESTINAL: No abdominal or epigastric pain. No nausea, vomiting, or hematemesis; No diarrhea or constipation. No melena or hematochezia.  GENITOURINARY: No dysuria, frequency or hematuria  NEUROLOGICAL: No numbness or weakness  SKIN: + lesions on face, arms and back   All other review of systems is negative unless indicated above    Vital Signs Last 24 Hrs  T(C): 36.9 (15 Mar 2019 05:12), Max: 36.9 (14 Mar 2019 22:40)  T(F): 98.4 (15 Mar 2019 05:12), Max: 98.4 (14 Mar 2019 22:40)  HR: 68 (15 Mar 2019 05:12) (68 - 74)  BP: 124/74 (15 Mar 2019 05:12) (124/74 - 132/82)  BP(mean): --  RR: 16 (15 Mar 2019 05:12) (16 - 18)  SpO2: 96% (15 Mar 2019 05:12) (96% - 99%)    I&O's Summary    14 Mar 2019 07:01  -  15 Mar 2019 07:00  --------------------------------------------------------  IN: 0 mL / OUT: 200 mL / NET: -200 mL        CAPILLARY BLOOD GLUCOSE      POCT Blood Glucose.: 115 mg/dL (15 Mar 2019 14:52)      PHYSICAL EXAM:    Constitutional: NAD, awake and alert, well-developed  HEENT: PERR, EOMI,  Neck: Soft and supple, No LAD  Respiratory: Breath sounds are clear bilaterally, No wheezing, rales or rhonchi  Cardiovascular: S1 and S2, regular rate and rhythm, no Murmurs, gallops or rubs  Gastrointestinal: Bowel Sounds present, soft, nontender, nondistended, no guarding, no rebound  Extremities: No peripheral edema  Vascular: 2+ peripheral pulses  Neurological: A/O x 3  Musculoskeletal: 5/5 strength b/l upper and lower extremities  Skin: multiple macular erythematous back lesions, not crusted, non painful. Similar lesions on b/l arms. Multiple L facial crusted lesions noted.    MEDICATIONS:  MEDICATIONS  (STANDING):  AQUAPHOR (petrolatum Ointment) 1 Application(s) Topical three times a day  dextrose 5%. 1000 milliLiter(s) (50 mL/Hr) IV Continuous <Continuous>  dextrose 50% Injectable 12.5 Gram(s) IV Push once  dextrose 50% Injectable 25 Gram(s) IV Push once  dextrose 50% Injectable 25 Gram(s) IV Push once  fluconAZOLE   Tablet 200 milliGRAM(s) Oral once  heparin  Injectable 5000 Unit(s) SubCutaneous every 8 hours  insulin lispro (HumaLOG) corrective regimen sliding scale   SubCutaneous three times a day before meals  insulin lispro (HumaLOG) corrective regimen sliding scale   SubCutaneous at bedtime  nystatin    Suspension 254166 Unit(s) Oral every 6 hours  piperacillin/tazobactam IVPB. 3.375 Gram(s) IV Intermittent every 8 hours  sodium chloride 0.9%. 1000 milliLiter(s) (75 mL/Hr) IV Continuous <Continuous>      LABS: All Labs Reviewed:                        12.7   8.82  )-----------( 255      ( 15 Mar 2019 07:15 )             38.6     03-14    142  |  105  |  15  ----------------------------<  122<H>  4.6   |  28  |  1.27    Ca    8.9      14 Mar 2019 14:49    TPro  7.8  /  Alb  3.4  /  TBili  1.0  /  DBili  x   /  AST  15  /  ALT  28  /  AlkPhos  60  03-14    PT/INR - ( 14 Mar 2019 14:49 )   PT: 12.0 sec;   INR: 1.08 ratio         PTT - ( 14 Mar 2019 14:49 )  PTT:28.3 sec      Blood Culture:     RADIOLOGY/EKG:    EXAM:  XR CHEST PORTABLE URGENT 1V                            PROCEDURE DATE:  03/14/2019          INTERPRETATION:  Chest portable.    Clinical History: Fever    Comparison: none    Single AP view submitted.    The evaluation of the cardiomediastinal silhouette is limited on portable   technique.    There is subsegmental atelectasis versus pneumonia in the left lung base.    Impression:    Subsegmental atelectasis versus pneumonia in the left lung base.        EXAM:  XR ESOPHAGUS                            PROCEDURE DATE:  03/15/2019          INTERPRETATION:  Clinical information: Dysphagia    TECHNIQUE: An esophagram was performed utilizing barium and effervescent   granules to distend the esophagus.    COMPARISON: None    FINDINGS: Preliminary radiograph of the chest demonstrates mild patchy   opacity at both lung bases and an azygos fissure.    A moderate-sized Zenker's diverticulum is identified measuring 3.3 cm in   maximal dimension. There was persistent residual contrast within the   diverticulum and after several swallows, matt tracheal aspiration was   noted. The examination was then terminated. Evaluation of the remainder   of the esophagus is limited due to underdistention, however nogross   abnormality is identified.    IMPRESSION:    Matt tracheal aspiration likely due to reflux of contrast from a   moderate-sized Zenker's diverticulum.    Findings discussed with Dr. TON Mcleod on March 15, 2019 3:15 PM.        DVT PPX: Hep SQ Q8h    ADVANCED DIRECTIVE:    DISPOSITION:

## 2019-03-15 NOTE — PROGRESS NOTE ADULT - ASSESSMENT
77 yo male presented with fever and malaise    #Sepsis 2/2 Aspiration PNA    #Dysphagia  -Zinker Diverticulum    #Eczema 77 yo male PMH of eczema presented with fever and malaise.    #Sepsis 2/2 Aspiration PNA  ~febrile and tachycardia in ED   -CXR - atelectasis vs. 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  -Speech and swallow consult  -Consider modified barium swallow per ENT   -GI consult- S/w Shani - recommending ENT consult , barium esophagram, empiric Fluconazole   -Continue Fluconazole 200 mg PO QD    #DM2  -6.8  -ISS  -Monitor Finger sticks    #Eczema  -Continue Aquaphor 79 yo male PMH of eczema presented with fever and malaise.    #Sepsis 2/2 Aspiration PNA  ~febrile and tachycardia in ED   -CXR - atelectasis vs. 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  -Speech and swallow consult  -Consider modified barium swallow per ENT   -GI consult- S/w Shani - recommending ENT consult , barium esophagram, empiric Fluconazole   -Continue Fluconazole 200 mg PO QD    #DM2  -6.8  -ISS  -Monitor Finger sticks    #Eczema  -Continue Aquaphor

## 2019-03-15 NOTE — DISCHARGE NOTE NURSING/CASE MANAGEMENT/SOCIAL WORK - NSDCDPATPORTLINK_GEN_ALL_CORE
You can access the [facility] Patient Portal, offered by [facility], by registering with the following website: [email or web address]

## 2019-03-15 NOTE — CONSULT NOTE ADULT - ASSESSMENT
Imp:  Dysphagia in the setting of abx use -- could be candida esophagitis    Rec:  Barium esophagram  Empiric fluconazole  F/U ID eval recs
79 yo male from 's office BIB wife for malaise, fever, difficulty swallowing which causes him to cough. PMHx is only eczema. Admitted last week to , d/c'ed 4 days ago, for cellulitis of face secondary to eczema. was on vanc and unasyn. improved. d/c'ed on po bactrim. finished the bactrim. He saw Dr Tsai (dermatologist) since discharge and Dr Tsai told pt that he had "shingles" of his face where the cellulitis was that it is now resolving and scabs would eventually flake off. Note to be febrile in ED also. He has difficulty swallowing food for the last few days. He is only able to eat softer food, like ice cream, concern for thrush/candida esophagitis, was given diflucan/vancomycin/cefepime.     1. febrile syndrome. oropharyngeal thrush ? esophagitis. eczematous dermatitis  - skin lesions do not appear as shingles  - facial lesions appear improved from prior also has various lesions along extremities as well  - continue with topical ointments   - agree with diflucan 100mg daily/add nystatin swish/swallow  - plan for esophagram for further eval  - xray (-), RVP (-) no diarrhea  - on cefepime 1gm daily  - consider observe off antibiotics afebrile cx (-)  - supportive care  - f/u cbc    2. other issues - care per medicine

## 2019-03-15 NOTE — PROGRESS NOTE ADULT - SUBJECTIVE AND OBJECTIVE BOX
Pt has been seen and examined with FP resident, resident supervised agree with a/p       Patient is a 78y old  Male who presents with a chief complaint of complain of malaise and fever (15 Mar 2019 11:58)          PHYSICAL EXAM:  Vital Signs Last 24 Hrs  T(C): 36.9 (15 Mar 2019 05:12), Max: 37.4 (14 Mar 2019 17:50)  T(F): 98.4 (15 Mar 2019 05:12), Max: 99.4 (14 Mar 2019 17:50)  HR: 68 (15 Mar 2019 05:12) (68 - 91)  BP: 124/74 (15 Mar 2019 05:12) (101/44 - 132/82)  BP(mean): --  RR: 16 (15 Mar 2019 05:12) (16 - 19)  SpO2: 96% (15 Mar 2019 05:12) (96% - 99%)  general- comfortable   -rs-aeeb,crackles present   -cvs-s1s2 normal         A/P    #ct abx, supportive care     #dvt pr

## 2019-03-15 NOTE — CHART NOTE - NSCHARTNOTEFT_GEN_A_CORE
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 Spoke with transfer center [phone] Florentin CALERO at [facility] about patient need for transfer for Zinker's Diverticulum and ENT evaluation.  S/W ENT Dr. Boone, who recommended if there is PNA , stabilize and treat first, continue antibiotics, keep patient NPO, and do modified barium swallow.   If patient aspirating, PEG tube is recommended. If patient is not aspirating, outpatient follow up recommended.    Call Dr. Boone PRN for issues or concerns [phone] Spoke with transfer center [phone] RNFlorentin at [facility] about patient need for transfer for Zienkers  Diverticulum and ENT evaluation.  S/W ENT Dr. Boone, who recommended if there is PNA , stabilize and treat first, continue antibiotics, keep patient NPO, and do modified barium swallow.   If patient aspirating, PEG tube is recommended. If patient is not aspirating, outpatient follow up recommended.    Call Dr. Boone PRN for issues or concerns [phone]

## 2019-03-15 NOTE — CONSULT NOTE ADULT - SUBJECTIVE AND OBJECTIVE BOX
Patient is a 78y old  Male who presents with a chief complaint of complain of malaise and fever (15 Mar 2019 07:34)    HPI:  79 yo male from 's office BIB wife for malaise, fever, difficulty swallowing which causes him to cough. PMHx is only eczema. Admitted last week to , d/c'ed 4 days ago, for cellulitis of face secondary to eczema. was on vanc and unasyn. improved. d/c'ed on po bactrim. finished the bactrim. He saw Dr Tsai (dermatologist) since discharge and Dr Tsai told pt that he had "shingles" of his face where the cellulitis was. And that it is now resolving and scabs would eventually flake off. However, there is no mention of shingles in the hospital record of the admit, including in the ID consult by .No antiviral was given to patient. Pt did not know he had a fever today. He is found to be febrile in ED also. He has difficulty swallowing food for the last few days. He is only able to eat softer food, like ice cream. He denies cp, sob, abd pain, sore throat, n/v/d.    Family hx:  Patient is unable to provide any detail family history this time. (14 Mar 2019 20:53)      PMH: as above  PSH: as above  Meds: per reconciliation sheet, noted below  MEDICATIONS  (STANDING):  AQUAPHOR (petrolatum Ointment) 1 Application(s) Topical three times a day  cefepime  Injectable. 1000 milliGRAM(s) IV Push every 12 hours  dextrose 5%. 1000 milliLiter(s) (50 mL/Hr) IV Continuous <Continuous>  dextrose 50% Injectable 12.5 Gram(s) IV Push once  dextrose 50% Injectable 25 Gram(s) IV Push once  dextrose 50% Injectable 25 Gram(s) IV Push once  fluconAZOLE   Tablet 200 milliGRAM(s) Oral once  heparin  Injectable 5000 Unit(s) SubCutaneous every 8 hours  insulin lispro (HumaLOG) corrective regimen sliding scale   SubCutaneous three times a day before meals  insulin lispro (HumaLOG) corrective regimen sliding scale   SubCutaneous at bedtime  nystatin    Suspension 824370 Unit(s) Oral every 6 hours  sodium chloride 0.9%. 1000 milliLiter(s) (75 mL/Hr) IV Continuous <Continuous>    MEDICATIONS  (PRN):  acetaminophen   Tablet .. 650 milliGRAM(s) Oral every 6 hours PRN Temp greater or equal to 38C (100.4F), Mild Pain (1 - 3)  dextrose 40% Gel 15 Gram(s) Oral once PRN Blood Glucose LESS THAN 70 milliGRAM(s)/deciliter  glucagon  Injectable 1 milliGRAM(s) IntraMuscular once PRN Glucose LESS THAN 70 milligrams/deciliter    Allergies    albuterol (Unknown)    Intolerances      Social: no smoking, no alcohol, no illegal drugs; no recent travel, no exposure to TB  FAMILY HISTORY:     no history of premature cardiovascular disease in first degree relatives    ROS: the patient denies fever, no chills, no HA, no dizziness, no sore throat, no blurry vision, no CP, no palpitations, no SOB, no cough, no abdominal pain, no diarrhea, no N/V, no dysuria, no leg pain, no claudication, no rash, no joint aches, no rectal pain or bleeding, no night sweats  All other systems reviewed and are negative    Vital Signs Last 24 Hrs  T(C): 36.9 (15 Mar 2019 05:12), Max: 39.6 (14 Mar 2019 14:26)  T(F): 98.4 (15 Mar 2019 05:12), Max: 103.2 (14 Mar 2019 14:26)  HR: 68 (15 Mar 2019 05:12) (68 - 128)  BP: 124/74 (15 Mar 2019 05:12) (101/44 - 139/73)  BP(mean): --  RR: 16 (15 Mar 2019 05:12) (16 - 24)  SpO2: 96% (15 Mar 2019 05:12) (90% - 99%)  Daily Height in cm: 172.72 (14 Mar 2019 14:36)    Daily Weight in k.1 (14 Mar 2019 22:40)    PE:    Constitutional: frail looking  HEENT: NC/AT, EOMI, PERRLA, conjunctivae clear; ears and nose atraumatic; pharynx benign  Neck: supple; thyroid not palpable  Back: no tenderness  Respiratory: respiratory effort normal; clear to auscultation  Cardiovascular: S1S2 regular, no murmurs  Abdomen: soft, not tender, not distended, positive BS; liver and spleen WNL  Genitourinary: no suprapubic tenderness  Lymphatic: no LN palpable  Musculoskeletal: no muscle tenderness, no joint swelling or tenderness  Extremities: no pedal edema  Neurological/ Psychiatric: AxOx3, Judgement and insight normal;  moving all extremities  Skin: no rashes; no palpable lesions    Labs: all available labs reviewed                        12.7   8.82  )-----------( 255      ( 15 Mar 2019 07:15 )             38.6     03-14    142  |  105  |  15  ----------------------------<  122<H>  4.6   |  28  |  1.27    Ca    8.9      14 Mar 2019 14:49    TPro  7.8  /  Alb  3.4  /  TBili  1.0  /  DBili  x   /  AST  15  /  ALT  28  /  AlkPhos  60  03-14     LIVER FUNCTIONS - ( 14 Mar 2019 14:49 )  Alb: 3.4 g/dL / Pro: 7.8 gm/dL / ALK PHOS: 60 U/L / ALT: 28 U/L / AST: 15 U/L / GGT: x           Urinalysis Basic - ( 14 Mar 2019 18:27 )    Color: Yellow / Appearance: Clear / S.020 / pH: x  Gluc: x / Ketone: Trace  / Bili: Negative / Urobili: Negative mg/dL   Blood: x / Protein: 30 mg/dL / Nitrite: Negative   Leuk Esterase: Trace / RBC: 0-2 /HPF / WBC 3-5   Sq Epi: x / Non Sq Epi: Occasional / Bacteria: Occasional          Radiology: all available radiological tests reviewed    Advanced directives addressed: full resuscitation Patient is a 78y old  Male who presents with a chief complaint of complain of malaise and fever (15 Mar 2019 07:34)    HPI:  77 yo male from 's office BIB wife for malaise, fever, difficulty swallowing which causes him to cough. PMHx is only eczema. Admitted last week to , d/c'ed 4 days ago, for cellulitis of face secondary to eczema. was on vanc and unasyn. improved. d/c'ed on po bactrim. finished the bactrim. He saw Dr Tsai (dermatologist) since discharge and Dr Tsai told pt that he had "shingles" of his face where the cellulitis was that it is now resolving and scabs would eventually flake off. Note to be febrile in ED also. He has difficulty swallowing food for the last few days. He is only able to eat softer food, like ice cream, concern for thrush/candida esophagitis, was given diflucan/vancomycin/cefepime.     Family hx:  Patient is unable to provide any detail family history this time. (14 Mar 2019 20:53)      PMH: as above  PSH: as above  Meds: per reconciliation sheet, noted below  MEDICATIONS  (STANDING):  AQUAPHOR (petrolatum Ointment) 1 Application(s) Topical three times a day  cefepime  Injectable. 1000 milliGRAM(s) IV Push every 12 hours  dextrose 5%. 1000 milliLiter(s) (50 mL/Hr) IV Continuous <Continuous>  dextrose 50% Injectable 12.5 Gram(s) IV Push once  dextrose 50% Injectable 25 Gram(s) IV Push once  dextrose 50% Injectable 25 Gram(s) IV Push once  fluconAZOLE   Tablet 200 milliGRAM(s) Oral once  heparin  Injectable 5000 Unit(s) SubCutaneous every 8 hours  insulin lispro (HumaLOG) corrective regimen sliding scale   SubCutaneous three times a day before meals  insulin lispro (HumaLOG) corrective regimen sliding scale   SubCutaneous at bedtime  nystatin    Suspension 868178 Unit(s) Oral every 6 hours  sodium chloride 0.9%. 1000 milliLiter(s) (75 mL/Hr) IV Continuous <Continuous>      Allergies    albuterol (Unknown)    Intolerances      Social: no smoking, no alcohol, no illegal drugs; no recent travel, no exposure to TB  FAMILY HISTORY:     no history of premature cardiovascular disease in first degree relatives    ROS: no HA, no dizziness, no sore throat, no blurry vision, no CP, no palpitations, no SOB, no cough, no abdominal pain, no diarrhea, no N/V, no dysuria, no leg pain, no claudication, no rectal pain or bleeding, no night sweats  All other systems reviewed and are negative    Vital Signs Last 24 Hrs  T(C): 36.9 (15 Mar 2019 05:12), Max: 39.6 (14 Mar 2019 14:26)  T(F): 98.4 (15 Mar 2019 05:12), Max: 103.2 (14 Mar 2019 14:26)  HR: 68 (15 Mar 2019 05:12) (68 - 128)  BP: 124/74 (15 Mar 2019 05:12) (101/44 - 139/73)  BP(mean): --  RR: 16 (15 Mar 2019 05:12) (16 - 24)  SpO2: 96% (15 Mar 2019 05:12) (90% - 99%)  Daily Height in cm: 172.72 (14 Mar 2019 14:36)    Daily Weight in k.1 (14 Mar 2019 22:40)    PE:  Constitutional: frail looking  HEENT: NC/AT, EOMI, PERRLA, conjunctivae clear; ears and nose atraumatic; pharynx benign  Neck: supple; thyroid not palpable  Back: no tenderness  Respiratory: respiratory effort normal; clear to auscultation  Cardiovascular: S1S2 regular, no murmurs  Abdomen: soft, not tender, not distended, positive BS; liver and spleen WNL  Genitourinary: no suprapubic tenderness  Lymphatic: no LN palpable  Musculoskeletal: no muscle tenderness, no joint swelling or tenderness  Extremities: no pedal edema  Neurological/ Psychiatric: AxOx3, Judgement and insight normal;  moving all extremities  Skin: L facial skin sloughing/flaky lesions, no erythema, no warmth, various cutaneous maculopapular lesions along extremities    Labs: all available labs reviewed                        12.7   8.82  )-----------( 255      ( 15 Mar 2019 07:15 )             38.6     03-14    142  |  105  |  15  ----------------------------<  122<H>  4.6   |  28  |  1.27    Ca    8.9      14 Mar 2019 14:49    TPro  7.8  /  Alb  3.4  /  TBili  1.0  /  DBili  x   /  AST  15  /  ALT  28  /  AlkPhos  60  03-14     LIVER FUNCTIONS - ( 14 Mar 2019 14:49 )  Alb: 3.4 g/dL / Pro: 7.8 gm/dL / ALK PHOS: 60 U/L / ALT: 28 U/L / AST: 15 U/L / GGT: x           Urinalysis Basic - ( 14 Mar 2019 18:27 )    Color: Yellow / Appearance: Clear / S.020 / pH: x  Gluc: x / Ketone: Trace  / Bili: Negative / Urobili: Negative mg/dL   Blood: x / Protein: 30 mg/dL / Nitrite: Negative   Leuk Esterase: Trace / RBC: 0-2 /HPF / WBC 3-5   Sq Epi: x / Non Sq Epi: Occasional / Bacteria: Occasional          Radiology:    EXAM:  XR CHEST PORTABLE URGENT 1V                            PROCEDURE DATE:  2019          INTERPRETATION:  Chest portable.    Clinical History: Fever    Comparison: none    Single AP view submitted.    The evaluation of the cardiomediastinal silhouette is limited on portable   technique.    There is subsegmental atelectasis versus pneumonia in the left lung base.    Impression:    Subsegmental atelectasis versus pneumonia in the left lung base.    Advanced directives addressed: full resuscitation

## 2019-03-16 LAB
ANION GAP SERPL CALC-SCNC: 11 MMOL/L — SIGNIFICANT CHANGE UP (ref 5–17)
BUN SERPL-MCNC: 10 MG/DL — SIGNIFICANT CHANGE UP (ref 7–23)
CALCIUM SERPL-MCNC: 8.6 MG/DL — SIGNIFICANT CHANGE UP (ref 8.5–10.1)
CHLORIDE SERPL-SCNC: 104 MMOL/L — SIGNIFICANT CHANGE UP (ref 96–108)
CO2 SERPL-SCNC: 27 MMOL/L — SIGNIFICANT CHANGE UP (ref 22–31)
CREAT SERPL-MCNC: 1.23 MG/DL — SIGNIFICANT CHANGE UP (ref 0.5–1.3)
GLUCOSE BLDC GLUCOMTR-MCNC: 107 MG/DL — HIGH (ref 70–99)
GLUCOSE BLDC GLUCOMTR-MCNC: 111 MG/DL — HIGH (ref 70–99)
GLUCOSE BLDC GLUCOMTR-MCNC: 120 MG/DL — HIGH (ref 70–99)
GLUCOSE BLDC GLUCOMTR-MCNC: 148 MG/DL — HIGH (ref 70–99)
GLUCOSE SERPL-MCNC: 128 MG/DL — HIGH (ref 70–99)
HCT VFR BLD CALC: 43.6 % — SIGNIFICANT CHANGE UP (ref 39–50)
HGB BLD-MCNC: 14.2 G/DL — SIGNIFICANT CHANGE UP (ref 13–17)
MCHC RBC-ENTMCNC: 30.7 PG — SIGNIFICANT CHANGE UP (ref 27–34)
MCHC RBC-ENTMCNC: 32.6 GM/DL — SIGNIFICANT CHANGE UP (ref 32–36)
MCV RBC AUTO: 94.4 FL — SIGNIFICANT CHANGE UP (ref 80–100)
NRBC # BLD: 0 /100 WBCS — SIGNIFICANT CHANGE UP (ref 0–0)
PLATELET # BLD AUTO: 305 K/UL — SIGNIFICANT CHANGE UP (ref 150–400)
POTASSIUM SERPL-MCNC: 4.1 MMOL/L — SIGNIFICANT CHANGE UP (ref 3.5–5.3)
POTASSIUM SERPL-SCNC: 4.1 MMOL/L — SIGNIFICANT CHANGE UP (ref 3.5–5.3)
RBC # BLD: 4.62 M/UL — SIGNIFICANT CHANGE UP (ref 4.2–5.8)
RBC # FLD: 13.3 % — SIGNIFICANT CHANGE UP (ref 10.3–14.5)
SODIUM SERPL-SCNC: 142 MMOL/L — SIGNIFICANT CHANGE UP (ref 135–145)
WBC # BLD: 6.68 K/UL — SIGNIFICANT CHANGE UP (ref 3.8–10.5)
WBC # FLD AUTO: 6.68 K/UL — SIGNIFICANT CHANGE UP (ref 3.8–10.5)

## 2019-03-16 RX ORDER — HYDROCORTISONE 1 %
1 OINTMENT (GRAM) TOPICAL EVERY 6 HOURS
Qty: 0 | Refills: 0 | Status: DISCONTINUED | OUTPATIENT
Start: 2019-03-16 | End: 2019-03-18

## 2019-03-16 RX ADMIN — Medication 1 APPLICATION(S): at 23:19

## 2019-03-16 RX ADMIN — Medication 1 APPLICATION(S): at 15:50

## 2019-03-16 RX ADMIN — Medication 500000 UNIT(S): at 02:05

## 2019-03-16 RX ADMIN — PIPERACILLIN AND TAZOBACTAM 25 GRAM(S): 4; .5 INJECTION, POWDER, LYOPHILIZED, FOR SOLUTION INTRAVENOUS at 02:04

## 2019-03-16 RX ADMIN — FLUCONAZOLE 100 MILLIGRAM(S): 150 TABLET ORAL at 11:39

## 2019-03-16 RX ADMIN — Medication 500000 UNIT(S): at 05:40

## 2019-03-16 RX ADMIN — Medication 500000 UNIT(S): at 11:39

## 2019-03-16 RX ADMIN — PIPERACILLIN AND TAZOBACTAM 25 GRAM(S): 4; .5 INJECTION, POWDER, LYOPHILIZED, FOR SOLUTION INTRAVENOUS at 08:06

## 2019-03-16 RX ADMIN — Medication 500000 UNIT(S): at 17:29

## 2019-03-16 RX ADMIN — PIPERACILLIN AND TAZOBACTAM 25 GRAM(S): 4; .5 INJECTION, POWDER, LYOPHILIZED, FOR SOLUTION INTRAVENOUS at 17:28

## 2019-03-16 RX ADMIN — Medication 1 APPLICATION(S): at 04:32

## 2019-03-16 NOTE — SWALLOW BEDSIDE ASSESSMENT ADULT - ORAL PHASE
Bolus formation/transfer were mechanically functional for age and pt cleared all oral debris on his own.

## 2019-03-16 NOTE — SWALLOW BEDSIDE ASSESSMENT ADULT - SWALLOW EVAL: CRITERIA FOR SKILLED INTERVENTION MET
I DO NOT FEEL THAT ACUTE SPEECH PATHOLOGY INTERVENTION WOULD CHANGE CLINICAL MANAGEMENT OR BE OF CLINICAL BENEFIT WHILE PT IN HOSPITAL. PT'S SPEECH-LANGUAGE ABILITIES ARE WITHIN FUNCTIONAL/NORMAL PARAMETERS AND HIS OROPHARYNGEAL SWALLOWING ABILITIES ARE FELT TO BE MECHANICALLY WITHIN FUNCTIONAL PARAMETERS FOR AGE AS WELL. IN TERMS OF HIS ZENKER'S DIVERTICULUM, THIS IS NOT AN ISSUE THAT CAN BE REMEDIED BY TRADITIONAL SWALLOWING THERAPY. AS SUCH, THIS SERVICE WILL NOT ACTIVELY FOLLOW WHILE IN HOUSE. RECONSULT PRN. SUGGEST ENT CONSULT AS INPATIENT OR OUTPATIENT TO ASSESS CANDIDACY FOR TRANSORAL STAPLING OF ZENKER'S OR A ZENKER'S DIVERTICULECTOMY. CONSIDER REFERRAL TO DR KNAPP. I DO NOT FEEL THAT ACUTE SPEECH PATHOLOGY INTERVENTION WOULD CHANGE CLINICAL MANAGEMENT OR BE OF CLINICAL BENEFIT WHILE PT IN HOSPITAL. PT'S SPEECH-LANGUAGE ABILITIES ARE WITHIN FUNCTIONAL/NORMAL PARAMETERS AND HIS OROPHARYNGEAL SWALLOWING ABILITIES ARE FELT TO BE MECHANICALLY WITHIN FUNCTIONAL PARAMETERS FOR AGE AS WELL. IN TERMS OF HIS ZENKER'S DIVERTICULUM, THIS IS NOT AN ISSUE THAT CAN BE REMEDIED BY TRADITIONAL SWALLOWING THERAPY BY SPEECH PATHOLOGY. AS SUCH, THIS SERVICE WILL NOT ACTIVELY FOLLOW WHILE IN HOUSE. RECONSULT PRN. SUGGEST ENT CONSULT AS INPATIENT OR OUTPATIENT TO ASSESS CANDIDACY FOR TRANSORAL STAPLING OF ZENKER'S OR A ZENKER'S DIVERTICULECTOMY. CONSIDER REFERRAL TO DR KNAPP.

## 2019-03-16 NOTE — PROGRESS NOTE ADULT - ASSESSMENT
77 yo male PMH of eczema presented with fever and malaise.    #Sepsis 2/2 Aspiration PNA  ~febrile and tachycardia in ED   -CXR - atelectasis vs. 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  -Speech and swallow consult- Dysphagia 3 diet ordered   -Consider modified barium swallow per ENT -ordered  -GI consult- S/w Shani - recommending ENT consult , barium esophagram, empiric Fluconazole   -Continue Fluconazole 200 mg PO QD    #DM2  -6.8  -ISS  -Monitor Finger sticks    #Eczema  -Continue Aquaphor 77 yo male PMH of eczema presented with fever and malaise.    #Sepsis 2/2 Aspiration PNA  ~febrile and tachycardia in ED   -CXR - atelectasis vs. 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 see chart note, no surgical intervention indicated at this time; ENT refusing consult at this time  -s/p XR esophagus- Adithya tracheal aspiration likely due to reflux of contrast from a moderate-sized Zenker's diverticulum.  -Speech and swallow consult- Dysphagia 3 diet ordered   -Consider modified barium swallow per ENT -ordered  -GI consult- S/w Shani - recommending ENT consult , barium esophagram, empiric Fluconazole   -Continue Fluconazole 200 mg PO QD    #DM2  -6.8  -ISS  -Monitor Finger sticks    #Eczema  -Continue Aquaphor  -Topical steroids 79 yo male PMH of eczema presented with fever and malaise.    #Sepsis 2/2 Aspiration PNA  ~febrile and tachycardia in ED   -CXR - atelectasis vs. 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  -Speech and swallow consult- Dysphagia 3 diet ordered   - Aspiration Precautions  -Consider modified barium swallow per ENT -ordered  -GI consult- S/w Shani - recommending ENT consult , barium esophagram, empiric Fluconazole   -Continue Fluconazole 200 mg PO QD      #DM2  -6.8  -ISS  -Monitor Finger sticks    #Eczema  -Continue Aquaphor  -Topical steroids

## 2019-03-16 NOTE — SWALLOW BEDSIDE ASSESSMENT ADULT - SWALLOW EVAL: RECOMMENDED FEEDING/EATING TECHNIQUES
maintain upright posture during/after eating for 30 mins/crush medication (when feasible)/alternate food with liquid/small sips/bites

## 2019-03-16 NOTE — PROGRESS NOTE ADULT - SUBJECTIVE AND OBJECTIVE BOX
HPI  77 yo male presents with fever, difficulty swallowing which causes him to cough. Admitted last week to  for cellulitis of face secondary to eczema. Pt was on vanc and unasyn. and improved. He later completed bactrim course. Pt saw his Dermatologist who told pt he had Shingles. Patient was febrile in the ED. Patient reports progressive dysphagia since discharge.      SUBJECTIVE:   3/16/19    Pt seen and evaluated at bedside today. Denied any complaints when seen. No fevers, chills, chest pain or shortness of breath. Pt reports mild itchiness of skin. Discussed with pt ENT recommendations for outpatient follow up.  Also discussed risk of aspiration with patient and currently treating with antibiotics. All questions answered. ENT recommending modified barium swallow however not done on the weekend per nursing.    REVIEW OF SYSTEMS:    CONSTITUTIONAL: No weakness, no fevers , no chills  EYES/ENT: No visual changes;  No vertigo, difficulty swallowing  NECK: No pain or stiffness  RESPIRATORY: No cough, wheezing, hemoptysis; No shortness of breath  CARDIOVASCULAR: No chest pain or palpitations  GASTROINTESTINAL: No abdominal or epigastric pain. No nausea, vomiting, or hematemesis; No diarrhea or constipation. No melena or hematochezia.  GENITOURINARY: No dysuria, frequency or hematuria  NEUROLOGICAL: No numbness or weakness  SKIN: + lesions on face, arms and back , itchy skin   All other review of systems is negative unless indicated above    Vital Signs Last 24 Hrs  T(C): 36.4 (16 Mar 2019 17:06), Max: 37.1 (15 Mar 2019 22:35)  T(F): 97.6 (16 Mar 2019 17:06), Max: 98.8 (15 Mar 2019 22:35)  HR: 82 (16 Mar 2019 17:06) (67 - 85)  BP: 138/85 (16 Mar 2019 17:06) (123/79 - 138/85)  BP(mean): --  RR: 18 (16 Mar 2019 17:06) (16 - 18)  SpO2: 98% (16 Mar 2019 17:06) (98% - 98%)    I&O's Summary      CAPILLARY BLOOD GLUCOSE      POCT Blood Glucose.: 115 mg/dL (15 Mar 2019 14:52)      PHYSICAL EXAM:    Constitutional: NAD, awake and alert  HEENT: PERR, EOMI,  Neck: Soft and supple, No LAD  Respiratory: Breath sounds are clear bilaterally, No wheezing, rales or rhonchi  Cardiovascular: S1 and S2, regular rate and rhythm, no Murmurs, gallops or rubs  Gastrointestinal: Bowel Sounds present, soft, nontender, nondistended, no guarding, no rebound  Extremities: No peripheral edema  Vascular: 2+ peripheral pulses  Neurological: A/O x 3  Musculoskeletal: 5/5 strength b/l upper and lower extremities  Skin: multiple macular erythematous back lesions, not crusted, non painful. Similar lesions on b/l arms. Multiple L facial crusted lesions noted.    MEDICATIONS  (STANDING):  AQUAPHOR (petrolatum Ointment) 1 Application(s) Topical three times a day  dextrose 5% + sodium chloride 0.45%. 1000 milliLiter(s) (75 mL/Hr) IV Continuous <Continuous>  dextrose 5%. 1000 milliLiter(s) (50 mL/Hr) IV Continuous <Continuous>  dextrose 50% Injectable 12.5 Gram(s) IV Push once  dextrose 50% Injectable 25 Gram(s) IV Push once  dextrose 50% Injectable 25 Gram(s) IV Push once  fluconAZOLE   Tablet 100 milliGRAM(s) Oral daily  heparin  Injectable 5000 Unit(s) SubCutaneous every 8 hours  insulin lispro (HumaLOG) corrective regimen sliding scale   SubCutaneous three times a day before meals  insulin lispro (HumaLOG) corrective regimen sliding scale   SubCutaneous at bedtime  nystatin    Suspension 420850 Unit(s) Oral every 6 hours  piperacillin/tazobactam IVPB. 3.375 Gram(s) IV Intermittent every 8 hours    MEDICATIONS  (PRN):  acetaminophen   Tablet .. 650 milliGRAM(s) Oral every 6 hours PRN Temp greater or equal to 38C (100.4F), Mild Pain (1 - 3)  dextrose 40% Gel 15 Gram(s) Oral once PRN Blood Glucose LESS THAN 70 milliGRAM(s)/deciliter  glucagon  Injectable 1 milliGRAM(s) IntraMuscular once PRN Glucose LESS THAN 70 milligrams/deciliter  hydrocortisone 0.5% Cream 1 Application(s) Topical every 6 hours PRN Rash and/or Itching      LABS: All Labs Reviewed:                14.2   6.68  )-----------( 305      ( 16 Mar 2019 07:42 )             43.6     03-16    142  |  104  |  10  ----------------------------<  128<H>  4.1   |  27  |  1.23    Ca    8.6      16 Mar 2019 07:42        Blood Culture:     RADIOLOGY/EKG:    EXAM:  XR CHEST PORTABLE URGENT 1V                            PROCEDURE DATE:  03/14/2019          INTERPRETATION:  Chest portable.    Clinical History: Fever    Comparison: none    Single AP view submitted.    The evaluation of the cardiomediastinal silhouette is limited on portable   technique.    There is subsegmental atelectasis versus pneumonia in the left lung base.    Impression:    Subsegmental atelectasis versus pneumonia in the left lung base.        EXAM:  XR ESOPHAGUS                            PROCEDURE DATE:  03/15/2019          INTERPRETATION:  Clinical information: Dysphagia    TECHNIQUE: An esophagram was performed utilizing barium and effervescent   granules to distend the esophagus.    COMPARISON: None    FINDINGS: Preliminary radiograph of the chest demonstrates mild patchy   opacity at both lung bases and an azygos fissure.    A moderate-sized Zenker's diverticulum is identified measuring 3.3 cm in   maximal dimension. There was persistent residual contrast within the   diverticulum and after several swallows, matt tracheal aspiration was   noted. The examination was then terminated. Evaluation of the remainder   of the esophagus is limited due to underdistention, however nogross   abnormality is identified.    IMPRESSION:    Matt tracheal aspiration likely due to reflux of contrast from a   moderate-sized Zenker's diverticulum.    Findings discussed with Dr. TON Mcleod on March 15, 2019 3:15 PM.        DVT PPX: Hep SQ Q8h    ADVANCED DIRECTIVE:    DISPOSITION:

## 2019-03-16 NOTE — SWALLOW BEDSIDE ASSESSMENT ADULT - SWALLOW EVAL: RECOMMENDED DIET
PT IS AT INCREASED RISK FOR EPISODIC POST PRANDIAL ASPIRATION OF FOOD STASIS THAT IS RETAINED IN HIS ZENKER'S DIVERTICULUM AND SUBSEQUENTLY REFLUXES POSTERIORALLY.  WITH THAT BEING STATED, I DO NOT FEEL THAT HE ASPIRATES PRANDIALLY. PT ACCEPTS POST PRANDIAL ASPIRATION RISKS AT THIS TIME AND DOES NOT WISH TO BE NPO/WISHES TO RESUME BEING ON REGULAR TEXTURE DIET WITH THIN LIQUIDS. HE HAS HAD HIS ZENKER'S SYMPTOMS FOR WELL OVER A YEAR AND FEELS THAT HE HAS TOLERATED FOODS ON REGULAR TEXTURE DIET. PT IS AT INCREASED RISK FOR EPISODIC POST PRANDIAL ASPIRATION OF FOOD STASIS THAT IS RETAINED IN HIS ZENKER'S DIVERTICULUM AND SUBSEQUENTLY REFLUXES SUPERIORALLY.  WITH THAT BEING STATED, I DO NOT FEEL THAT HE ASPIRATES PRANDIALLY. PT ACCEPTS POST PRANDIAL ASPIRATION RISKS AT THIS TIME AND DOES NOT WISH TO BE NPO/WISHES TO RESUME BEING ON REGULAR TEXTURE DIET WITH THIN LIQUIDS. HE HAS HAD HIS ZENKER'S SYMPTOMS FOR WELL OVER A YEAR AND FEELS THAT HE HAS TOLERATED FOODS ON REGULAR TEXTURE DIET.

## 2019-03-16 NOTE — SWALLOW BEDSIDE ASSESSMENT ADULT - SWALLOW EVAL: DIAGNOSIS
1) The pt exhibits Oropharyngeal Swallowing abilities which subjectively appear to be within functional parameters for age during bedside swallowing testing as well as upon my review on PACS of recent barium esophagram conducted yesterday.  NO behavioral aspiration signs exhibited on exam today. This profile is superimposed upon underlying documented Zenker's Diverticulum as noted on esophagram yesterday. At time, of esophagram, the pt periodically demonstrated post prandial aspiration of barium material that was refluxed superiorally from his Zenker's Diverticulum.   2) Pt is alert, interactive, and oriented x3+. He was able to verbalize during communicative probes and in conversation without a primary speech-language pathology. He is at communicative baseline. 1) The pt exhibits Oropharyngeal Swallowing abilities which subjectively appear to be mechanically within functional parameters for age during bedside swallowing testing as well as upon my review on PACS of recent barium esophagram conducted yesterday.  NO behavioral aspiration signs exhibited on exam today. This profile is superimposed upon underlying documented Zenker's Diverticulum as noted on esophagram yesterday. At time, of esophagram, the pt did NOT prandially aspirate but did periodically demonstrate post prandial aspiration of barium material that was refluxed superiorally from his Zenker's Diverticulum.   2) Pt is alert, interactive, and oriented x3+. He was able to verbalize during communicative probes and in conversation without a primary speech-language pathology. He is at communicative baseline.

## 2019-03-16 NOTE — SWALLOW BEDSIDE ASSESSMENT ADULT - COMMENTS
The patient was admitted to  with fever, malaise, fatigue and post prandial coughing when eating. Hospital course is notable for sepsis, and left pneumonia. He underwent an esophagram in hospital which revealed the presence of a moderate sized Zenker's Diverticulum. Additionally, barium was noted to stagnate in the Zenker's Diverticulum with subsequent post prandial tracheal aspiration of material that stagnated in the diverticulum/was subsequently refluxed posteriorally, This profile is superimposed upon a history of recent facial cellulitis/shingles and eczema. The patient was admitted to  with fever, malaise, fatigue and post prandial coughing when eating. Pt stated that post prandial coughing has occurred for over 1 year and he even expectorates undigested foods at times. Hospital course is notable for sepsis, and left pneumonia. He underwent an esophagram in hospital which revealed the presence of a moderate sized Zenker's Diverticulum. Additionally, barium was noted to stagnate in the Zenker's Diverticulum with subsequent post prandial tracheal aspiration of material that stagnated in the diverticulum/was subsequently refluxed posteriorally, This profile is superimposed upon a history of recent facial cellulitis/shingles, eczema, and pre diabetes. The patient was admitted to  with fever, malaise, fatigue and post prandial coughing when eating. Pt stated that post prandial coughing has occurred for over 1 year and he even expectorates undigested foods at times. Hospital course is notable for sepsis, and left pneumonia. He underwent an esophagram in hospital which revealed the presence of a moderate sized Zenker's Diverticulum. Additionally, barium was noted to stagnate in the Zenker's Diverticulum with subsequent post prandial tracheal aspiration of material that stagnated in the diverticulum/was subsequently refluxed superiorally. , This profile is superimposed upon a history of recent facial cellulitis/shingles, eczema, and pre-diabetes.

## 2019-03-16 NOTE — SWALLOW BEDSIDE ASSESSMENT ADULT - ORAL PREPARATORY PHASE
Pt was oriented to feeding situ, readily accepted PO and demonstrated functional labial grading on utensils. No dribbling was noted.

## 2019-03-16 NOTE — SWALLOW BEDSIDE ASSESSMENT ADULT - ASPIRATION PRECAUTIONS
MAINTAIN ASPIRATION PRECAUTIONS AS A CONSERVATIVE MEASURE. NOTE THAT THE PT DID NOT APPEAR TO BE ASPIRATING ON CLINICAL BEDSIDE SWALLOW TESTING. THOUGH, HE IS AT RELATIVELY HEIGHTENED RISK FOR EPISODIC POST PRANDIAL ASPIRATION OF REFLUXED MATERIAL THAT STAGNATES IN HIS ZENKER'S/REFLUXES SUPERIORALLY./yes

## 2019-03-16 NOTE — SWALLOW BEDSIDE ASSESSMENT ADULT - ASR SWALLOW RECOMMEND DIAG
I DO NOT FEEL THAT A MODIFIED BARIUM SWALLOWING STUDY WOULD CHANGE CLINICAL MANAGEMENT. I REVIEWED ESOPHAGRAM ON PACS AND HIS OROPHARYNGEAL SWALLOW INTEGRITY WAS WFL FROM A MECHANICAL STANCE AND NO PRANDIAL ASPIRATION WAS WITNESSED. AN UNDERLYING ZENKER'S WAS PRESENT AS PREVIOUSLY STATED.

## 2019-03-16 NOTE — CHART NOTE - NSCHARTNOTEFT_GEN_A_CORE
Spoke with ENT Dr. Boone about this patient , afebrile for 24 hrs and now stable. ENT does not accent transfer for any intervention at this time. Recommended obtaining modified barium swallow and follow up as an outpatient.

## 2019-03-16 NOTE — SWALLOW BEDSIDE ASSESSMENT ADULT - NS SPL SWALLOW CLINIC TRIAL FT
No overt aspiration signs noted on exam and odynophagia/dyspepsia were denied despite history of known underlying Zenker's.

## 2019-03-16 NOTE — PROGRESS NOTE ADULT - SUBJECTIVE AND OBJECTIVE BOX
Pt has been seen and examined with FP resident, resident supervised agree with a/p       Patient is a 78y old  Male who presents with a chief complaint of complain of malaise and fever (15 Mar 2019 11:58)          PHYSICAL EXAM:    general- comfortable   -rs-aeeb,crackles present   -cvs-s1s2 normal         A/P    #ct abx, supportive care     #dvt pr

## 2019-03-16 NOTE — SWALLOW BEDSIDE ASSESSMENT ADULT - SLP GENERAL OBSERVATIONS
Pt is alert, interactive, and oriented x3+. He was able to verbalize during communicative probes and in conversation without a primary speech-language pathology. He is at communicative baseline.  When asked about swallowing, the pt reported a longstanding history of inconsistently coughing post prandially in a random manner when eating and sometimes expectorating undigested foods which are not food consistency specific. The pt denied ever having pneumonia in the past, present time excluded.

## 2019-03-16 NOTE — SWALLOW BEDSIDE ASSESSMENT ADULT - ADDITIONAL RECOMMENDATIONS
1) THE PT WISHES TO BE ON A REGULAR TEXTURE DIET WITH THIN LIQUIDS AS WAS THE CASE PTH. THIS IS REASONABLE AS HE HAS BEEN ON THIS DIET HIS ENTIRE ADULT LIFE AND I SUSPECT THAT HIS ZENKER'S HAS BEEN A LONGSTANDING ISSUE. IF HE WERE TO BE NPO AND HAVE A FEEDING TUBE PLACED, HE WOULD STILL BE AT HIGH RISK TO ASPIRATE HIS SALIVA THAT POCKETS IN HIS ESOPHAGUS POST PRANDIALLY AT TIMES WHEN SALIVA STASIS REFLUXES SUPERIORALLY  FROM ZENKER'S. PT IS AWARE THAT HE IS AT RELATIVELY INCREASED RISK FOR POST PRANDIAL ASPIRATION OF FOODS THAT STAGNATE IN HIS ZENKER' AND HE ACCEPTS THESE RISKS. HE HAS BEEN COUNSELED REGARDING STRATEGIES TO MAXIMIZE SWALLOW SAFETY SUCH AS EATING AT 90 DEGREE ANGLE/REMAINING UPRIGHT AT LEAST 30 MINUTES AFTER MEALS, ACCEPTING PO IN SMALL BITES/SIPS AND ALTERNATING FOODS WITH LIQUIDS OFTEN WHEN UPRIGHT.    2) PT WILL NEED TO BE SEEN BY AN ENT TO ASSESS CANDIDACY FOR EITHER A TRANSORAL STAPLING OF HIS ZENKER'S OR A ZENKER'S DIVERTICULECTOMY. CONSIDER CONSULT WITH DR KNAPP. SUCCESSFUL SURGERY COULD HOPEFULLY ELIMINATE HIS RISK FOR POST PRANDIAL REFLUX. NOTE THAT IF PT DOES NOT UNDERGO SURGERY OR SURGERY IS UNSUCCESSFUL, HE STILL WISHES TO BE ON A REGULAR TEXTURE DIET WITH THIN LIQUIDS WHILE ACCEPTING RISKS OF POST PRANDIAL ASPIRATION DUE TO HIS ZENKER'S. AGAIN, I DO NOT FEEL THAT PT IS BEING UNREASONABLE BY FOREGOING A FEEDING TUBE AND BEING ON AN ORAL DIET. AFTER ALL, HE IN ALL LIKELIHOOD HAS HAD HIS ZENKER'S DIVERTICULUM FOR YEARS AND HAS THRIVED WELL ON AN ORAL DIET. 1) THE PT WISHES TO BE ON A REGULAR TEXTURE DIET WITH THIN LIQUIDS AS WAS THE CASE PTH. THIS IS REASONABLE AS HE HAS BEEN ON THIS DIET HIS ENTIRE ADULT LIFE WHILE THRIVING WELL AND I SUSPECT THAT HIS ZENKER'S HAS BEEN A LONGSTANDING ISSUE. IF HE WERE TO BE NPO AND HAVE A FEEDING TUBE PLACED, HE WOULD STILL BE AT HIGH RISK TO ASPIRATE HIS SALIVA THAT POCKETS IN HIS ZENKER'S DIVERTICULUM POST PRANDIALLY/REFLUXES SUPERIORALLY  FROM ZENKER'S. PT IS AWARE THAT HE IS AT RELATIVELY INCREASED RISK FOR POST PRANDIAL ASPIRATION OF FOODS THAT STAGNATE IN HIS ZENKER'S AND HE ACCEPTS THESE RISKS. HE HAS BEEN COUNSELED REGARDING STRATEGIES TO MAXIMIZE SWALLOW SAFETY SUCH AS EATING AT 90 DEGREE ANGLE/REMAINING UPRIGHT AT LEAST 30 MINUTES AFTER MEALS, ACCEPTING PO IN SMALL BITES/SIPS AND ALTERNATING FOODS WITH LIQUIDS OFTEN WHEN UPRIGHT.    2) PT WILL NEED TO BE SEEN BY AN ENT TO ASSESS CANDIDACY FOR EITHER A TRANSORAL STAPLING OF HIS ZENKER'S OR A ZENKER'S DIVERTICULECTOMY. CONSIDER CONSULT WITH DR KNAPP. SUCCESSFUL SURGERY COULD HOPEFULLY ELIMINATE HIS RISK FOR POST PRANDIAL ASPIRATION OF REFLUX. NOTE THAT IF PT DOES NOT UNDERGO SURGERY OR SURGERY IS UNSUCCESSFUL, HE STILL WISHES TO BE ON A REGULAR TEXTURE DIET WITH THIN LIQUIDS WHILE ACCEPTING RISKS OF POST PRANDIAL ASPIRATION DUE TO HIS ZENKER'S. AGAIN, I DO NOT FEEL THAT PT IS BEING UNREASONABLE BY FOREGOING A FEEDING TUBE AND BEING ON AN ORAL DIET. AFTER ALL, HE IN ALL LIKELIHOOD HAS HAD HIS ZENKER'S DIVERTICULUM FOR YEARS AND HAS THRIVED WELL ON AN ORAL DIET.

## 2019-03-17 LAB
ANION GAP SERPL CALC-SCNC: 8 MMOL/L — SIGNIFICANT CHANGE UP (ref 5–17)
BUN SERPL-MCNC: 12 MG/DL — SIGNIFICANT CHANGE UP (ref 7–23)
CALCIUM SERPL-MCNC: 8.2 MG/DL — LOW (ref 8.5–10.1)
CHLORIDE SERPL-SCNC: 104 MMOL/L — SIGNIFICANT CHANGE UP (ref 96–108)
CO2 SERPL-SCNC: 28 MMOL/L — SIGNIFICANT CHANGE UP (ref 22–31)
CREAT SERPL-MCNC: 1.32 MG/DL — HIGH (ref 0.5–1.3)
GLUCOSE BLDC GLUCOMTR-MCNC: 115 MG/DL — HIGH (ref 70–99)
GLUCOSE BLDC GLUCOMTR-MCNC: 119 MG/DL — HIGH (ref 70–99)
GLUCOSE BLDC GLUCOMTR-MCNC: 178 MG/DL — HIGH (ref 70–99)
GLUCOSE SERPL-MCNC: 122 MG/DL — HIGH (ref 70–99)
HCT VFR BLD CALC: 41.2 % — SIGNIFICANT CHANGE UP (ref 39–50)
HGB BLD-MCNC: 13.3 G/DL — SIGNIFICANT CHANGE UP (ref 13–17)
MCHC RBC-ENTMCNC: 30.9 PG — SIGNIFICANT CHANGE UP (ref 27–34)
MCHC RBC-ENTMCNC: 32.3 GM/DL — SIGNIFICANT CHANGE UP (ref 32–36)
MCV RBC AUTO: 95.6 FL — SIGNIFICANT CHANGE UP (ref 80–100)
NRBC # BLD: 0 /100 WBCS — SIGNIFICANT CHANGE UP (ref 0–0)
PLATELET # BLD AUTO: 289 K/UL — SIGNIFICANT CHANGE UP (ref 150–400)
POTASSIUM SERPL-MCNC: 3.8 MMOL/L — SIGNIFICANT CHANGE UP (ref 3.5–5.3)
POTASSIUM SERPL-SCNC: 3.8 MMOL/L — SIGNIFICANT CHANGE UP (ref 3.5–5.3)
RBC # BLD: 4.31 M/UL — SIGNIFICANT CHANGE UP (ref 4.2–5.8)
RBC # FLD: 13.5 % — SIGNIFICANT CHANGE UP (ref 10.3–14.5)
SODIUM SERPL-SCNC: 140 MMOL/L — SIGNIFICANT CHANGE UP (ref 135–145)
WBC # BLD: 6.57 K/UL — SIGNIFICANT CHANGE UP (ref 3.8–10.5)
WBC # FLD AUTO: 6.57 K/UL — SIGNIFICANT CHANGE UP (ref 3.8–10.5)

## 2019-03-17 RX ADMIN — Medication 500000 UNIT(S): at 17:38

## 2019-03-17 RX ADMIN — Medication 1: at 11:51

## 2019-03-17 RX ADMIN — PIPERACILLIN AND TAZOBACTAM 25 GRAM(S): 4; .5 INJECTION, POWDER, LYOPHILIZED, FOR SOLUTION INTRAVENOUS at 01:55

## 2019-03-17 RX ADMIN — FLUCONAZOLE 100 MILLIGRAM(S): 150 TABLET ORAL at 11:47

## 2019-03-17 RX ADMIN — PIPERACILLIN AND TAZOBACTAM 25 GRAM(S): 4; .5 INJECTION, POWDER, LYOPHILIZED, FOR SOLUTION INTRAVENOUS at 17:38

## 2019-03-17 RX ADMIN — PIPERACILLIN AND TAZOBACTAM 25 GRAM(S): 4; .5 INJECTION, POWDER, LYOPHILIZED, FOR SOLUTION INTRAVENOUS at 10:23

## 2019-03-17 RX ADMIN — Medication 1 APPLICATION(S): at 05:44

## 2019-03-17 RX ADMIN — Medication 500000 UNIT(S): at 11:47

## 2019-03-17 RX ADMIN — Medication 1 APPLICATION(S): at 14:46

## 2019-03-17 RX ADMIN — Medication 500000 UNIT(S): at 05:44

## 2019-03-17 RX ADMIN — Medication 1 APPLICATION(S): at 23:23

## 2019-03-17 RX ADMIN — Medication 500000 UNIT(S): at 01:55

## 2019-03-17 NOTE — PROGRESS NOTE ADULT - ASSESSMENT
77 yo male PMH of eczema presented with fever and malaise.    # Sepsis 2/2 Aspiration PNA  - Febrile and tachycardia in ED   - CXR - atelectasis vs. PNA L lung base   - Continue Zosyn q8h  - ID consult appreciated    # Dysphagia 2/2 Candida Esophagitis  - Zenker Diverticulum- 3.3 cm in maximal dimension  - D/W ENT at [facility] see chart note, no surgical intervention indicated at this time; ENT refusing consult at this time  - s/p XR esophagus- Adithya tracheal aspiration likely due to reflux of contrast from a moderate-sized Zenker's diverticulum.  - Speech and swallow consult- Dysphagia 3 diet ordered   - Aspiration Precautions  - Modified barium swallow tomorrow  - GI consult- S/w Shani - recommending ENT consult , barium esophagram, empiric Fluconazole   - Continue Fluconazole 200 mg PO QD and Nystatin     # DM2  - A1C @ 6.8  - ISS  - Monitor Finger sticks  - Consistent carb diet    # Eczema  - Continue Aquaphor  -Topical steroids     # DVT PPX  - Hep SQ Q8h

## 2019-03-17 NOTE — PROGRESS NOTE ADULT - SUBJECTIVE AND OBJECTIVE BOX
Pt has been seen and examined with FP resident, resident supervised agree with a/p       Patient is a 78y old  Male who presents with a chief complaint of complain of malaise and fever (16 Mar 2019 17:39)      PHYSICAL EXAM:  Vital Signs Last 24 Hrs  T(C): 36.7 (17 Mar 2019 12:19), Max: 36.8 (17 Mar 2019 05:49)  T(F): 98.1 (17 Mar 2019 12:19), Max: 98.3 (17 Mar 2019 05:49)  HR: 81 (17 Mar 2019 12:19) (81 - 98)  BP: 107/55 (17 Mar 2019 12:19) (107/55 - 138/85)  BP(mean): --  RR: 18 (17 Mar 2019 12:19) (18 - 18)  SpO2: 96% (17 Mar 2019 12:19) (96% - 99%)  general- comfortable   -rs-aeeb, crackles present   -cvs-s1s2 normal   -p/a-soft,bs+  -extremity- no asymmetrical swelling noted   -cns- non focal         A/P    #ct abx, supportive care     #Barium modified follow test to follow, discharge plan

## 2019-03-17 NOTE — PROGRESS NOTE ADULT - SUBJECTIVE AND OBJECTIVE BOX
HPI  77 yo male presents with fever, difficulty swallowing which causes him to cough. Admitted last week to  for cellulitis of face secondary to eczema. Pt was on vanc and unasyn. and improved. He later completed bactrim course. Pt saw his Dermatologist who told pt he had Shingles. Patient was febrile in the ED. Patient reports progressive dysphagia since discharge.      SUBJECTIVE:   3/17/19    Pt seen and evaluated at bedside today. Denied any complaints when seen. No fevers, chills, chest pain or shortness of breath. Discussed with pt the plan for today. Pt agrees.  ENT recommendations for outpatient follow up.  Also discussed risk of aspiration with patient and currently treating with antibiotics. All questions answered. ENT recommending modified barium swallow however not done on the weekend per nursing.    REVIEW OF SYSTEMS:    CONSTITUTIONAL: No weakness, no fevers , no chills  EYES/ENT: No visual changes;  No vertigo, difficulty swallowing  NECK: No pain or stiffness  RESPIRATORY: No cough, wheezing, hemoptysis; No shortness of breath  CARDIOVASCULAR: No chest pain or palpitations  GASTROINTESTINAL: No abdominal or epigastric pain. No nausea, vomiting, or hematemesis; No diarrhea or constipation. No melena or hematochezia.  GENITOURINARY: No dysuria, frequency or hematuria  NEUROLOGICAL: No numbness or weakness  SKIN: + lesions on face, arms and back , itchy skin   All other review of systems is negative unless indicated above    Vital Signs Last 24 Hrs  T(C): 36.7 (17 Mar 2019 12:19), Max: 36.8 (17 Mar 2019 05:49)  T(F): 98.1 (17 Mar 2019 12:19), Max: 98.3 (17 Mar 2019 05:49)  HR: 81 (17 Mar 2019 12:19) (81 - 98)  BP: 107/55 (17 Mar 2019 12:19) (107/55 - 138/85)  RR: 18 (17 Mar 2019 12:19) (18 - 18)  SpO2: 96% (17 Mar 2019 12:19) (96% - 99%)      PHYSICAL EXAM:    Constitutional: NAD, awake and alert  HEENT: PERR, EOMI,  Neck: Soft and supple, No LAD  Respiratory: Breath sounds are clear bilaterally, No wheezing, rales or rhonchi  Cardiovascular: S1 and S2, regular rate and rhythm, no Murmurs, gallops or rubs  Gastrointestinal: Bowel Sounds present, soft, nontender, nondistended, no guarding, no rebound  Extremities: No peripheral edema  Vascular: 2+ peripheral pulses  Neurological: A/O x 3  Musculoskeletal: 5/5 strength b/l upper and lower extremities  Skin: multiple macular erythematous back lesions, crusted, non painful. Similar lesions on b/l arms. Multiple L facial crusted lesions noted.    MEDICATIONS  (STANDING):  AQUAPHOR (petrolatum Ointment) 1 Application(s) Topical three times a day  dextrose 5% + sodium chloride 0.45%. 1000 milliLiter(s) (75 mL/Hr) IV Continuous <Continuous>  dextrose 5%. 1000 milliLiter(s) (50 mL/Hr) IV Continuous <Continuous>  dextrose 50% Injectable 12.5 Gram(s) IV Push once  dextrose 50% Injectable 25 Gram(s) IV Push once  dextrose 50% Injectable 25 Gram(s) IV Push once  fluconAZOLE   Tablet 100 milliGRAM(s) Oral daily  heparin  Injectable 5000 Unit(s) SubCutaneous every 8 hours  insulin lispro (HumaLOG) corrective regimen sliding scale   SubCutaneous three times a day before meals  insulin lispro (HumaLOG) corrective regimen sliding scale   SubCutaneous at bedtime  nystatin    Suspension 909650 Unit(s) Oral every 6 hours  piperacillin/tazobactam IVPB. 3.375 Gram(s) IV Intermittent every 8 hours    MEDICATIONS  (PRN):  acetaminophen   Tablet .. 650 milliGRAM(s) Oral every 6 hours PRN Temp greater or equal to 38C (100.4F), Mild Pain (1 - 3)  dextrose 40% Gel 15 Gram(s) Oral once PRN Blood Glucose LESS THAN 70 milliGRAM(s)/deciliter  glucagon  Injectable 1 milliGRAM(s) IntraMuscular once PRN Glucose LESS THAN 70 milligrams/deciliter  hydrocortisone 0.5% Cream 1 Application(s) Topical every 6 hours PRN Rash and/or Itching      LABS: All Labs Reviewed:                         13.3   6.57  )-----------( 289      ( 17 Mar 2019 07:50 )             41.2     17 Mar 2019 07:50    140    |  104    |  12     ----------------------------<  122    3.8     |  28     |  1.32     Ca    8.2        17 Mar 2019 07:50        CAPILLARY BLOOD GLUCOSE      POCT Blood Glucose.: 178 mg/dL (17 Mar 2019 11:50)  POCT Blood Glucose.: 115 mg/dL (17 Mar 2019 08:05)  POCT Blood Glucose.: 111 mg/dL (16 Mar 2019 21:56)  POCT Blood Glucose.: 148 mg/dL (16 Mar 2019 17:32)      EXAM:  XR CHEST PORTABLE URGENT 1V                            PROCEDURE DATE:  03/14/2019          INTERPRETATION:  Chest portable.    Clinical History: Fever    Comparison: none    Single AP view submitted.    The evaluation of the cardiomediastinal silhouette is limited on portable   technique.    There is subsegmental atelectasis versus pneumonia in the left lung base.    Impression:    Subsegmental atelectasis versus pneumonia in the left lung base.        EXAM:  XR ESOPHAGUS                            PROCEDURE DATE:  03/15/2019          INTERPRETATION:  Clinical information: Dysphagia    TECHNIQUE: An esophagram was performed utilizing barium and effervescent   granules to distend the esophagus.    COMPARISON: None    FINDINGS: Preliminary radiograph of the chest demonstrates mild patchy   opacity at both lung bases and an azygos fissure.    A moderate-sized Zenker's diverticulum is identified measuring 3.3 cm in   maximal dimension. There was persistent residual contrast within the   diverticulum and after several swallows, matt tracheal aspiration was   noted. The examination was then terminated. Evaluation of the remainder   of the esophagus is limited due to underdistention, however nogross   abnormality is identified.    IMPRESSION:    Matt tracheal aspiration likely due to reflux of contrast from a   moderate-sized Zenker's diverticulum.    Findings discussed with Dr. TON Mcleod on March 15, 2019 3:15 PM.

## 2019-03-18 VITALS
TEMPERATURE: 98 F | OXYGEN SATURATION: 95 % | HEART RATE: 79 BPM | SYSTOLIC BLOOD PRESSURE: 143 MMHG | DIASTOLIC BLOOD PRESSURE: 78 MMHG

## 2019-03-18 LAB
ANION GAP SERPL CALC-SCNC: 5 MMOL/L — SIGNIFICANT CHANGE UP (ref 5–17)
BUN SERPL-MCNC: 13 MG/DL — SIGNIFICANT CHANGE UP (ref 7–23)
CALCIUM SERPL-MCNC: 8.5 MG/DL — SIGNIFICANT CHANGE UP (ref 8.5–10.1)
CHLORIDE SERPL-SCNC: 108 MMOL/L — SIGNIFICANT CHANGE UP (ref 96–108)
CO2 SERPL-SCNC: 26 MMOL/L — SIGNIFICANT CHANGE UP (ref 22–31)
CREAT SERPL-MCNC: 1.45 MG/DL — HIGH (ref 0.5–1.3)
GLUCOSE BLDC GLUCOMTR-MCNC: 113 MG/DL — HIGH (ref 70–99)
GLUCOSE BLDC GLUCOMTR-MCNC: 90 MG/DL — SIGNIFICANT CHANGE UP (ref 70–99)
GLUCOSE SERPL-MCNC: 125 MG/DL — HIGH (ref 70–99)
POTASSIUM SERPL-MCNC: 4 MMOL/L — SIGNIFICANT CHANGE UP (ref 3.5–5.3)
POTASSIUM SERPL-SCNC: 4 MMOL/L — SIGNIFICANT CHANGE UP (ref 3.5–5.3)
SODIUM SERPL-SCNC: 139 MMOL/L — SIGNIFICANT CHANGE UP (ref 135–145)

## 2019-03-18 RX ADMIN — Medication 500000 UNIT(S): at 01:38

## 2019-03-18 RX ADMIN — Medication 500000 UNIT(S): at 11:32

## 2019-03-18 RX ADMIN — PIPERACILLIN AND TAZOBACTAM 25 GRAM(S): 4; .5 INJECTION, POWDER, LYOPHILIZED, FOR SOLUTION INTRAVENOUS at 11:30

## 2019-03-18 RX ADMIN — Medication 1 APPLICATION(S): at 06:34

## 2019-03-18 RX ADMIN — Medication 500000 UNIT(S): at 06:34

## 2019-03-18 RX ADMIN — FLUCONAZOLE 100 MILLIGRAM(S): 150 TABLET ORAL at 11:32

## 2019-03-18 RX ADMIN — PIPERACILLIN AND TAZOBACTAM 25 GRAM(S): 4; .5 INJECTION, POWDER, LYOPHILIZED, FOR SOLUTION INTRAVENOUS at 01:38

## 2019-03-18 NOTE — PROGRESS NOTE ADULT - REASON FOR ADMISSION
complain of malaise and fever

## 2019-03-18 NOTE — PROGRESS NOTE ADULT - ASSESSMENT
79 yo male from 's office BIB wife for malaise, fever, difficulty swallowing which causes him to cough. PMHx is only eczema. Admitted last week to , d/c'ed 4 days ago, for cellulitis of face secondary to eczema. was on vanc and unasyn. improved. d/c'ed on po bactrim. finished the bactrim. He saw Dr Tsai (dermatologist) since discharge and Dr Tsai told pt that he had "shingles" of his face where the cellulitis was that it is now resolving and scabs would eventually flake off. Note to be febrile in ED also. He has difficulty swallowing food for the last few days. He is only able to eat softer food, like ice cream, concern for thrush/candida esophagitis, was given diflucan/vancomycin/cefepime.     1. febrile syndrome. LLL atelectasis vs pna. oropharyngeal thrush. zenkers diverticulum. eczematous dermatitis  - noted with ZD, plan for ENT f/u on dc  - speech/swallow eval appreciated  - s/p cefepime & zosyn #5   - s/p 5 day course abx dc   - continue with topical ointments   - on diflucan 100mg daily/nystatin swish/swallow for thrush complete 10-14 days  - speech/swallow eval noted  - xray (-), RVP (-) no diarrhea  - supportive care  - f/u cbc    2. other issues - care per medicine

## 2019-03-18 NOTE — PROGRESS NOTE ADULT - ASSESSMENT
79 yo male PMH of eczema presented with fever and malaise.    #Sepsis 2/2 Aspiration PNA  ~febrile and tachycardia in ED   -CXR - atelectasis vs. PNA L lung base   -s/p Zosyn q8h  -ID consulted    #Dysphagia 2/2 Candida Esophagitis  -Zenker Diverticulum- 3.3 cm in maximal dimension  -S/W ENT at [facility] see chart note, no surgical intervention indicated at this time; ENT refusing consult at this time  -s/p XR esophagus- Adithya tracheal aspiration likely due to reflux of contrast from a moderate-sized Zenker's diverticulum.  -Speech and swallow consult- Dysphagia 3 diet ordered   - Aspiration Precautions  -Consider modified barium swallow per ENT -ordered  -GI consult- S/w Shani - recommending ENT consult , barium esophagram, empiric Fluconazole   -Continue Fluconazole 200 mg PO QD      #DM2  -6.8  -ISS  -Monitor Finger sticks    #Eczema  -Continue Aquaphor  -Topical steroids

## 2019-03-18 NOTE — PROGRESS NOTE ADULT - SUBJECTIVE AND OBJECTIVE BOX
Pt has been seen and examined with FP resident, resident supervised agree with a/p       Patient is a 78y old  Male who presents with a chief complaint of complain of malaise and fever (16 Mar 2019 17:39)      PHYSICAL EXAM:  Vital Signs Last 24 Hrs  T(C): 36.7 (18 Mar 2019 11:52), Max: 37.1 (17 Mar 2019 17:19)  T(F): 98.1 (18 Mar 2019 11:52), Max: 98.7 (17 Mar 2019 17:19)  HR: 79 (18 Mar 2019 11:52) (64 - 79)  BP: 143/78 (18 Mar 2019 11:52) (125/57 - 143/78)  BP(mean): --  RR: 18 (18 Mar 2019 05:16) (17 - 18)  SpO2: 95% (18 Mar 2019 11:52) (95% - 97%)  general- comfortable   -rs-aeeb, crackles present   -cvs-s1s2 normal   -p/a-soft,bs+  -extremity- no asymmetrical swelling noted   -cns- non focal         A/P    #Follow up with ENT in [place] to find out future plan- either D/c home or transfer     #D/C if ok with ENT, time spent 65 minutes     #Discussed with pt and all questions have been answered

## 2019-03-18 NOTE — CHART NOTE - NSCHARTNOTEFT_GEN_A_CORE
Called by nurse, patient signed out AMA prior to being spoken to about risks of leaving the hospital prior to discharge.

## 2019-03-18 NOTE — PROGRESS NOTE ADULT - SUBJECTIVE AND OBJECTIVE BOX
Date of service: 03-18-19 @ 13:17    pt seen and examined  feels better  no cough or resp issues  less epigastric pain/cp  s/p barium esophogram    ROS: no fever or chills; denies dizziness, no HA, no SOB or cough, no abdominal pain, no diarrhea or constipation; no dysuria, no urinary frequency, no legs pain    MEDICATIONS  (STANDING):  AQUAPHOR (petrolatum Ointment) 1 Application(s) Topical three times a day  dextrose 5% + sodium chloride 0.45%. 1000 milliLiter(s) (75 mL/Hr) IV Continuous <Continuous>  dextrose 5%. 1000 milliLiter(s) (50 mL/Hr) IV Continuous <Continuous>  dextrose 50% Injectable 12.5 Gram(s) IV Push once  dextrose 50% Injectable 25 Gram(s) IV Push once  dextrose 50% Injectable 25 Gram(s) IV Push once  fluconAZOLE   Tablet 100 milliGRAM(s) Oral daily  heparin  Injectable 5000 Unit(s) SubCutaneous every 8 hours  insulin lispro (HumaLOG) corrective regimen sliding scale   SubCutaneous three times a day before meals  insulin lispro (HumaLOG) corrective regimen sliding scale   SubCutaneous at bedtime  nystatin    Suspension 075535 Unit(s) Oral every 6 hours      Vital Signs Last 24 Hrs  T(C): 36.7 (18 Mar 2019 11:52), Max: 37.1 (17 Mar 2019 17:19)  T(F): 98.1 (18 Mar 2019 11:52), Max: 98.7 (17 Mar 2019 17:19)  HR: 79 (18 Mar 2019 11:52) (64 - 79)  BP: 143/78 (18 Mar 2019 11:52) (125/57 - 143/78)  BP(mean): --  RR: 18 (18 Mar 2019 05:16) (17 - 18)  SpO2: 95% (18 Mar 2019 11:52) (95% - 97%)      PE:  Constitutional: frail looking  HEENT: NC/AT, EOMI, PERRLA, conjunctivae clear; ears and nose atraumatic; pharynx benign  Neck: supple; thyroid not palpable  Back: no tenderness  Respiratory: respiratory effort normal; clear to auscultation  Cardiovascular: S1S2 regular, no murmurs  Abdomen: soft, not tender, not distended, positive BS; liver and spleen WNL  Genitourinary: no suprapubic tenderness  Lymphatic: no LN palpable  Musculoskeletal: no muscle tenderness, no joint swelling or tenderness  Extremities: no pedal edema  Neurological/ Psychiatric: AxOx3, Judgement and insight normal;  moving all extremities  Skin: L facial skin sloughing/flaky lesions, no erythema, no warmth, various cutaneous maculopapular lesions along extremities    Labs: all available labs reviewed                                   13.3   6.57  )-----------( 289      ( 17 Mar 2019 07:50 )             41.2     03-18    139  |  108  |  13  ----------------------------<  125<H>  4.0   |  26  |  1.45<H>    Ca    8.5      18 Mar 2019 08:00      Culture - Urine (03.14.19 @ 18:27)    Specimen Source: .Urine Clean Catch (Midstream)    Culture Results:   No growth    Culture - Blood (03.14.19 @ 14:49)    Specimen Source: .Blood None    Culture Results:   No growth to date.    Culture - Blood (03.14.19 @ 14:49)    Specimen Source: .Blood None    Culture Results:   No growth to date.          Radiology:    EXAM:  XR CHEST PORTABLE URGENT 1V                            PROCEDURE DATE:  03/14/2019          INTERPRETATION:  Chest portable.    Clinical History: Fever    Comparison: none    Single AP view submitted.    The evaluation of the cardiomediastinal silhouette is limited on portable   technique.    There is subsegmental atelectasis versus pneumonia in the left lung base.    Impression:    Subsegmental atelectasis versus pneumonia in the left lung base.    Advanced directives addressed: full resuscitation

## 2019-03-20 ENCOUNTER — APPOINTMENT (OUTPATIENT)
Age: 79
End: 2019-03-20
Payer: MEDICARE

## 2019-03-20 DIAGNOSIS — B02.9 ZOSTER W/OUT COMPLICATIONS: ICD-10-CM

## 2019-03-20 PROCEDURE — 99214 OFFICE O/P EST MOD 30 MIN: CPT

## 2019-03-20 NOTE — ASSESSMENT
[FreeTextEntry1] : Asteatotic eczema of body:  symptoms well controlled with topical fluticasone;  continue ointment, will improve in warmer weather;\par \par L scalp consistent with resolving zoster, ++ buildup of scale due to neuralgia of affected area, reluctance to cleanse effectively due to pain;\par discussed baths, with soaks to remove scale gently\par recommend starting gabapentin;  400 mg TID, double dose HS:\par has f/u appointment in 2 weeks\par no activity restrictions\par

## 2019-03-20 NOTE — PHYSICAL EXAM
[FreeTextEntry3] : Erythematous scaling patches with inflammation - over shoulders, arms, back, chest;\par \par thick crusts;  L side of scalp, ear, neck

## 2019-03-21 DIAGNOSIS — L30.9 DERMATITIS, UNSPECIFIED: ICD-10-CM

## 2019-03-21 DIAGNOSIS — Z53.21 PROCEDURE AND TREATMENT NOT CARRIED OUT DUE TO PATIENT LEAVING PRIOR TO BEING SEEN BY HEALTH CARE PROVIDER: ICD-10-CM

## 2019-03-21 DIAGNOSIS — J69.0 PNEUMONITIS DUE TO INHALATION OF FOOD AND VOMIT: ICD-10-CM

## 2019-03-21 DIAGNOSIS — E11.9 TYPE 2 DIABETES MELLITUS WITHOUT COMPLICATIONS: ICD-10-CM

## 2019-03-21 DIAGNOSIS — Z88.8 ALLERGY STATUS TO OTHER DRUGS, MEDICAMENTS AND BIOLOGICAL SUBSTANCES STATUS: ICD-10-CM

## 2019-03-21 DIAGNOSIS — A41.9 SEPSIS, UNSPECIFIED ORGANISM: ICD-10-CM

## 2019-03-21 DIAGNOSIS — K22.5 DIVERTICULUM OF ESOPHAGUS, ACQUIRED: ICD-10-CM

## 2019-03-21 DIAGNOSIS — B37.81 CANDIDAL ESOPHAGITIS: ICD-10-CM

## 2019-03-21 DIAGNOSIS — R50.9 FEVER, UNSPECIFIED: ICD-10-CM

## 2019-03-25 ENCOUNTER — APPOINTMENT (OUTPATIENT)
Dept: OTOLARYNGOLOGY | Facility: CLINIC | Age: 79
End: 2019-03-25
Payer: MEDICARE

## 2019-03-25 VITALS
BODY MASS INDEX: 32.93 KG/M2 | HEART RATE: 75 BPM | DIASTOLIC BLOOD PRESSURE: 90 MMHG | SYSTOLIC BLOOD PRESSURE: 148 MMHG | WEIGHT: 230 LBS | HEIGHT: 70 IN

## 2019-03-25 DIAGNOSIS — Z87.891 PERSONAL HISTORY OF NICOTINE DEPENDENCE: ICD-10-CM

## 2019-03-25 PROCEDURE — 99204 OFFICE O/P NEW MOD 45 MIN: CPT | Mod: 25

## 2019-03-25 PROCEDURE — 31575 DIAGNOSTIC LARYNGOSCOPY: CPT

## 2019-03-25 RX ORDER — DOXYCYCLINE HYCLATE 100 MG/1
100 TABLET ORAL TWICE DAILY
Qty: 20 | Refills: 0 | Status: COMPLETED | COMMUNITY
Start: 2019-02-28 | End: 2019-03-25

## 2019-03-25 RX ORDER — GABAPENTIN 400 MG/1
400 CAPSULE ORAL
Qty: 120 | Refills: 2 | Status: COMPLETED | COMMUNITY
Start: 2019-03-20 | End: 2019-03-25

## 2019-03-25 NOTE — DATA REVIEWED
[de-identified] : Esophagram with 3.3 cm Zenker's diverticulum with matt tracheal aspiration of retained contrast.

## 2019-03-25 NOTE — CONSULT LETTER
[Dear  ___] : Dear  [unfilled], [Consult Letter:] : I had the pleasure of evaluating your patient, [unfilled]. [Please see my note below.] : Please see my note below. [Consult Closing:] : Thank you very much for allowing me to participate in the care of this patient.  If you have any questions, please do not hesitate to contact me. [Sincerely,] : Sincerely, [FreeTextEntry2] : Dr. Gustavo Padron\par [address], \[place] [address] [FreeTextEntry3] : Dev

## 2019-03-25 NOTE — HISTORY OF PRESENT ILLNESS
Ok to refill metoprolol 100mg .  rx ready to be faxed.   [de-identified] : Referred by Dr. 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  Pt is experiencing dysphagia.  Pt just got out of [facility] a few days ago due to having Shingles.  He denies any signs of aspiration, dyspnea, otalgia or weight loss.

## 2019-03-25 NOTE — PROCEDURE
[Dysphagia] : dysphagia not clearly evaluated by indirect laryngoscopy [None] : none [Flexible Endoscope] : examined with the flexible endoscope [Serial Number: ___] : Serial Number: [unfilled] [de-identified] : No lesions in the NPx, OPx, HPx or larynx.  VC are mobile, airway patent.  No significant pooling of secretions, no obvious debris.

## 2019-04-08 ENCOUNTER — APPOINTMENT (OUTPATIENT)
Dept: DERMATOLOGY | Facility: CLINIC | Age: 79
End: 2019-04-08
Payer: MEDICARE

## 2019-04-08 DIAGNOSIS — B02.8 ZOSTER WITH OTHER COMPLICATIONS: ICD-10-CM

## 2019-04-08 PROCEDURE — 99213 OFFICE O/P EST LOW 20 MIN: CPT | Mod: 25

## 2019-04-08 PROCEDURE — 11104 PUNCH BX SKIN SINGLE LESION: CPT

## 2019-04-08 NOTE — ASSESSMENT
[FreeTextEntry1] : Post-herpetic neuralgia\par Discussed at great length.\par Advil prn, as patient declines gabapentin.\par \par Eczema / pruritus.  R/O drug.  doubt CTCL or Lues.\par Cutivate cream - continue for now.

## 2019-04-08 NOTE — PHYSICAL EXAM
[Alert] : alert [Oriented x 3] : ~L oriented x 3 [Well Nourished] : well nourished [Declined] : declined [Eyelids] : Eyelids [Ears] : Ears [Lips] : Lips [FreeTextEntry3] : Left face and neck ok.\par \par Erythematous papules, some smooth, and some with keratotic surface, diffusely and prominent of the back, chest, proximal UE's.

## 2019-04-08 NOTE — HISTORY OF PRESENT ILLNESS
[FreeTextEntry1] : Diffuse rash and post-herpetic neuralgia. [de-identified] : Patient notes pain s/p zoster is still present.  It is mild, and he chose not to take the gabapentin.  Left neck and cheek region.\par Also with diffuse rash on the trunk, progressing.  Pruritic.  No bleeding, no tenderness.  Partial response to cutivate cream.

## 2019-04-15 ENCOUNTER — APPOINTMENT (OUTPATIENT)
Dept: DERMATOLOGY | Facility: CLINIC | Age: 79
End: 2019-04-15
Payer: MEDICARE

## 2019-04-15 PROCEDURE — 99212 OFFICE O/P EST SF 10 MIN: CPT

## 2019-04-15 NOTE — HISTORY OF PRESENT ILLNESS
[FreeTextEntry1] : Suture removal. [de-identified] : Right upper arm, well healed, without evidence of infection.  Sutures removed.

## 2019-04-15 NOTE — ASSESSMENT
[FreeTextEntry1] : Diffuse erythematous papules - trunk and UE's.\par R/O LE.\par Check labs.\par Path still pending.\par Will call patient when path and labs available.

## 2019-04-22 ENCOUNTER — OUTPATIENT (OUTPATIENT)
Dept: OUTPATIENT SERVICES | Facility: HOSPITAL | Age: 79
LOS: 1 days | End: 2019-04-22
Payer: MEDICARE

## 2019-04-22 VITALS
HEART RATE: 87 BPM | DIASTOLIC BLOOD PRESSURE: 80 MMHG | HEIGHT: 69 IN | RESPIRATION RATE: 16 BRPM | TEMPERATURE: 98 F | SYSTOLIC BLOOD PRESSURE: 142 MMHG | WEIGHT: 225.97 LBS | OXYGEN SATURATION: 96 %

## 2019-04-22 DIAGNOSIS — K22.5 DIVERTICULUM OF ESOPHAGUS, ACQUIRED: ICD-10-CM

## 2019-04-22 LAB
ALBUMIN SERPL ELPH-MCNC: 4 G/DL — SIGNIFICANT CHANGE UP (ref 3.3–5)
ALBUMIN SERPL ELPH-MCNC: 4.1 G/DL
ALP BLD-CCNC: 57 U/L
ALP SERPL-CCNC: 58 U/L — SIGNIFICANT CHANGE UP (ref 40–120)
ALT FLD-CCNC: 22 U/L — SIGNIFICANT CHANGE UP (ref 4–41)
ALT SERPL-CCNC: 23 U/L
ANA PAT FLD IF-IMP: ABNORMAL
ANA SER IF-ACNC: ABNORMAL
ANION GAP SERPL CALC-SCNC: 12 MMOL/L
ANION GAP SERPL CALC-SCNC: 13 MMO/L — SIGNIFICANT CHANGE UP (ref 7–14)
AST SERPL-CCNC: 21 U/L
AST SERPL-CCNC: 23 U/L — SIGNIFICANT CHANGE UP (ref 4–40)
BASOPHILS # BLD AUTO: 0.04 K/UL
BASOPHILS NFR BLD AUTO: 0.5 %
BILIRUB SERPL-MCNC: 0.6 MG/DL — SIGNIFICANT CHANGE UP (ref 0.2–1.2)
BILIRUB SERPL-MCNC: 0.7 MG/DL
BLD GP AB SCN SERPL QL: NEGATIVE — SIGNIFICANT CHANGE UP
BUN SERPL-MCNC: 15 MG/DL — SIGNIFICANT CHANGE UP (ref 7–23)
BUN SERPL-MCNC: 23 MG/DL
CALCIUM SERPL-MCNC: 9.3 MG/DL
CALCIUM SERPL-MCNC: 9.6 MG/DL — SIGNIFICANT CHANGE UP (ref 8.4–10.5)
CHLORIDE SERPL-SCNC: 105 MMOL/L
CHLORIDE SERPL-SCNC: 99 MMOL/L — SIGNIFICANT CHANGE UP (ref 98–107)
CO2 SERPL-SCNC: 27 MMOL/L
CO2 SERPL-SCNC: 28 MMOL/L — SIGNIFICANT CHANGE UP (ref 22–31)
CORE LAB BIOPSY: NORMAL
CREAT SERPL-MCNC: 1.06 MG/DL — SIGNIFICANT CHANGE UP (ref 0.5–1.3)
CREAT SERPL-MCNC: 1.17 MG/DL
EOSINOPHIL # BLD AUTO: 0.8 K/UL
EOSINOPHIL NFR BLD AUTO: 10.3 %
ERYTHROCYTE [SEDIMENTATION RATE] IN BLOOD BY WESTERGREN METHOD: 22 MM/HR
GLUCOSE SERPL-MCNC: 108 MG/DL — HIGH (ref 70–99)
GLUCOSE SERPL-MCNC: 128 MG/DL
HBA1C BLD-MCNC: 6.6 % — HIGH (ref 4–5.6)
HCT VFR BLD CALC: 46.6 %
HCT VFR BLD CALC: 46.6 % — SIGNIFICANT CHANGE UP (ref 39–50)
HGB BLD-MCNC: 14.6 G/DL
HGB BLD-MCNC: 14.7 G/DL — SIGNIFICANT CHANGE UP (ref 13–17)
IMM GRANULOCYTES NFR BLD AUTO: 0.3 %
LYMPHOCYTES # BLD AUTO: 1.31 K/UL
LYMPHOCYTES NFR BLD AUTO: 16.9 %
MAN DIFF?: NORMAL
MCHC RBC-ENTMCNC: 30.5 PG — SIGNIFICANT CHANGE UP (ref 27–34)
MCHC RBC-ENTMCNC: 30.7 PG
MCHC RBC-ENTMCNC: 31.3 GM/DL
MCHC RBC-ENTMCNC: 31.5 % — LOW (ref 32–36)
MCV RBC AUTO: 96.7 FL — SIGNIFICANT CHANGE UP (ref 80–100)
MCV RBC AUTO: 97.9 FL
MONOCYTES # BLD AUTO: 0.76 K/UL
MONOCYTES NFR BLD AUTO: 9.8 %
NEUTROPHILS # BLD AUTO: 4.82 K/UL
NEUTROPHILS NFR BLD AUTO: 62.2 %
NRBC # FLD: 0 K/UL — SIGNIFICANT CHANGE UP (ref 0–0)
PLATELET # BLD AUTO: 221 K/UL
PLATELET # BLD AUTO: 238 K/UL — SIGNIFICANT CHANGE UP (ref 150–400)
PMV BLD: 10.4 FL — SIGNIFICANT CHANGE UP (ref 7–13)
POTASSIUM SERPL-MCNC: 4.2 MMOL/L — SIGNIFICANT CHANGE UP (ref 3.5–5.3)
POTASSIUM SERPL-SCNC: 4.2 MMOL/L — SIGNIFICANT CHANGE UP (ref 3.5–5.3)
POTASSIUM SERPL-SCNC: 4.8 MMOL/L
PROT SERPL-MCNC: 6.6 G/DL
PROT SERPL-MCNC: 7 G/DL — SIGNIFICANT CHANGE UP (ref 6–8.3)
RBC # BLD: 4.76 M/UL
RBC # BLD: 4.82 M/UL — SIGNIFICANT CHANGE UP (ref 4.2–5.8)
RBC # FLD: 14.4 % — SIGNIFICANT CHANGE UP (ref 10.3–14.5)
RBC # FLD: 14.6 %
RH IG SCN BLD-IMP: POSITIVE — SIGNIFICANT CHANGE UP
SODIUM SERPL-SCNC: 140 MMOL/L — SIGNIFICANT CHANGE UP (ref 135–145)
SODIUM SERPL-SCNC: 144 MMOL/L
WBC # BLD: 7.8 K/UL — SIGNIFICANT CHANGE UP (ref 3.8–10.5)
WBC # FLD AUTO: 7.75 K/UL
WBC # FLD AUTO: 7.8 K/UL — SIGNIFICANT CHANGE UP (ref 3.8–10.5)

## 2019-04-22 PROCEDURE — 93010 ELECTROCARDIOGRAM REPORT: CPT

## 2019-04-22 NOTE — H&P PST ADULT - NEGATIVE CARDIOVASCULAR SYMPTOMS
no chest pain/no palpitations/no peripheral edema/no dyspnea on exertion/no orthopnea/no claudication/no paroxysmal nocturnal dyspnea

## 2019-04-22 NOTE — H&P PST ADULT - NEGATIVE RESPIRATORY AND THORAX SYMPTOMS
no hemoptysis/no pleuritic chest pain/no dyspnea/no cough/no wheezing no hemoptysis/no pleuritic chest pain/no wheezing/no dyspnea

## 2019-04-22 NOTE — H&P PST ADULT - NSICDXPROBLEM_GEN_ALL_CORE_FT
PROBLEM DIAGNOSES  Problem: Diverticulum of esophagus, acquired  Assessment and Plan: Pt. evaluated for a scheduled direct laryngoscopy esophagoscopy, endoscopic Zenker's diverticulectomy, possible cervical approach on 4/25/19.  Preop instructions provided, npo status, Pepcid and Hibiclens if rash noted to be improving and apply to neck. verbalized understanding. MC pending, form provided. states may see dermatologist as well for bx results. Form provided for instructions and dx on rash. C/W prn Flovent and Aquaphor. PROBLEM DIAGNOSES  Problem: Diverticulum of esophagus, acquired  Assessment and Plan: Scheduled for direct laryngoscopy, esophagoscopy, endoscopic Zenker's diverticulectomy, possible cervical approach on 06/20/19. Pre op instructions, famotidine , chlorhexidine gluconate soap given and explained. Pt verbalized understanding with return demonstration  Pending cardiology evaluation as per surgeon's request

## 2019-04-22 NOTE — H&P PST ADULT - GIT GEN HX ROS MEA POS PC
hematochezia/from hemorrhoids as per pt and is occasional and mainly if eat nuts "occasionally a drop of blood when I wipe myself because of hemorrhoids"

## 2019-04-22 NOTE — H&P PST ADULT - ENDOCRINE COMMENTS
states thyroid hx, no meds. endo seen, nodules to thyroid noted, radiated iodine done and resolved issues. states TSH wnl last done 6 mos ago. PreDM no meds stated. A1c 6.2. states thyroid hx, no meds. endo seen, nodules to thyroid noted, radiated iodine done and resolved issues.

## 2019-04-22 NOTE — H&P PST ADULT - MS GEN HX ROS MEA POS PC
arthritis/leg pain R/leg pain L/OA of knees stated but ok/neck pain joint pain/arthritis/OA of knees/leg pain R/stiffness/neck pain/leg pain L

## 2019-04-22 NOTE — H&P PST ADULT - NEGATIVE GASTROINTESTINAL SYMPTOMS
no melena/no jaundice/no vomiting/no change in bowel habits/no flatulence/no diarrhea/no steatorrhea/no hiccoughs/no constipation/no nausea/no abdominal pain no abdominal pain/no steatorrhea/no diarrhea/no vomiting/no constipation/no nausea/no hiccoughs/no change in bowel habits/no melena/no jaundice

## 2019-04-22 NOTE — H&P PST ADULT - NSICDXPASTMEDICALHX_GEN_ALL_CORE_FT
PAST MEDICAL HISTORY:  Chronic obstructive asthma     Eczema     Prostate CA no intervention PAST MEDICAL HISTORY:  Chronic obstructive asthma     Eczema     Prostate CA no intervention    Shingles 03/2019

## 2019-04-22 NOTE — H&P PST ADULT - NEGATIVE BREAST SYMPTOMS
no breast lump L/no breast tenderness L/no breast tenderness R/no nipple discharge R/no breast lump R/no nipple discharge L

## 2019-04-22 NOTE — H&P PST ADULT - RS GEN PE MLT RESP DETAILS PC
no rhonchi/no wheezes/airway patent/normal/breath sounds equal/no rales/good air movement/clear to auscultation bilaterally/respirations non-labored/no chest wall tenderness/no intercostal retractions

## 2019-04-22 NOTE — H&P PST ADULT - NEGATIVE ENDOCRINE SYMPTOMS
no heat intolerance/no cold intolerance no voice change/no cold intolerance/no heat intolerance/no striae/no hirsutism

## 2019-04-22 NOTE — H&P PST ADULT - HISTORY OF PRESENT ILLNESS
79 y/o male w/ PMH of Prostate ca, shingles 1 mo ago, Asthma (deniers intubations or hospitalizations and dermatitis. Presents to PST w/ a preop dx of   Pt. states for 1 year noted a clog in throat, difficulty swallowing at times. 1 mo ago went to [facility] due to shingles episode, as part of all of the work up done was recommend to f/u w/ pcp since a swallow test done and Zenker's diverticulum noted. Pt referred to ENT, Dr Moya evaluated him and 77 y/o male w/ PMH of Prostate ca, shingles 1 mo ago, Asthma (deniers intubations or hospitalizations and dermatitis. Presents to PST w/ a preop dx of Diverticulum of esophagus, acquired and to be evaluated for a scheduled direct laryngoscopy esophagoscopy, endoscopic Zenker's diverticulectomy, possible cervical approach on 4/25/19.   Pt. states for 1 year noted a clog in throat, difficulty swallowing at times. 1 mo ago went to [facility] [place] due to shingles episode, as part of all of the work up done was recommend to f/u w/ pcp since a swallow test done and Zenker's diverticulum noted. Pt referred to ENT, Dr Moya evaluated him and 77 y/o  male w/ PMH of Prostate ca, Shingles (03/2019), Asthma (deniers intubations or hospitalizations and dermatitis  . Presents to PST w/ a preop dx of Diverticulum of esophagus, acquired and to be evaluated for a scheduled direct laryngoscopy esophagoscopy, endoscopic Zenker's diverticulectomy, possible cervical approach on 4/25/19. 77 y/o  male w/ PMH of Prostate ca, Shingles (03/2019), Asthma (denies intubations or hospitalizations ) presents to PST for preop evaluation with pre op dx of Diverticulum of esophagus, acquired. Now scheduled for Direct laryngoscopy, esophagoscopy, endoscopic Zenker's diverticulectomy , possible cervical approach on 06/20/19. Pt was initially scheduled for same on 04/25/19 as per pt surgery was cancelled due to abnormal EKG.

## 2019-04-22 NOTE — H&P PST ADULT - PAPULES LOCATION
hand L/leg R/Dermatosis noted all over the body/chest/back/arm L/arm R/hand R/groin L/groin R/leg L/abdomen

## 2019-04-22 NOTE — H&P PST ADULT - ASSESSMENT
DX: Diverticulum of esophagus, acquired and to be evaluated for a scheduled direct laryngoscopy esophagoscopy, endoscopic Zenker's diverticulectomy, possible cervical approach on 4/25/19. DX: Diverticulum of esophagus, acquired and evaluated for a scheduled direct laryngoscopy esophagoscopy, endoscopic Zenker's diverticulectomy, possible cervical approach on 4/25/19. 77 y/o male presents with pre op diagnosis: Diverticulum of esophagus, acquired

## 2019-04-22 NOTE — H&P PST ADULT - NEGATIVE GENERAL GENITOURINARY SYMPTOMS
no nocturia/no renal colic/no urine discoloration/no hematuria/no flank pain R/no flank pain L/no incontinence/normal urinary frequency/no bladder infections/no dysuria/no urinary hesitancy no flank pain R/no hematuria/no bladder infections/no dysuria/no flank pain L/no renal colic/no incontinence/no urine discoloration

## 2019-04-22 NOTE — H&P PST ADULT - NEGATIVE ENMT SYMPTOMS
no tinnitus/no nasal discharge/no ear pain/no recurrent cold sores/no throat pain/no sinus symptoms/no post-nasal discharge/no abnormal taste sensation/no hearing difficulty/no vertigo/no nasal congestion/no nasal obstruction/no nose bleeds

## 2019-04-22 NOTE — H&P PST ADULT - NSANTHOSAYNRD_GEN_A_CORE
Never smoker
never tested/No. LOIS screening performed.  STOP BANG Legend: 0-2 = LOW Risk; 3-4 = INTERMEDIATE Risk; 5-8 = HIGH Risk

## 2019-04-25 ENCOUNTER — APPOINTMENT (OUTPATIENT)
Dept: OTOLARYNGOLOGY | Facility: HOSPITAL | Age: 79
End: 2019-04-25

## 2019-05-01 ENCOUNTER — APPOINTMENT (OUTPATIENT)
Dept: DERMATOLOGY | Facility: CLINIC | Age: 79
End: 2019-05-01
Payer: MEDICARE

## 2019-05-01 PROCEDURE — 99213 OFFICE O/P EST LOW 20 MIN: CPT | Mod: 25

## 2019-05-01 PROCEDURE — 11105 PUNCH BX SKIN EA SEP/ADDL: CPT

## 2019-05-01 PROCEDURE — 11104 PUNCH BX SKIN SINGLE LESION: CPT

## 2019-05-01 NOTE — HISTORY OF PRESENT ILLNESS
[FreeTextEntry1] : Persistent rash. [de-identified] : Markedly pruritic, affecting his quality of life currently.  Topicals have been ineffective, and patient notes it has been increasing in intensity over the past weeks.  Began approx. 3 months ago.  Generalized.

## 2019-05-01 NOTE — PHYSICAL EXAM
[Alert] : alert [Oriented x 3] : ~L oriented x 3 [Well Nourished] : well nourished [FreeTextEntry3] : A skin exam was performed from the belt up including the scalp, face (including the lips, ears, nose and eyes), neck, chest, abdomen, back and upper extremities.  The patient declined examination below the belt. \par \par erythematous patches, some with induration, but minimal scale - extensive on the anterior and posterior trunk, with UE involvment, as well as neck, and mild on the face.

## 2019-05-01 NOTE — ASSESSMENT
[FreeTextEntry1] : Eczema, r/o LE\par Prednisone 40mg qd x 1 week, 30mg x 1 week, 20mg x 1 week, 10 mg x 1 week.\par Emollients.\par Will consider dupixent if systemic long term tx is needed.

## 2019-05-03 ENCOUNTER — NON-APPOINTMENT (OUTPATIENT)
Age: 79
End: 2019-05-03

## 2019-05-03 ENCOUNTER — APPOINTMENT (OUTPATIENT)
Dept: CARDIOLOGY | Facility: CLINIC | Age: 79
End: 2019-05-03
Payer: MEDICARE

## 2019-05-03 VITALS — SYSTOLIC BLOOD PRESSURE: 140 MMHG | DIASTOLIC BLOOD PRESSURE: 80 MMHG

## 2019-05-03 VITALS
HEIGHT: 70 IN | OXYGEN SATURATION: 98 % | WEIGHT: 230 LBS | HEART RATE: 62 BPM | TEMPERATURE: 97.8 F | RESPIRATION RATE: 16 BRPM | SYSTOLIC BLOOD PRESSURE: 152 MMHG | BODY MASS INDEX: 32.93 KG/M2 | DIASTOLIC BLOOD PRESSURE: 84 MMHG

## 2019-05-03 DIAGNOSIS — Z86.19 PERSONAL HISTORY OF OTHER INFECTIOUS AND PARASITIC DISEASES: ICD-10-CM

## 2019-05-03 PROCEDURE — 99204 OFFICE O/P NEW MOD 45 MIN: CPT | Mod: 25

## 2019-05-03 PROCEDURE — 93000 ELECTROCARDIOGRAM COMPLETE: CPT

## 2019-05-03 NOTE — HISTORY OF PRESENT ILLNESS
[FreeTextEntry1] : 78 year old male without significant cardiac history , chronic  eczema ,who came for pre op evaluation for zenkars diverticular surgery , Patient was noted to have abnormal EKG warranted cardiology evaluation . Patient denies any prior cardiac history , patient was suffering with  shingles , with severe pain , with fever , during that time ekg showed sinus tachycardia 133 with RBBB , which is not present prior ekg and follow up ekg when he was not in pain or having fever . \par \par Patient denies any exertional chest pain or shortness of breath , patient does have high power ball room \par 5 night  a week for many years , no change in recent exercise capacity

## 2019-05-03 NOTE — ASSESSMENT
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    patient had no exertional symptoms \par \par Mild hypertension as he is on steroid for eczema , would recommend low salt diet , follow up echo , home BP monitoring .

## 2019-05-03 NOTE — PHYSICAL EXAM
[General Appearance - Well Developed] : well developed [Normal Conjunctiva] : the conjunctiva exhibited no abnormalities [Normal Oral Mucosa] : normal oral mucosa [Normal Jugular Venous A Waves Present] : normal jugular venous A waves present [Heart Sounds] : normal S1 and S2 [Heart Rate And Rhythm] : heart rate and rhythm were normal [Arterial Pulses Normal] : the arterial pulses were normal [Murmurs] : no murmurs present [Edema] : no peripheral edema present [Respiration, Rhythm And Depth] : normal respiratory rhythm and effort [] : no respiratory distress [Auscultation Breath Sounds / Voice Sounds] : lungs were clear to auscultation bilaterally [Exaggerated Use Of Accessory Muscles For Inspiration] : no accessory muscle use [Chest Palpation] : palpation of the chest revealed no abnormalities [Lungs Percussion] : the lungs were normal to percussion [Bowel Sounds] : normal bowel sounds [Abdomen Tenderness] : non-tender [Abdomen Soft] : soft [Abnormal Walk] : normal gait [Nail Clubbing] : no clubbing of the fingernails [Skin Color & Pigmentation] : normal skin color and pigmentation [Oriented To Time, Place, And Person] : oriented to person, place, and time [Macular Rash] : A macular rash was noted [Trunk] : on the trunk [FreeTextEntry1] : eczema

## 2019-05-03 NOTE — REVIEW OF SYSTEMS
[Blurry Vision] : no blurred vision [Fever] : no fever [Shortness Of Breath] : no shortness of breath [Chest Pain] : no chest pain [Earache] : no earache [Cough] : no cough [Palpitations] : no palpitations [Abdominal Pain] : no abdominal pain [Joint Pain] : no joint pain [Impotence] : no impotence [Urinary Frequency] : no change in urinary frequency [Dysphagia] : no dysphagia [Dizziness] : no dizziness [Skin: A Rash] : no rash: [Skin Lesions] : no skin lesions [Confusion] : no confusion was observed [Excessive Thirst] : no polydipsia [Easy Bleeding] : no tendency for easy bleeding

## 2019-05-03 NOTE — REASON FOR VISIT
[Consultation] : a consultation regarding [Abnormal ECG] : an abnormal ECG [FreeTextEntry1] : pre op evaluation s

## 2019-05-07 LAB — CORE LAB BIOPSY: NORMAL

## 2019-05-13 ENCOUNTER — APPOINTMENT (OUTPATIENT)
Dept: DERMATOLOGY | Facility: CLINIC | Age: 79
End: 2019-05-13
Payer: MEDICARE

## 2019-05-13 PROCEDURE — 99213 OFFICE O/P EST LOW 20 MIN: CPT

## 2019-05-13 NOTE — HISTORY OF PRESENT ILLNESS
[FreeTextEntry1] : Suture removal. [de-identified] : Right shoulder is well healed, without evidence of infection.  Sutures removed.\par \par Still with dermatitis on the chest, UE's and back, although patient reports pruritus is much improved on prednisone.

## 2019-05-13 NOTE — ASSESSMENT
[FreeTextEntry1] : Eczema vs. 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

## 2019-05-13 NOTE — PHYSICAL EXAM
[FreeTextEntry3] : eczematous papules, patches - shoulders, back, chest, upper abdomen and UE's.\par \par

## 2019-05-16 ENCOUNTER — APPOINTMENT (OUTPATIENT)
Dept: CARDIOLOGY | Facility: CLINIC | Age: 79
End: 2019-05-16
Payer: MEDICARE

## 2019-05-16 PROCEDURE — 93306 TTE W/DOPPLER COMPLETE: CPT

## 2019-05-22 ENCOUNTER — APPOINTMENT (OUTPATIENT)
Dept: UROLOGY | Facility: CLINIC | Age: 79
End: 2019-05-22
Payer: MEDICARE

## 2019-05-22 PROCEDURE — 99214 OFFICE O/P EST MOD 30 MIN: CPT

## 2019-05-23 LAB
PSA FREE FLD-MCNC: 4 %
PSA FREE SERPL-MCNC: 4.71 NG/ML
PSA SERPL-MCNC: 113 NG/ML

## 2019-05-28 ENCOUNTER — APPOINTMENT (OUTPATIENT)
Dept: CARDIOLOGY | Facility: CLINIC | Age: 79
End: 2019-05-28
Payer: MEDICARE

## 2019-05-28 PROCEDURE — 78452 HT MUSCLE IMAGE SPECT MULT: CPT

## 2019-05-28 PROCEDURE — A9500: CPT

## 2019-05-28 PROCEDURE — 93015 CV STRESS TEST SUPVJ I&R: CPT

## 2019-06-03 ENCOUNTER — NON-APPOINTMENT (OUTPATIENT)
Age: 79
End: 2019-06-03

## 2019-06-03 ENCOUNTER — APPOINTMENT (OUTPATIENT)
Dept: CARDIOLOGY | Facility: CLINIC | Age: 79
End: 2019-06-03
Payer: MEDICARE

## 2019-06-03 VITALS
OXYGEN SATURATION: 95 % | HEIGHT: 70 IN | RESPIRATION RATE: 16 BRPM | BODY MASS INDEX: 32.21 KG/M2 | WEIGHT: 225 LBS | SYSTOLIC BLOOD PRESSURE: 140 MMHG | DIASTOLIC BLOOD PRESSURE: 72 MMHG | HEART RATE: 81 BPM

## 2019-06-03 DIAGNOSIS — I10 ESSENTIAL (PRIMARY) HYPERTENSION: ICD-10-CM

## 2019-06-03 DIAGNOSIS — Z01.810 ENCOUNTER FOR PREPROCEDURAL CARDIOVASCULAR EXAMINATION: ICD-10-CM

## 2019-06-03 PROCEDURE — 93000 ELECTROCARDIOGRAM COMPLETE: CPT

## 2019-06-03 PROCEDURE — 99214 OFFICE O/P EST MOD 30 MIN: CPT | Mod: 25

## 2019-06-03 RX ORDER — PREDNISONE 10 MG/1
10 TABLET ORAL
Qty: 70 | Refills: 0 | Status: DISCONTINUED | COMMUNITY
Start: 2019-05-01 | End: 2019-06-03

## 2019-06-03 NOTE — ASSESSMENT
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

## 2019-06-03 NOTE — HISTORY OF PRESENT ILLNESS
[FreeTextEntry1] : 78 year old male without significant cardiac history , chronic  eczema ,who came for pre op evaluation for zenkars diverticular surgery , Patient was noted to have abnormal EKG warranted cardiology evaluation . Patient denies any prior cardiac history , patient was suffering with  shingles , with severe pain , with fever , during that time ekg showed sinus tachycardia 133 with RBBB , which is not present prior ekg and follow up ekg when he was not in pain or having fever . \par \par Patient denies any exertional chest pain or shortness of breath , patient does have high power ball room \par 5 night  a week for many years , no change in recent exercise capacity \par \par patient  had recommended tests including  echo and  nuclear stress test  results were acceptable

## 2019-06-03 NOTE — PHYSICAL EXAM
[General Appearance - Well Developed] : well developed [Normal Conjunctiva] : the conjunctiva exhibited no abnormalities [Normal Oral Mucosa] : normal oral mucosa [] : no respiratory distress [Respiration, Rhythm And Depth] : normal respiratory rhythm and effort [Exaggerated Use Of Accessory Muscles For Inspiration] : no accessory muscle use [Auscultation Breath Sounds / Voice Sounds] : lungs were clear to auscultation bilaterally [Chest Palpation] : palpation of the chest revealed no abnormalities [Lungs Percussion] : the lungs were normal to percussion [Heart Rate And Rhythm] : heart rate and rhythm were normal [Heart Sounds] : normal S1 and S2 [Bowel Sounds] : normal bowel sounds [Abdomen Tenderness] : non-tender [Abnormal Walk] : normal gait [Nail Clubbing] : no clubbing of the fingernails [Skin Color & Pigmentation] : normal skin color and pigmentation [Trunk] : on the trunk [Oriented To Time, Place, And Person] : oriented to person, place, and time [FreeTextEntry1] : eczema

## 2019-06-03 NOTE — REVIEW OF SYSTEMS
[Negative] : Heme/Lymph [Fever] : no fever [Blurry Vision] : no blurred vision [Earache] : no earache [Shortness Of Breath] : no shortness of breath [Palpitations] : no palpitations [Chest Pain] : no chest pain [Cough] : no cough [Abdominal Pain] : no abdominal pain [Dysphagia] : no dysphagia [Urinary Frequency] : no change in urinary frequency [Impotence] : no impotence [Joint Pain] : no joint pain [Skin: A Rash] : no rash: [Dizziness] : no dizziness [Skin Lesions] : no skin lesions [Excessive Thirst] : no polydipsia [Confusion] : no confusion was observed [Easy Bleeding] : no tendency for easy bleeding

## 2019-06-05 ENCOUNTER — APPOINTMENT (OUTPATIENT)
Dept: DERMATOLOGY | Facility: CLINIC | Age: 79
End: 2019-06-05
Payer: MEDICARE

## 2019-06-05 PROCEDURE — 99213 OFFICE O/P EST LOW 20 MIN: CPT

## 2019-06-05 NOTE — PHYSICAL EXAM
[Alert] : alert [Oriented x 3] : ~L oriented x 3 [Well Nourished] : well nourished [FreeTextEntry3] : A skin exam was performed from the belt up including the scalp, face (including the lips, ears, nose and eyes), neck, chest, abdomen, back and upper extremities.  The patient declined examination below the belt.\par \par Papules, scale and crust, diffusely on the upper > lower trunk, and proximal > distal UE's.

## 2019-06-05 NOTE — HISTORY OF PRESENT ILLNESS
[FreeTextEntry1] : Rash and pruritus. [de-identified] : He notes improved since taking prednisone, but with some progression despite topical lidex over the past weeks.  No tenderness, no bleeding.

## 2019-06-05 NOTE — ASSESSMENT
[FreeTextEntry1] : Eczematoid dermatitis\par ? etiology.\par Unable to patch test given extent on the back currently.\par Recommend systemic tx, ie. dupixent.\par Patient would like to pursue topicals for an additional month\par Clobetasol spray bid, and check in 1 month.

## 2019-06-11 ENCOUNTER — OUTPATIENT (OUTPATIENT)
Dept: OUTPATIENT SERVICES | Facility: HOSPITAL | Age: 79
LOS: 1 days | End: 2019-06-11

## 2019-06-11 DIAGNOSIS — K22.5 DIVERTICULUM OF ESOPHAGUS, ACQUIRED: ICD-10-CM

## 2019-06-11 LAB
BLD GP AB SCN SERPL QL: NEGATIVE — SIGNIFICANT CHANGE UP
RH IG SCN BLD-IMP: POSITIVE — SIGNIFICANT CHANGE UP

## 2019-06-11 RX ORDER — FLUTICASONE PROPIONATE 220 MCG
1 AEROSOL WITH ADAPTER (GRAM) INHALATION
Qty: 0 | Refills: 0 | DISCHARGE

## 2019-06-11 RX ORDER — LANOLIN/MINERAL OIL
1 LOTION (ML) TOPICAL
Qty: 0 | Refills: 0 | DISCHARGE

## 2019-06-11 NOTE — H&P PST ADULT - HISTORY OF PRESENT ILLNESS
79 y/o male w/ PMH of Prostate ca, shingles 1 mo ago, Asthma (deniers intubations or hospitalizations and dermatitis. Presents to PST w/ a preop dx of Diverticulum of esophagus, acquired and to be evaluated for a scheduled direct laryngoscopy esophagoscopy, endoscopic Zenker's diverticulectomy, possible cervical approach on 4/25/19.   Pt. states for 1 year noted a clog in throat, difficulty swallowing at times. 1 mo ago went to [facility] due to shingles episode, as part of all of the work up done was recommend to f/u w/ pcp since a swallow test done and Zenker's diverticulum noted. Pt referred to ENT, Dr Moya evaluated him and

## 2019-06-11 NOTE — H&P PST ADULT - ENDOCRINE COMMENTS
states thyroid hx, no meds. endo seen, nodules to thyroid noted, radiated iodine done and resolved issues. states TSH wnl last done 6 mos ago. PreDM no meds stated. A1c 6.2.

## 2019-06-11 NOTE — H&P PST ADULT - NEGATIVE ALLERGY TYPES
no reactions to food/no reactions to animals/no outdoor environmental allergies/no indoor environmental allergies/no reactions to insect bites

## 2019-06-11 NOTE — H&P PST ADULT - NEGATIVE GASTROINTESTINAL SYMPTOMS
Patient Education        Shoulder Stretches: Exercises  Your Care Instructions  Here are some examples of exercises for your shoulder. Start each exercise slowly. Ease off the exercise if you start to have pain. Your doctor or physical therapist will tell you when you can start these exercises and which ones will work best for you. How to do the exercises  Shoulder stretch    1.  a doorway and place one arm against the door frame. Your elbow should be a little higher than your shoulder. 2. Relax your shoulders as you lean forward, allowing your chest and shoulder muscles to stretch. You can also turn your body slightly away from your arm to stretch the muscles even more. 3. Hold for 15 to 30 seconds. 4. Repeat 2 to 4 times with each arm. Shoulder and chest stretch    1. Shoulder and chest stretch  2. While sitting, relax your upper body so you slump slightly in your chair. 3. As you breathe in, straighten your back and open your arms out to the sides. 4. Gently pull your shoulder blades back and downward. 5. Hold for 15 to 30 seconds as your breathe normally. 6. Repeat 2 to 4 times. Overhead stretch    1. Reach up over your head with both arms. 2. Hold for 15 to 30 seconds. 3. Repeat 2 to 4 times. Follow-up care is a key part of your treatment and safety. Be sure to make and go to all appointments, and call your doctor if you are having problems. It's also a good idea to know your test results and keep a list of the medicines you take. Where can you learn more? Go to [email or web address] and sign in to your [employer] account. Enter S254 in the [facility] box to learn more about \"Shoulder Stretches: Exercises. \"     If you do not have an account, please click on the \"Sign Up Now\" link. Current as of: November 29, 2017  Content Version: 11.7  © 4894-5356 [employer]. Care instructions adapted under license by [facility] ([facility]).  If you have questions about a medical condition or this instruction, always ask your healthcare professional. [place] [address] any warranty or liability for your use of this information. no steatorrhea/no vomiting/no constipation/no diarrhea/no change in bowel habits/no jaundice/no hiccoughs/no abdominal pain/no nausea/no melena/no flatulence

## 2019-06-11 NOTE — H&P PST ADULT - NSANTHOSAYNRD_GEN_A_CORE
No. LOIS screening performed.  STOP BANG Legend: 0-2 = LOW Risk; 3-4 = INTERMEDIATE Risk; 5-8 = HIGH Risk/never tested

## 2019-06-11 NOTE — H&P PST ADULT - NEGATIVE CARDIOVASCULAR SYMPTOMS
no palpitations/no dyspnea on exertion/no peripheral edema/no orthopnea/no claudication/no chest pain/no paroxysmal nocturnal dyspnea

## 2019-06-11 NOTE — H&P PST ADULT - NEGATIVE GENERAL GENITOURINARY SYMPTOMS
no renal colic/no flank pain R/no dysuria/normal urinary frequency/no urine discoloration/no bladder infections/no flank pain L/no incontinence/no hematuria/no urinary hesitancy/no nocturia

## 2019-06-11 NOTE — H&P PST ADULT - NEGATIVE MUSCULOSKELETAL SYMPTOMS
no muscle cramps/no stiffness/no arm pain L/no arm pain R/no back pain/no joint swelling/no muscle weakness/no myalgia

## 2019-06-25 PROBLEM — B02.9 ZOSTER WITHOUT COMPLICATIONS: Chronic | Status: ACTIVE | Noted: 2019-06-11

## 2019-07-01 ENCOUNTER — APPOINTMENT (OUTPATIENT)
Dept: DERMATOLOGY | Facility: CLINIC | Age: 79
End: 2019-07-01

## 2019-07-01 ENCOUNTER — INPATIENT (INPATIENT)
Facility: HOSPITAL | Age: 79
LOS: 9 days | Discharge: HOME | DRG: 684 | End: 2019-07-11
Attending: INTERNAL MEDICINE | Admitting: INTERNAL MEDICINE
Payer: MEDICARE

## 2019-07-01 VITALS
HEART RATE: 93 BPM | OXYGEN SATURATION: 95 % | HEIGHT: 69 IN | RESPIRATION RATE: 18 BRPM | TEMPERATURE: 98 F | SYSTOLIC BLOOD PRESSURE: 165 MMHG | WEIGHT: 227.96 LBS | DIASTOLIC BLOOD PRESSURE: 96 MMHG

## 2019-07-01 PROCEDURE — 74176 CT ABD & PELVIS W/O CONTRAST: CPT | Mod: 26

## 2019-07-01 PROCEDURE — 99291 CRITICAL CARE FIRST HOUR: CPT | Mod: GC

## 2019-07-01 NOTE — ED PROVIDER NOTE - NS ED ROS FT
General: + fever, chills  HENT: denies nasal congestion, rhinorrhea  Eyes: denies visual changes, blurred vision  CV: denies chest pain, palpitations  Resp: denies difficulty breathing, cough  Abdominal: + nausea, vomiting, R flank pain  MSK: denies muscle aches, leg swelling  Neuro: denies headaches, numbness, tingling  Skin: denies rashes, bruises

## 2019-07-01 NOTE — ED PROVIDER NOTE - PHYSICAL EXAMINATION
Attending Geetha Marks: Gen: NAD, heent: atrauamtic, eomi, perrla, mmm, op pink, uvula midline, neck; nttp, no nuchal rigidity, chest: nttp, no crepitus, cv: rrr, no murmurs, lungs: ctab, abd: soft, umbilical hernia can reduce, lower abdominal pain no peritoneal signs, +BS, no guarding, ext: wwp, neg homans, skin: no rash, neuro: awake and alert, following commands, speech clear, sensation and strength intact, no focal deficits Attending Geetha Marks: Gen: NAD, heent: atrauamtic, eomi, perrla, mmm, op pink, uvula midline, neck; nttp, no nuchal rigidity, chest: nttp, no crepitus, cv: rrr, no murmurs, lungs: ctab, abd: soft, umbilical hernia can reduce, lower abdominal pain no peritoneal signs, +BS, no guarding, ext: wwp, neg homans, skin: no rash, neuro: awake and alert, following commands, speech clear, sensation and strength intact, no focal deficits    CONSTITUTIONAL: awake, alert, painful distress  HEAD: Normocephalic; atraumatic  ENMT: External appears normal, MMM  NECK: no tenderness, FROM  CARD: Normal Sl, S2; no audible murmurs  RESP: normal wob, lungs ctab  ABD: soft, non-distended; + R cva tenderness  EXT: no edema, normal ROM in all four extremities  NEURO: aaox3, moving all extremities spontaneously

## 2019-07-01 NOTE — ED PROVIDER NOTE - OBJECTIVE STATEMENT
Attending Geetha Marks: 77 y/o male h/o enlarged prostate follows with dr maciel from urology who was admitted to [facility] a few weeks ago with shingles presenting with b/l flank pain. pt states has been feeling increased weakness and having low grade temperatures. currently on abx for possible urine infection, cipro. also reports difficulty urinating. pt states voids frequently. no testicular pain. also voids often. now having pain to right flank. no numbness or tingling. last temperature of 99 Attending Geetha Marks: 77 y/o male h/o enlarged prostate, prostate CA follows with dr maciel from urology who was admitted to [facility] a few weeks ago with shingles presenting with b/l flank pain. pt states has been feeling increased weakness and having low grade temperatures. currently on abx for possible urine infection, cipro. also reports difficulty urinating. pt states voids frequently. no testicular pain. also voids often. now having pain to right flank. no numbness or tingling. last temperature of 99

## 2019-07-01 NOTE — ED PROVIDER NOTE - CLINICAL SUMMARY MEDICAL DECISION MAKING FREE TEXT BOX
78M w/ R flank pain, intermittent, waxes and wanes, was recently given abx, CT r/o stone, considered dissection but low suspicion because patient BP is elevated, will evaluate w/ CT, considered appi but pain is posterior, no RLQ tenderness 78M w/ R flank pain, intermittent, waxes and wanes, was recently given abx, CT r/o stone, considered dissection but low suspicion because patient BP is elevated, will evaluate w/ CT, considered appi but pain is posterior, no RLQ tenderness  Attending Geetha Marks: 77 y/o male presenting with flank pain and difficulty voiding with associated generalized weakness. bladder scan shows evidence of sig urinary retetnion. pt likely obstructed for last few weeks. h/o prostate ca, with trending upward PSA. labs showed evidence of sig renal failure. chang placed. urology and nephrology to evaluate. afebrile. pt will need admission, close monitoring of kidney function

## 2019-07-01 NOTE — ED PROVIDER NOTE - ATTENDING CONTRIBUTION TO CARE
Attending MD Geetha Marks:  I personally have seen and examined this patient.  Resident note reviewed and agree on plan of care and except where noted.  See HPI, PE, and MDM for details.

## 2019-07-01 NOTE — ED PROVIDER NOTE - PROGRESS NOTE DETAILS
Attending Geetha Marks: post void of apprxoiamtey 1 liter Attending Geetha Marks: labs showed evidence of elevated creatinine. likely post obstructive. iniital k hemolyzed. ekg without changes. d/w nephrology who will evaluate. Attending Geetha Marks: urology evaluating patient

## 2019-07-02 DIAGNOSIS — N13.9 OBSTRUCTIVE AND REFLUX UROPATHY, UNSPECIFIED: ICD-10-CM

## 2019-07-02 DIAGNOSIS — C61 MALIGNANT NEOPLASM OF PROSTATE: ICD-10-CM

## 2019-07-02 DIAGNOSIS — N17.9 ACUTE KIDNEY FAILURE, UNSPECIFIED: ICD-10-CM

## 2019-07-02 DIAGNOSIS — Z29.9 ENCOUNTER FOR PROPHYLACTIC MEASURES, UNSPECIFIED: ICD-10-CM

## 2019-07-02 DIAGNOSIS — K22.5 DIVERTICULUM OF ESOPHAGUS, ACQUIRED: ICD-10-CM

## 2019-07-02 DIAGNOSIS — L30.9 DERMATITIS, UNSPECIFIED: ICD-10-CM

## 2019-07-02 DIAGNOSIS — B02.9 ZOSTER WITHOUT COMPLICATIONS: ICD-10-CM

## 2019-07-02 DIAGNOSIS — N13.30 UNSPECIFIED HYDRONEPHROSIS: ICD-10-CM

## 2019-07-02 DIAGNOSIS — N19 UNSPECIFIED KIDNEY FAILURE: ICD-10-CM

## 2019-07-02 LAB
ALBUMIN SERPL ELPH-MCNC: 3.3 G/DL — SIGNIFICANT CHANGE UP (ref 3.3–5)
ALBUMIN SERPL ELPH-MCNC: 3.6 G/DL — SIGNIFICANT CHANGE UP (ref 3.3–5)
ALP SERPL-CCNC: 54 U/L — SIGNIFICANT CHANGE UP (ref 40–120)
ALP SERPL-CCNC: 54 U/L — SIGNIFICANT CHANGE UP (ref 40–120)
ALT FLD-CCNC: 11 U/L — SIGNIFICANT CHANGE UP (ref 10–45)
ALT FLD-CCNC: 12 U/L — SIGNIFICANT CHANGE UP (ref 10–45)
ANION GAP SERPL CALC-SCNC: 14 MMOL/L — SIGNIFICANT CHANGE UP (ref 5–17)
ANION GAP SERPL CALC-SCNC: 20 MMOL/L — HIGH (ref 5–17)
ANION GAP SERPL CALC-SCNC: 21 MMOL/L — HIGH (ref 5–17)
APPEARANCE UR: CLEAR — SIGNIFICANT CHANGE UP
AST SERPL-CCNC: 14 U/L — SIGNIFICANT CHANGE UP (ref 10–40)
AST SERPL-CCNC: 38 U/L — SIGNIFICANT CHANGE UP (ref 10–40)
BACTERIA # UR AUTO: NEGATIVE — SIGNIFICANT CHANGE UP
BASE EXCESS BLDV CALC-SCNC: -3.8 MMOL/L — LOW (ref -2–2)
BASOPHILS # BLD AUTO: 0 K/UL — SIGNIFICANT CHANGE UP (ref 0–0.2)
BASOPHILS # BLD AUTO: 0.1 K/UL — SIGNIFICANT CHANGE UP (ref 0–0.2)
BASOPHILS NFR BLD AUTO: 0.3 % — SIGNIFICANT CHANGE UP (ref 0–2)
BASOPHILS NFR BLD AUTO: 0.6 % — SIGNIFICANT CHANGE UP (ref 0–2)
BILIRUB SERPL-MCNC: 0.5 MG/DL — SIGNIFICANT CHANGE UP (ref 0.2–1.2)
BILIRUB SERPL-MCNC: 0.6 MG/DL — SIGNIFICANT CHANGE UP (ref 0.2–1.2)
BILIRUB UR-MCNC: NEGATIVE — SIGNIFICANT CHANGE UP
BUN SERPL-MCNC: 52 MG/DL — HIGH (ref 7–23)
BUN SERPL-MCNC: 68 MG/DL — HIGH (ref 7–23)
BUN SERPL-MCNC: 74 MG/DL — HIGH (ref 7–23)
CA-I SERPL-SCNC: 1.17 MMOL/L — SIGNIFICANT CHANGE UP (ref 1.12–1.3)
CALCIUM SERPL-MCNC: 9.1 MG/DL — SIGNIFICANT CHANGE UP (ref 8.4–10.5)
CALCIUM SERPL-MCNC: 9.2 MG/DL — SIGNIFICANT CHANGE UP (ref 8.4–10.5)
CALCIUM SERPL-MCNC: 9.3 MG/DL — SIGNIFICANT CHANGE UP (ref 8.4–10.5)
CHLORIDE BLDV-SCNC: 107 MMOL/L — SIGNIFICANT CHANGE UP (ref 96–108)
CHLORIDE SERPL-SCNC: 101 MMOL/L — SIGNIFICANT CHANGE UP (ref 96–108)
CHLORIDE SERPL-SCNC: 102 MMOL/L — SIGNIFICANT CHANGE UP (ref 96–108)
CHLORIDE SERPL-SCNC: 107 MMOL/L — SIGNIFICANT CHANGE UP (ref 96–108)
CHLORIDE UR-SCNC: 38 MMOL/L — SIGNIFICANT CHANGE UP
CO2 BLDV-SCNC: 23 MMOL/L — SIGNIFICANT CHANGE UP (ref 22–30)
CO2 SERPL-SCNC: 17 MMOL/L — LOW (ref 22–31)
CO2 SERPL-SCNC: 22 MMOL/L — SIGNIFICANT CHANGE UP (ref 22–31)
CO2 SERPL-SCNC: 24 MMOL/L — SIGNIFICANT CHANGE UP (ref 22–31)
COLOR SPEC: SIGNIFICANT CHANGE UP
CREAT ?TM UR-MCNC: 93 MG/DL — SIGNIFICANT CHANGE UP
CREAT SERPL-MCNC: 10.5 MG/DL — HIGH (ref 0.5–1.3)
CREAT SERPL-MCNC: 10.74 MG/DL — HIGH (ref 0.5–1.3)
CREAT SERPL-MCNC: 6.82 MG/DL — HIGH (ref 0.5–1.3)
CULTURE RESULTS: SIGNIFICANT CHANGE UP
DIFF PNL FLD: ABNORMAL
EOSINOPHIL # BLD AUTO: 0.1 K/UL — SIGNIFICANT CHANGE UP (ref 0–0.5)
EOSINOPHIL # BLD AUTO: 0.2 K/UL — SIGNIFICANT CHANGE UP (ref 0–0.5)
EOSINOPHIL NFR BLD AUTO: 0.8 % — SIGNIFICANT CHANGE UP (ref 0–6)
EOSINOPHIL NFR BLD AUTO: 1.5 % — SIGNIFICANT CHANGE UP (ref 0–6)
EPI CELLS # UR: 0 — SIGNIFICANT CHANGE UP
GAS PNL BLDV: 141 MMOL/L — SIGNIFICANT CHANGE UP (ref 135–145)
GAS PNL BLDV: SIGNIFICANT CHANGE UP
GAS PNL BLDV: SIGNIFICANT CHANGE UP
GLUCOSE BLDV-MCNC: 132 MG/DL — HIGH (ref 70–99)
GLUCOSE SERPL-MCNC: 126 MG/DL — HIGH (ref 70–99)
GLUCOSE SERPL-MCNC: 136 MG/DL — HIGH (ref 70–99)
GLUCOSE SERPL-MCNC: 141 MG/DL — HIGH (ref 70–99)
GLUCOSE UR QL: NEGATIVE — SIGNIFICANT CHANGE UP
HCO3 BLDV-SCNC: 22 MMOL/L — SIGNIFICANT CHANGE UP (ref 21–29)
HCT VFR BLD CALC: 43 % — SIGNIFICANT CHANGE UP (ref 39–50)
HCT VFR BLD CALC: 43.2 % — SIGNIFICANT CHANGE UP (ref 39–50)
HCT VFR BLDA CALC: 42 % — SIGNIFICANT CHANGE UP (ref 39–50)
HGB BLD CALC-MCNC: 13.9 G/DL — SIGNIFICANT CHANGE UP (ref 13–17)
HGB BLD-MCNC: 14.1 G/DL — SIGNIFICANT CHANGE UP (ref 13–17)
HGB BLD-MCNC: 14.6 G/DL — SIGNIFICANT CHANGE UP (ref 13–17)
HYALINE CASTS # UR AUTO: 0 /LPF — SIGNIFICANT CHANGE UP (ref 0–7)
KETONES UR-MCNC: NEGATIVE — SIGNIFICANT CHANGE UP
LACTATE BLDV-MCNC: 1.6 MMOL/L — SIGNIFICANT CHANGE UP (ref 0.7–2)
LEUKOCYTE ESTERASE UR-ACNC: NEGATIVE — SIGNIFICANT CHANGE UP
LIDOCAIN IGE QN: 59 U/L — SIGNIFICANT CHANGE UP (ref 7–60)
LYMPHOCYTES # BLD AUTO: 0.9 K/UL — LOW (ref 1–3.3)
LYMPHOCYTES # BLD AUTO: 0.9 K/UL — LOW (ref 1–3.3)
LYMPHOCYTES # BLD AUTO: 8.6 % — LOW (ref 13–44)
LYMPHOCYTES # BLD AUTO: 9.4 % — LOW (ref 13–44)
MAGNESIUM SERPL-MCNC: 2.5 MG/DL — SIGNIFICANT CHANGE UP (ref 1.6–2.6)
MCHC RBC-ENTMCNC: 31.8 PG — SIGNIFICANT CHANGE UP (ref 27–34)
MCHC RBC-ENTMCNC: 32.3 PG — SIGNIFICANT CHANGE UP (ref 27–34)
MCHC RBC-ENTMCNC: 32.8 GM/DL — SIGNIFICANT CHANGE UP (ref 32–36)
MCHC RBC-ENTMCNC: 33.7 GM/DL — SIGNIFICANT CHANGE UP (ref 32–36)
MCV RBC AUTO: 96 FL — SIGNIFICANT CHANGE UP (ref 80–100)
MCV RBC AUTO: 96.8 FL — SIGNIFICANT CHANGE UP (ref 80–100)
MONOCYTES # BLD AUTO: 0.9 K/UL — SIGNIFICANT CHANGE UP (ref 0–0.9)
MONOCYTES # BLD AUTO: 0.9 K/UL — SIGNIFICANT CHANGE UP (ref 0–0.9)
MONOCYTES NFR BLD AUTO: 8.6 % — SIGNIFICANT CHANGE UP (ref 2–14)
MONOCYTES NFR BLD AUTO: 9.2 % — SIGNIFICANT CHANGE UP (ref 2–14)
NEUTROPHILS # BLD AUTO: 8 K/UL — HIGH (ref 1.8–7.4)
NEUTROPHILS # BLD AUTO: 8.1 K/UL — HIGH (ref 1.8–7.4)
NEUTROPHILS NFR BLD AUTO: 79.3 % — HIGH (ref 43–77)
NEUTROPHILS NFR BLD AUTO: 81.6 % — HIGH (ref 43–77)
NITRITE UR-MCNC: NEGATIVE — SIGNIFICANT CHANGE UP
OSMOLALITY UR: 286 MOS/KG — LOW (ref 300–900)
OTHER CELLS CSF MANUAL: 12 ML/DL — LOW (ref 18–22)
PCO2 BLDV: 43 MMHG — SIGNIFICANT CHANGE UP (ref 35–50)
PH BLDV: 7.32 — LOW (ref 7.35–7.45)
PH UR: 6 — SIGNIFICANT CHANGE UP (ref 5–8)
PHOSPHATE SERPL-MCNC: 5.4 MG/DL — HIGH (ref 2.5–4.5)
PLATELET # BLD AUTO: 284 K/UL — SIGNIFICANT CHANGE UP (ref 150–400)
PLATELET # BLD AUTO: 322 K/UL — SIGNIFICANT CHANGE UP (ref 150–400)
PO2 BLDV: 36 MMHG — SIGNIFICANT CHANGE UP (ref 25–45)
POTASSIUM BLDV-SCNC: 6.6 MMOL/L — CRITICAL HIGH (ref 3.5–5.3)
POTASSIUM SERPL-MCNC: 5.2 MMOL/L — SIGNIFICANT CHANGE UP (ref 3.5–5.3)
POTASSIUM SERPL-MCNC: 5.3 MMOL/L — SIGNIFICANT CHANGE UP (ref 3.5–5.3)
POTASSIUM SERPL-MCNC: 7.3 MMOL/L — CRITICAL HIGH (ref 3.5–5.3)
POTASSIUM SERPL-SCNC: 5.2 MMOL/L — SIGNIFICANT CHANGE UP (ref 3.5–5.3)
POTASSIUM SERPL-SCNC: 5.3 MMOL/L — SIGNIFICANT CHANGE UP (ref 3.5–5.3)
POTASSIUM SERPL-SCNC: 7.3 MMOL/L — CRITICAL HIGH (ref 3.5–5.3)
PROT ?TM UR-MCNC: 107 MG/DL — HIGH (ref 0–12)
PROT SERPL-MCNC: 7.5 G/DL — SIGNIFICANT CHANGE UP (ref 6–8.3)
PROT SERPL-MCNC: 7.8 G/DL — SIGNIFICANT CHANGE UP (ref 6–8.3)
PROT UR-MCNC: ABNORMAL
PROT/CREAT UR-RTO: 1.2 RATIO — HIGH (ref 0–0.2)
RBC # BLD: 4.44 M/UL — SIGNIFICANT CHANGE UP (ref 4.2–5.8)
RBC # BLD: 4.5 M/UL — SIGNIFICANT CHANGE UP (ref 4.2–5.8)
RBC # FLD: 12.8 % — SIGNIFICANT CHANGE UP (ref 10.3–14.5)
RBC # FLD: 13 % — SIGNIFICANT CHANGE UP (ref 10.3–14.5)
RBC CASTS # UR COMP ASSIST: 2 /HPF — SIGNIFICANT CHANGE UP (ref 0–4)
SAO2 % BLDV: 61 % — LOW (ref 67–88)
SODIUM SERPL-SCNC: 140 MMOL/L — SIGNIFICANT CHANGE UP (ref 135–145)
SODIUM SERPL-SCNC: 143 MMOL/L — SIGNIFICANT CHANGE UP (ref 135–145)
SODIUM SERPL-SCNC: 145 MMOL/L — SIGNIFICANT CHANGE UP (ref 135–145)
SODIUM UR-SCNC: 62 MMOL/L — SIGNIFICANT CHANGE UP
SP GR SPEC: 1.01 — SIGNIFICANT CHANGE UP (ref 1.01–1.02)
SPECIMEN SOURCE: SIGNIFICANT CHANGE UP
UROBILINOGEN FLD QL: NEGATIVE — SIGNIFICANT CHANGE UP
WBC # BLD: 10.1 K/UL — SIGNIFICANT CHANGE UP (ref 3.8–10.5)
WBC # BLD: 9.9 K/UL — SIGNIFICANT CHANGE UP (ref 3.8–10.5)
WBC # FLD AUTO: 10.1 K/UL — SIGNIFICANT CHANGE UP (ref 3.8–10.5)
WBC # FLD AUTO: 9.9 K/UL — SIGNIFICANT CHANGE UP (ref 3.8–10.5)
WBC UR QL: 2 /HPF — SIGNIFICANT CHANGE UP (ref 0–5)

## 2019-07-02 PROCEDURE — 76770 US EXAM ABDO BACK WALL COMP: CPT | Mod: 26

## 2019-07-02 PROCEDURE — 99223 1ST HOSP IP/OBS HIGH 75: CPT

## 2019-07-02 PROCEDURE — 99223 1ST HOSP IP/OBS HIGH 75: CPT | Mod: GC

## 2019-07-02 RX ORDER — SODIUM CHLORIDE 9 MG/ML
1000 INJECTION, SOLUTION INTRAVENOUS ONCE
Refills: 0 | Status: COMPLETED | OUTPATIENT
Start: 2019-07-02 | End: 2019-07-02

## 2019-07-02 RX ORDER — FLUOCINONIDE/EMOLLIENT BASE 0.05 %
1 CREAM (GRAM) TOPICAL
Refills: 0 | Status: DISCONTINUED | OUTPATIENT
Start: 2019-07-02 | End: 2019-07-11

## 2019-07-02 RX ORDER — ONDANSETRON 8 MG/1
4 TABLET, FILM COATED ORAL ONCE
Refills: 0 | Status: COMPLETED | OUTPATIENT
Start: 2019-07-02 | End: 2019-07-02

## 2019-07-02 RX ORDER — FLUTICASONE PROPIONATE 0.5 MG/G
1 CREAM TOPICAL
Qty: 0 | Refills: 0 | DISCHARGE

## 2019-07-02 RX ORDER — GABAPENTIN 400 MG/1
400 CAPSULE ORAL THREE TIMES A DAY
Refills: 0 | Status: DISCONTINUED | OUTPATIENT
Start: 2019-07-02 | End: 2019-07-07

## 2019-07-02 RX ADMIN — SODIUM CHLORIDE 1000 MILLILITER(S): 9 INJECTION, SOLUTION INTRAVENOUS at 03:52

## 2019-07-02 RX ADMIN — SODIUM CHLORIDE 1000 MILLILITER(S): 9 INJECTION, SOLUTION INTRAVENOUS at 01:38

## 2019-07-02 NOTE — H&P ADULT - NSHPSOCIALHISTORY_GEN_ALL_CORE
Social History:    Marital Status:  (  x ) has girlfriend (   ) Single    (   )    (  )   Occupation: retired former /marketing  Lives with: (  ) alone  (  ) children   (  ) spouse   (  ) parents  ( x ) other: girlfriend    Substance Use (street drugs): ( x ) never used  (  ) other:  Tobacco Usage:  ( x  ) never smoked   (   ) former smoker   (   ) current smoker  (     ) pack year  (        ) last cigarette date  Alcohol Usage: occasional

## 2019-07-02 NOTE — H&P ADULT - PROBLEM SELECTOR PLAN 4
-check PSA, urology eval, mgt of juancarlos as noted.   -will likely need additional scans, bone scan vs. pan CT vs. PET  -CTAP got portion of R lung base showing nodules, unclear if prior but metastatic prostate ca is on ddx  -awaiting urology eval for further management recs and imaging recs if needed.   -as noted in hpi pt has adamantly refused or delayed surveillance scans as well as any offer for hormonal tx/surgery/RT in past as noted by urologist Dr. Dhillon. -check PSA, urology eval, mgt of juancarlos as noted.   -will likely need additional scans, bone scan vs. pan CT vs. PET  -CTAP got portion of R lung base showing nodules, unclear if prior but metastatic prostate ca is on ddx  -awaiting urology eval for further management recs and imaging recs if needed; to be seen by attending in am, day team to follow up recs.   -as noted in hpi pt has adamantly refused or delayed surveillance scans as well as any offer for hormonal tx/surgery/RT in past as noted by urologist Dr. Dhillon.

## 2019-07-02 NOTE — H&P ADULT - NSHPPHYSICALEXAM_GEN_ALL_CORE
PHYSICAL EXAM:  GENERAL: NAD, disheveled, chronically ill appearing.   HEAD:  Atraumatic, Normocephalic  EYES: EOMI, PERRLA, conjunctiva and sclera clear  ENMT: No tonsillar erythema, exudates, or enlargement; dry mucous membranes, poor dentition, malodorous breath. no obv pooling secretions.   NECK: Supple, No JVD, Normal thyroid  CHEST/LUNG: Clear to percussion bilaterally with dec bs at bases; No rales, rhonchi, wheezing, or rubs no resp distress or accessory muscles.   HEART: Regular rate and rhythm; No murmurs, rubs, or gallops no sig Le edema.  ABDOMEN: Soft, Nontender, Nondistended; Bowel sounds present, no rebound/guarding  BACK: no midline vertebral TTP. NO CVA ttp over either R or L side, pain resolved after chang placed.   EXTREMITIES:  2+ Peripheral Pulses, No clubbing, cyanosis, rom intact.   LYMPH: No lymphadenopathy noted, no lymphangitis  SKIN:+ eczematous plaques on both extensor surfaces of arms, legs, back, scalp. +resolved L facial/posterior auricular shingles rash with residual neuralgia.   NERVOUS SYSTEM:  Alert & Oriented X3, Good concentration; Motor Strength 5/5 B/L upper and lower extremities

## 2019-07-02 NOTE — H&P ADULT - ASSESSMENT
78 M PMH Prostate ca (dx 20 years ago) no chemo/RT in past but with recently noted rising PSA, eczema poorly responsive to topical emollients s/p oral steroid taper 5/2019, Zenker's diverticulum c/b dysphagia and intermittent aspiration, recent admission to [facility] for facial cellulitis in setting eczema s/p IV abx, upon d/c dx with probable facial zoster, now presents with 2 weeks of progressive lethargy and flank pain, f/w b/l hydro/bladder thickening/R perinephric stranding and hematuria in setting of chang placement for acute urinary retention and labwork showing acute kidney injury 2/2 obstructive uropathy, with concern for metastatic prostate cancer underlying his presentation.

## 2019-07-02 NOTE — ED ADULT NURSE REASSESSMENT NOTE - NS ED NURSE REASSESS COMMENT FT1
Lopez catheter placed for urinary retention. Order per MD Marks. Coude tip catheter used, 2nd RN present. Patient tolerated procedure well, sterile technique maintained. 500cc of urine drained.

## 2019-07-02 NOTE — ED ADULT NURSE NOTE - OBJECTIVE STATEMENT
78 YOM A&Ox3 with pmh of DM & enlarged prostate presents to ED for urinary retention and and bilateral flank pain for a few days. Patient states was recently prescribed antibiotics for UTI and has been taking cipro. Upon assessment bladder distended, flank bilaterally and bladder tender to palpation. Patient denies sob, chest pain, n/v/d, headaches & blurry vision. safety maintained.

## 2019-07-02 NOTE — CONSULT NOTE ADULT - ATTENDING COMMENTS
recent normal renal function presenting with prostate symptoms and renal failure  1.  Renal failure--severe depression of function returning with rx 2.  NO HD warranted.  No overt uremia  2.  Obstructive uropathy--chang decompression  3.  Hematuria--bladder irrigation to avoid clot induced catheter dysfunction

## 2019-07-02 NOTE — H&P ADULT - NSHPREVIEWOFSYSTEMS_GEN_ALL_CORE
REVIEW OF SYSTEMS:  CONSTITUTIONAL: +weakness. low grade fevers. No chills. No weight loss. Good appetite.  EYES: No visual changes. No eye pain.  ENT: No hearing difficulty. No vertigo. No dysphagia. No Sinusitis/rhinorrhea.  NECK: No pain. No stiffness/rigidity.  CARDIAC: No chest pain. No palpitations.  RESPIRATORY: No cough. No SOB. No hemoptysis.  GASTROINTESTINAL: No abdominal pain. No nausea. No vomiting. No hematemesis. No diarrhea. No constipation. No melena. No hematochezia.  GENITOURINARY: No dysuria. No frequency. +hesitancy. No hematuria.  NEUROLOGICAL: No numbness. No focal weakness. No incontinence. No headache.  BACK: No back pain. + R flank pain  EXTREMITIES: No lower extremity edema. Full ROM.  SKIN: No rashes. No itching. No other lesions.  PSYCHIATRIC: No depression. No anxiety. No SI/HI.  ALLERGIC: No lip swelling. No hives.  All other review of systems is negative unless indicated above.

## 2019-07-02 NOTE — H&P ADULT - NSHPATTENDINGPLANDISCUSS_GEN_ALL_CORE
patient, urology PA for consult, medicine NP for signout for coverage. patient, urology PA for consult

## 2019-07-02 NOTE — H&P ADULT - ATTENDING COMMENTS
Care to be assumed by [facility] hospitalist at 8 am.  This patient was assigned to me by the hospitalist in charge; my involvement in this case has consisted of the initial history, physical, chart review, and management plan.  This patient was previously unknown to me.  Case endorsed to NP ______ at ____. Care to be assumed by [facility] hospitalist at 8 am.  This patient was assigned to me by the hospitalist in charge; my involvement in this case has consisted of the initial history, physical, chart review, and management plan.  This patient was previously unknown to me.

## 2019-07-02 NOTE — H&P ADULT - PROBLEM SELECTOR PLAN 2
-as noted, labs, hx and imaging consistent with obstruction likely progressive neglected prostate cancer vs. passed stone on ddx.   -urology eval for b/l hydro, prostate cancer, hematuria, as well as CT ap reading R perinephric stranding/perinephric fluid with calcyeal rupture.  Will need to f/u recs regarding ?CT urogram urgently. Urology notified by myself overnight, pending eval from overnight PA.  -keep npo  -maintain chang

## 2019-07-02 NOTE — SBIRT NOTE ADULT - NSSBIRTALCPOSREINDET_GEN_A_CORE
Positive reinforcement provided given patient currently within healthy guidelines. Education material's reviewed.

## 2019-07-02 NOTE — H&P ADULT - PROBLEM/PLAN-3
PSYCHIATRIC CONSULTATION                         IDENTIFICATION:    Patient Name  Dickson Duncan   Date of Birth 1957   [facility] [id number]   Medical Record Number  [id number]      Age  63 y.o. PCP Brent Cheng MD   Admit date:  1/24/2017    Room Number  407/01  @ [facility]   Date of Service  1/25/2017            HISTORY         REASON FOR HOSPITALIZATION/CONSULTATION:  Admitted for altered mental status  CC: \"feeling less confused but no recall of events last week\"    HISTORY OF PRESENT ILLNESS:    Pt with bipolar disorder and under my care for several years. She has history of misusing medication. At times she has had supervised medication from nurses at [facility] where she has an independent apartment. Much of her difficulties relate to chronic pain and intolerable headaches which have strong psychogenic component and insomnia. Mood swings common and usually last for a few weeks. No major manic episodes in history but many that are hypomanic. Depression, bipolar, also seen. Most likely course would have been to her overmedicating herself and then with confusion setting in kept on taking medication. Quetiapine and lorazepam, the latter not taken before admission for a few days given negative drug screen, most likely to cause clinical picture. Pt improving but still confused. I saw her in ED yesterday. ALLERGIES:  Allergies   Allergen Reactions    Dicyclomine Nausea and Vomiting     Extreme stomach pains    Lithium Nausea and Vomiting     Severe nausea and vomiting.  Other Medication Other (comments)     Intolerance to oranges, cabbage,beans,peppers,raw onions,foods with caffeine in them, popcorn, no pop sodas, because of IBS      MEDICATIONS PRIOR TO ADMISSION:  Prescriptions Prior to Admission   Medication Sig    vitamin E (AQUA GEMS) 400 unit capsule Take 400 Units by mouth daily.     loperamide (IMODIUM) 2 mg capsule Take 2 mg by mouth four (4) times daily as needed for Diarrhea.  famotidine (PEPCID) 20 mg tablet Take 1 Tab by mouth two (2) times a day. D/C omeprazole    QUEtiapine (SEROQUEL) 300 mg tablet Take 300 mg by mouth daily.  gabapentin (NEURONTIN) 300 mg capsule TAKE 1 CAPSULE BY MOUTH EVERY EIGHT HOURS AS NEEDED FOR HEADACHE.    L.acidoph&parac-S.therm-Bifido (THEO-Q,2,) 16 billion cell cap 1 cap po QD    fluticasone (FLONASE) 50 mcg/actuation nasal spray USE ONE SPRAY IN EACH NOSTRIL TWO TIMES A DAY.  fenofibrate nanocrystallized (TRICOR) 145 mg tablet Take 1 Tab by mouth every fourty-eight (48) hours.  diphenhydrAMINE (BENADRYL) 50 mg capsule Take 50 mg by mouth nightly.  VILAZODONE HYDROCHLORIDE (VIIBRYD PO) Take 20 mg by mouth daily.  aspirin-acetaminophen-caffeine 250-250-65 mg per tablet Take 1 Tab by mouth every six (6) hours as needed for Pain.  hyoscyamine SL (LEVSIN/SL) 0.125 mg SL tablet DISSOLVE 1 TABLET UNDER TONGUE EVERY SIX (6) HOURS AS NEEDED FOR CRAMPING.  fluorometholone (FML) 0.1 % ophthalmic suspension Administer 1 Drop to both eyes two (2) times a day.  LORazepam (ATIVAN) 2 mg tablet Take 2 mg by mouth three (3) times daily.  food supplement, lactose-free (ENSURE) liqd Take 237 mL by mouth daily.  Cholecalciferol, Vitamin D3, (VITAMIN D3) 1,000 unit cap Take 1 Tab by mouth daily. PAST MEDICAL HISTORY:substance abuse, prescribed medication.   Hospitalizations for bipolar disorder  Active Ambulatory Problems     Diagnosis Date Noted    Arthritis     Gallstones with obstruction of gallbladder 11/23/2010    Choledocholithiasis 11/24/2010    Bipolar affective disorder, manic ([facility] [address] 05/16/2014    IBS (irritable bowel syndrome) 10/30/2014    Chronic headache 10/30/2014    Drop attack 03/25/2015     Resolved Ambulatory Problems     Diagnosis Date Noted    No Resolved Ambulatory Problems     Past Medical History   Diagnosis Date    Bipolar disorder ([facility] [address]     Bladder pain     GERD (gastroesophageal reflux disease)     Headache(784.0)     Irritable bowel     Pelvic pain in female 2014    Psychiatric disorder     Stool color black       Past Medical History   Diagnosis Date    Arthritis     Bipolar disorder ([address]     Bladder pain     Choledocholithiasis 11/24/2010    GERD (gastroesophageal reflux disease)     Headache(784.0)      tension headaches    Irritable bowel     Pelvic pain in female 2014    Psychiatric disorder      BIPOLAR    Stool color black      irritable bowel     Past Surgical History   Procedure Laterality Date    Hx gyn  1998     hysterectomy    Hx gi  2005     prolasped rectum    Laparoscopy abdomen diagnostic  1987    Hx other surgical  1987     EXPLORATORY LAPAROSCOPY    Hx appendectomy  1988    Hx cholecystectomy  11/23/10     cholecystectomy      SOCIAL HISTORY: lives in [facility], independent apartment and managing ADLs with help from sister and friends  Social History     Social History Narrative     Social History   Substance Use Topics    Smoking status: Never Smoker    Smokeless tobacco: Never Used    Alcohol use No      FAMILY HISTORY: History reviewed. No pertinent family history.   Family History   Problem Relation Age of Onset    Hypertension Father     Cancer Father      SKIN CA    Colon Polyps Father     Other Maternal Uncle      CHAROT-MARISELA-TOOTH    Diabetes Paternal Grandfather        REVIEW of SYSTEMS:   Confusion, not complaining of pain/headache             MENTAL STATUS EXAM & VITALS       MENTAL STATUS EXAM:    FINDINGS WITHIN NORMAL LIMITS (WNL) UNLESS OTHERWISE STATED BELOW:    Orientation Alert and Oriented x 1, confusion prominent   Vital Signs (BP,Pulse, Temp) See below (reviewed 1/25/2017)   Gait and Station Not evaluated   Abnormal Muscular Movements/Tone/Behavior No EPS, no Tardive Dyskinesia, no abnormal muscular movements; wnl tone   Relations cooperative, unreliable and vague   General Appearance:  age appropriate and within normal Limits   Language No aphasia or dysarthria   Speech:  normal pitch, normal volume and non-pressured   Thought Processes illogical, wnl rate of thoughts, impaired abstract reasoning and computation   Thought Associations circumstantial and tangential   Thought Content free of delusions, free of hallucinations, not internally preoccupied  and not paranoid.   Reported by staff to have illusions, confused earlier today   Suicidal Ideations none   Homicidal Ideations none   Mood:  stable   Affect:  anxious   Memory recent  impaired   Memory remote:  impaired   Concentration/Attention:  impaired   Fund of Knowledge Fair/average   Insight:  limited   Reliability poor   Judgment:  limited          VITALS:     Patient Vitals for the past 24 hrs:   Temp Pulse Resp BP SpO2   01/25/17 1528 98.3 °F (36.8 °C) 87 11 (!) 167/94 98 %   01/25/17 0806 98.6 °F (37 °C) 91 17 150/86 97 %   01/25/17 0333 98.9 °F (37.2 °C) 94 20 164/85 98 %   01/24/17 2334 98.3 °F (36.8 °C) 96 20 156/84 99 %   01/24/17 2100 - - - - 100 %   01/24/17 2048 97.9 °F (36.6 °C) (!) 102 22 (!) 177/91 99 %   01/24/17 1930 - (!) 110 23 (!) 146/93 97 %   01/24/17 1900 - (!) 104 21 (!) 129/96 97 %   01/24/17 1812 97.8 °F (36.6 °C) (!) 110 19 138/86 95 %   01/24/17 1800 - (!) 108 19 156/90 97 %   01/24/17 1700 - (!) 107 27 (!) 113/94 97 %              DATA     LABORATORY DATA:  Labs Reviewed   CBC WITH AUTOMATED DIFF - Abnormal; Notable for the following:        Result Value    RDW 14.9 (*)     NEUTROPHILS 80 (*)     MONOCYTES 3 (*)     All other components within normal limits   METABOLIC PANEL, COMPREHENSIVE - Abnormal; Notable for the following:     Potassium 3.4 (*)     Chloride 109 (*)     CO2 19 (*)     Glucose 135 (*)     BUN/Creatinine ratio 11 (*)     All other components within normal limits   URINALYSIS W/ REFLEX CULTURE - Abnormal; Notable for the following:     Protein 30 (*)     Ketone TRACE (*)     All other components within normal limits   ACETAMINOPHEN - Abnormal; Notable for the following:     ACETAMINOPHEN <2 (*)     All other components within normal limits   SALICYLATE - Abnormal; Notable for the following:     SALICYLATE 2.6 (*)     All other components within normal limits   METABOLIC PANEL, BASIC - Abnormal; Notable for the following:     Chloride 112 (*)     CO2 19 (*)     Glucose 110 (*)     All other components within normal limits   CBC W/O DIFF - Abnormal; Notable for the following:     RDW 15.5 (*)     All other components within normal limits   SAMPLES BEING HELD   TROPONIN I   CK W/ REFLX CKMB   DRUG SCREEN, URINE     Admission on 01/24/2017   Component Date Value Ref Range Status    WBC 01/24/2017 9.4  3.6 - 11.0 K/uL Final    RBC 01/24/2017 4.82  3.80 - 5.20 M/uL Final    HGB 01/24/2017 13.5  11.5 - 16.0 g/dL Final    HCT 01/24/2017 40.0  35.0 - 47.0 % Final    MCV 01/24/2017 83.0  80.0 - 99.0 FL Final    MCH 01/24/2017 28.0  26.0 - 34.0 PG Final    MCHC 01/24/2017 33.8  30.0 - 36.5 g/dL Final    RDW 01/24/2017 14.9* 11.5 - 14.5 % Final    PLATELET 33/13/1176 797  150 - 400 K/uL Final    NEUTROPHILS 01/24/2017 80* 32 - 75 % Final    LYMPHOCYTES 01/24/2017 17  12 - 49 % Final    MONOCYTES 01/24/2017 3* 5 - 13 % Final    EOSINOPHILS 01/24/2017 0  0 - 7 % Final    BASOPHILS 01/24/2017 0  0 - 1 % Final    ABS. NEUTROPHILS 01/24/2017 7.5  1.8 - 8.0 K/UL Final    ABS. LYMPHOCYTES 01/24/2017 1.6  0.8 - 3.5 K/UL Final    ABS. MONOCYTES 01/24/2017 0.3  0.0 - 1.0 K/UL Final    ABS. EOSINOPHILS 01/24/2017 0.0  0.0 - 0.4 K/UL Final    ABS.  BASOPHILS 01/24/2017 0.0  0.0 - 0.1 K/UL Final    Sodium 01/24/2017 139  136 - 145 mmol/L Final    Potassium 01/24/2017 3.4* 3.5 - 5.1 mmol/L Final    Chloride 01/24/2017 109* 97 - 108 mmol/L Final    CO2 01/24/2017 19* 21 - 32 mmol/L Final    Anion gap 01/24/2017 11  5 - 15 mmol/L Final    Glucose 01/24/2017 135* 65 - 100 mg/dL Final    BUN 01/24/2017 10  6 - 20 MG/DL Final    Creatinine 01/24/2017 0.90  0.55 - 1.02 MG/DL Final    BUN/Creatinine ratio 01/24/2017 11* 12 - 20   Final    GFR est AA 01/24/2017 >60  >60 ml/min/1.73m2 Final    GFR est non-AA 01/24/2017 >60  >60 ml/min/1.73m2 Final    Calcium 01/24/2017 9.1  8.5 - 10.1 MG/DL Final    Bilirubin, total 01/24/2017 0.4  0.2 - 1.0 MG/DL Final    ALT 01/24/2017 34  12 - 78 U/L Final    AST 01/24/2017 24  15 - 37 U/L Final    Alk.  phosphatase 01/24/2017 108  45 - 117 U/L Final    Protein, total 01/24/2017 7.7  6.4 - 8.2 g/dL Final    Albumin 01/24/2017 4.2  3.5 - 5.0 g/dL Final    Globulin 01/24/2017 3.5  2.0 - 4.0 g/dL Final    A-G Ratio 01/24/2017 1.2  1.1 - 2.2   Final    Color 01/24/2017 YELLOW/STRAW    Final    Appearance 01/24/2017 CLEAR  CLEAR   Final    Specific gravity 01/24/2017 1.018  1.003 - 1.030   Final    pH (UA) 01/24/2017 7.0  5.0 - 8.0   Final    Protein 01/24/2017 30* NEG mg/dL Final    Glucose 01/24/2017 NEGATIVE   NEG mg/dL Final    Ketone 01/24/2017 TRACE* NEG mg/dL Final    Bilirubin 01/24/2017 NEGATIVE   NEG   Final    Blood 01/24/2017 NEGATIVE   NEG   Final    Urobilinogen 01/24/2017 0.2  0.2 - 1.0 EU/dL Final    Nitrites 01/24/2017 NEGATIVE   NEG   Final    Leukocyte Esterase 01/24/2017 NEGATIVE   NEG   Final    WBC 01/24/2017 0-4  0 - 4 /hpf Final    RBC 01/24/2017 0-5  0 - 5 /hpf Final    Epithelial cells 01/24/2017 FEW  FEW /lpf Final    Bacteria 01/24/2017 NEGATIVE   NEG /hpf Final    UA:UC IF INDICATED 01/24/2017 CULTURE NOT INDICATED BY UA RESULT  CNI   Final    Hyaline Cast 01/24/2017 0-2  0 - 5 /lpf Final    Troponin-I, Qt. 01/24/2017 <0.04  <0.05 ng/mL Final    Ventricular Rate 01/24/2017 93  BPM Final    Atrial Rate 01/24/2017 93  BPM Final    P-R Interval 01/24/2017 150  ms Final    QRS Duration 01/24/2017 80  ms Final    Q-T Interval 01/24/2017 380  ms Final    QTC Calculation (Bezet) 01/24/2017 472  ms Final    Calculated P Axis 01/24/2017 73  degrees Final    Calculated R Axis 01/24/2017 51  degrees Final    Calculated T Axis 01/24/2017 65  degrees Final    Diagnosis 01/24/2017    Final                    Value:Normal sinus rhythm  When compared with ECG of 19-MAR-2015 16:41,  No significant change was found  Confirmed by [place] Hodgkin, MD, Denzel Graff ([id number]) on 1/24/2017 3:13:14 PM      CK 01/24/2017 182  26 - 192 U/L Final    AMPHETAMINE 01/24/2017 NEGATIVE   NEG   Final    BARBITURATES 01/24/2017 NEGATIVE   NEG   Final    BENZODIAZEPINE 01/24/2017 NEGATIVE   NEG   Final    COCAINE 01/24/2017 NEGATIVE   NEG   Final    METHADONE 01/24/2017 NEGATIVE   NEG   Final    OPIATES 01/24/2017 NEGATIVE   NEG   Final    PCP(PHENCYCLIDINE) 01/24/2017 NEGATIVE   NEG   Final    THC (TH-CANNABINOL) 01/24/2017 NEGATIVE   NEG   Final    Drug screen comment 01/24/2017 (NOTE)   Final    ACETAMINOPHEN 01/24/2017 <2* 10 - 30 ug/mL Final    SALICYLATE 11/80/6710 2.6* 2.8 - 20.0 MG/DL Final    Sodium 01/25/2017 142  136 - 145 mmol/L Final    Potassium 01/25/2017 4.0  3.5 - 5.1 mmol/L Final    Chloride 01/25/2017 112* 97 - 108 mmol/L Final    CO2 01/25/2017 19* 21 - 32 mmol/L Final    Anion gap 01/25/2017 11  5 - 15 mmol/L Final    Glucose 01/25/2017 110* 65 - 100 mg/dL Final    BUN 01/25/2017 10  6 - 20 MG/DL Final    Creatinine 01/25/2017 0.86  0.55 - 1.02 MG/DL Final    BUN/Creatinine ratio 01/25/2017 12  12 - 20   Final    GFR est AA 01/25/2017 >60  >60 ml/min/1.73m2 Final    GFR est non-AA 01/25/2017 >60  >60 ml/min/1.73m2 Final    Calcium 01/25/2017 8.8  8.5 - 10.1 MG/DL Final    WBC 01/25/2017 7.3  3.6 - 11.0 K/uL Final    RBC 01/25/2017 4.54  3.80 - 5.20 M/uL Final    HGB 01/25/2017 12.6  11.5 - 16.0 g/dL Final    HCT 01/25/2017 38.1  35.0 - 47.0 % Final    MCV 01/25/2017 83.9  80.0 - 99.0 FL Final    MCH 01/25/2017 27.8  26.0 - 34.0 PG Final    MCHC 01/25/2017 33.1  30.0 - 36.5 g/dL Final    RDW 01/25/2017 15.5* 11.5 - 14.5 % Final    PLATELET 63/01/8371 860 150 - 400 K/uL Final        RADIOLOGY REPORTS:    Results from Hospital Encounter encounter on 01/24/17   XR CHEST PORT   Narrative Indication: Altered mental status, not eating or drinking for 3 days    Comparison: 3/19/2015    Portable exam of the chest obtained at 1012 demonstrates normal heart size. There is no acute process in the lung fields. The osseous structures are  unremarkable. Impression Impression: No acute process. Ct Head Wo Cont    Result Date: 1/24/2017  EXAM:  CT HEAD WO CONT INDICATION:   Altered mental status COMPARISON: None. TECHNIQUE: Unenhanced CT of the head was performed using 5 mm images. Brain and bone windows were generated. CT dose reduction was achieved through use of a standardized protocol tailored for this examination and automatic exposure control for dose modulation. FINDINGS: The ventricles and sulci are normal in size, shape and configuration and midline. There is no significant white matter disease. There is no intracranial hemorrhage, extra-axial collection, mass, mass effect or midline shift. The basilar cisterns are open. No acute infarct is identified. The bone windows demonstrate no abnormalities. The visualized portions of the paranasal sinuses and mastoid air cells are clear. IMPRESSION: No acute abnormality identified. Xr Chest Port    Result Date: 1/24/2017  Indication: Altered mental status, not eating or drinking for 3 days Comparison: 3/19/2015 Portable exam of the chest obtained at 1012 demonstrates normal heart size. There is no acute process in the lung fields. The osseous structures are unremarkable. Impression: No acute process.               MEDICATIONS       ALL MEDICATIONS  Current Facility-Administered Medications   Medication Dose Route Frequency    cholecalciferol (VITAMIN D3) tablet 1,000 Units  1,000 Units Oral DAILY    famotidine (PEPCID) tablet 20 mg  20 mg Oral BID    fenofibrate nanocrystallized (TRICOR) tablet 145 mg 145 mg Oral Q48H    fluorometholone (FML) 0.1 % ophthalmic suspension 1 Drop  1 Drop Both Eyes BID    fluticasone (FLONASE) 50 mcg/actuation nasal spray 2 Spray  2 Spray Both Nostrils BID    lactobac ac& pc-s.therm-b.anim (THEO Q/RISAQUAD)  1 Cap Oral DAILY    LORazepam (ATIVAN) tablet 2 mg  2 mg Oral TID    sodium chloride (NS) flush 5-10 mL  5-10 mL IntraVENous Q8H    sodium chloride (NS) flush 5-10 mL  5-10 mL IntraVENous PRN    enoxaparin (LOVENOX) injection 40 mg  40 mg SubCUTAneous Q24H    albuterol-ipratropium (DUO-NEB) 2.5 MG-0.5 MG/3 ML  3 mL Nebulization Q4H PRN    morphine injection 2 mg  2 mg IntraVENous Q4H PRN    vitamin E acetate capsule 400 Units  400 Units Oral DAILY      SCHEDULED MEDICATIONS  Current Facility-Administered Medications   Medication Dose Route Frequency    cholecalciferol (VITAMIN D3) tablet 1,000 Units  1,000 Units Oral DAILY    famotidine (PEPCID) tablet 20 mg  20 mg Oral BID    fenofibrate nanocrystallized (TRICOR) tablet 145 mg  145 mg Oral Q48H    fluorometholone (FML) 0.1 % ophthalmic suspension 1 Drop  1 Drop Both Eyes BID    fluticasone (FLONASE) 50 mcg/actuation nasal spray 2 Spray  2 Spray Both Nostrils BID    lactobac ac& pc-s.therm-b.anim (THEO Q/RISAQUAD)  1 Cap Oral DAILY    LORazepam (ATIVAN) tablet 2 mg  2 mg Oral TID    sodium chloride (NS) flush 5-10 mL  5-10 mL IntraVENous Q8H    enoxaparin (LOVENOX) injection 40 mg  40 mg SubCUTAneous Q24H    vitamin E acetate capsule 400 Units  400 Units Oral DAILY                ASSESSMENT & PLAN        The patient Erik Fernandez is a 63 y.o.  female who presents at this time for treatment of the following diagnoses:  Patient Active Hospital Problem List:   Altered mental status (1/24/2017)    Assessment: Dx of polysubstance abuse.   Clearing some from yesterday but not near her baseline which shows normal cognitive function    Plan: Continue supportive care, watch for benzodiazepine withdrawal   Delirium (1/24/2017)    Assessment: improving but still signs today with illusions, confusion    Plan: supportive care   Hyponatremia (1/24/2017)    Assessment: as per medical team    Plan: as above  Bipolar disorder:    A/P: When over delirium and confusion can restart quetiapine. Will not restart lorazepam as she have been detoxed by not having access for a little while before admission. Most likely will not need transfer to Psychiatry. Coordinate with family, patient, and Selma & Zoie about best living situation        Strengths:        A coordinated, multidisplinary treatment team round was conducted with the patient; that includes the nurse, unit pharmcist,  and writer all present. The following regarding medications was addressed during rounds with patient:   the risks and benefits of the proposed medication. The patient was given the opportunity to ask questions. Informed consent given to the use of the above medications. I will continue to adjust psychiatric and non-psychiatric medications (see above \"medication\" section and orders section for details) as deemed appropriate & based upon diagnoses and response to treatment. I have reviewed admission (and previous/old) labs and medical tests in the EHR and or transferring hospital documents. I will continue to order blood tests/labs and diagnostic tests as deemed appropriate and review results as they become available (see orders for details). I have reviewed old psychiatric and medical records available in the EHR. I Will order additional psychiatric records from other institutions to further elucidate the nature of patient's psychopathology and review once available. I will gather additional collateral information from friends, family and o/p treatment team to further elucidate the nature of patient's psychopathology and baselline level of psychiatric functioning.                      SIGNED:    Michelle Wood MD  1/25/2017 DISPLAY PLAN FREE TEXT

## 2019-07-02 NOTE — CONSULT NOTE ADULT - ASSESSMENT
77Y/O M hx of Prostate ca (dx 20 years ago) no chemo/RT in past but with recently noted rising PSA, been follow up with Dr. Dhillon.  Eczema poorly responsive to topical emollients s/p oral steroid taper 5/2019, Zenker's diverticulum c/b dysphagia and intermittent aspiration, recent admission to [facility] for facial cellulitis in setting eczema s/p IV abx, upon d/c dx with probable facial zoster, now presents with 3 weeks of B/L flank pain and progressive lethargy. The flank pain on and off, states urinary hesitancy, unable to empty bladder. and urinary incontinence/leaking. Denies hematuria.  Denies cp/sob/n/v/d/dysuria/cough/uri sx. Denies abd pain, back pain, denies HA/visual changes. Had CT at ED, show b/l mild Hydronephrosis, with full bladder.  ED placed Chang, more than 1L red urine draining out.    PVR >1L 20Fr 6-eye chang placed, Irrigated to light red with 400cc of clot return. Chang replaced with 20Fr 3-way with 10cc in balloon. Start CBI. Procedure done under sterile technique and Chang secured with stat lock. Pt tolerated well.    Plan  - cont. CBI 79Y/O M hx of Prostate ca (dx 20 years ago) no chemo/RT presenting with urinary retention, hematuria. CT scan shows bilateral mild hydronephrosis. Cr elevated to 10.    PVR >1L 20Fr 6-eye chang placed, Irrigated to light red with 400cc of clot return. Chang replaced with 20Fr 3-way with 10cc in balloon.   CBI initiated.    Plan  - cont. CBI   - Monitor urine color, irrigate prn  - Obtain urine culture  - will discuss with attending

## 2019-07-02 NOTE — CONSULT NOTE ADULT - ASSESSMENT
78 year old male with history of prostate cancer (diagnosed in ~2000 without intervention), Zenkers Diverticulum, COPD, recent admission for shingles who presents to the hospital with inability to urinate secondary to obstruction 2/2 prostate cancer.

## 2019-07-02 NOTE — H&P ADULT - NSICDXPASTMEDICALHX_GEN_ALL_CORE_FT
PAST MEDICAL HISTORY:  Chronic obstructive asthma     Eczema     Prostate CA no intervention    Shingles 03/2019

## 2019-07-02 NOTE — H&P ADULT - PROBLEM SELECTOR PLAN 1
-SCR >10, b/l ~ 1.0. evidence of obstruction on exam (distended abd, 1L urine retained and relief of sx post chang). FeNA 4.9% suggestive of post obstructive uropathy.  -trend cmp, maintain chang  -avoid nephrotoxins  -K hemolyzed, repeat 5.2, trend lytes, may need hyperK cocktail. EKG reviewed no Peaked T  -strict i/o  -CT reviewed, b/l hydro + bladder thickening ?stone vs. obstructive uropathy from progressive prostate ca. UA not c/w uti, no dysuria, afebrile no leukocytosis, observe off abx.   -renal consulted by ER, f/u recs; currently non-oliguric, no evidence of fluid overload, no acidosis, lytes borderline but ok.   -f/u urology consult as noted below  -c/w ivf

## 2019-07-02 NOTE — CONSULT NOTE ADULT - SUBJECTIVE AND OBJECTIVE BOX
77Y/O M hx of Prostate ca (dx 20 years ago) no chemo/RT in past but with recently noted rising PSA, been follow up with Dr. Dhillon.  Eczema poorly responsive to topical emollients s/p oral steroid taper 2019, Zenker's diverticulum c/b dysphagia and intermittent aspiration, recent admission to [facility] for facial cellulitis in setting eczema s/p IV abx, upon d/c dx with probable facial zoster, now presents with 3 weeks of B/L flank pain and progressive lethargy. The flank pain on and off, states urinary hesitancy, unable to empty bladder. and urinary incontinence/leaking. Denies hematuria.  Denies cp/sob/n/v/d/dysuria/cough/uri sx. Denies abd pain, back pain, denies HA/visual changes. Had CT at ED, show b/l mild Hydronephrosis, with full bladder. ED placed Lopez, more than 1L red urine draining out.      PAST MEDICAL & SURGICAL HISTORY:  Shingles: 2019  Chronic obstructive asthma  Prostate CA: no intervention  Eczema  No significant past surgical history    FAMILY HISTORY:  No pertinent family history in first degree relatives    SOCIAL HISTORY:   Tobacco hx:  albuterol (Unknown)      REVIEW OF SYSTEMS: Pertinent positives and negatives as stated in HPI, otherwise negative    Vital signs      Output      Physical Exam  Gen: NAD  Pulm: No respiratory distress, no subcostal retractions  CV: RRR, no JVD  Abd: Soft, NT, ND  Back: No CVAT b/l  : Circumcised, Lopez in place, draining dark red urine.  MSK: No edema present    LABS:         @ 01:53    WBC --    / Hct --    / SCr 10.50     @ 00:19    WBC 10.1  / Hct 43.2  / SCr 10.74        143  |  101  |  68<H>  ----------------------------<  141<H>  5.2   |  22  |  10.50<H>    Ca    9.1      2019 01:53    TPro  7.8  /  Alb  3.3  /  TBili  0.5  /  DBili  x   /  AST  38  /  ALT  12  /  AlkPhos  54        Urinalysis Basic - ( 2019 00:25 )    Color: Light Yellow / Appearance: Clear / S.015 / pH: x  Gluc: x / Ketone: Negative  / Bili: Negative / Urobili: Negative   Blood: x / Protein: 30 mg/dL / Nitrite: Negative   Leuk Esterase: Negative / RBC: 2 /hpf / WBC 2 /HPF   Sq Epi: x / Non Sq Epi: 0 / Bacteria: Negative        Urine Cx:   Blood Cx:    Radiology:  < from: CT Abdomen and Pelvis No Cont (19 @ 23:36) >  ladder wall thickening and mild bilateral hydroureteronephrosis,   which may be related to prostatic hypertrophy and/or infection. No   obstructing stones identified.  Asymmetric right perinephric stranding and moderate right perinephric   fluid, which may reflect infection or recently passed stone. Given degree   of right perinephric fluid, calyceal rupture should be considered.  2. Right base lung nodules measuring up to 3 mm. Comparison with prior   imaging is recommended if available to assess for stability. Otherwise a   follow-up CT in 12 months can be obtained if the patient has risk factors   for malignancy.    < end of copied text > 77Y/O M hx of Prostate CA (dx 20 years ago) now presents with 3 weeks of B/L flank pain and progressive lethargy. The flank pain is intermittent and associated with urinary hesitancy and sensation of incomplete bladder emptying. He denies any hematuria, dysuria, fevers, chills, nausea or vomiting. In the ED, patient had a CT scan which showed b/l mild Hydronephrosis with full bladder. ED placed chang, more than 1L red urine draining out. Urology was consulted.    Patient follows with Dr. Dhillon as an outpatient. He has known prostate CA with a continually rising PSA. His most recent level was 113 from 88. Patient has continually refused intervention or workup such as CT or bone scans. He has been asymptomatic from his cancer.      PAST MEDICAL & SURGICAL HISTORY:  Shingles: 2019  Chronic obstructive asthma  Prostate CA: no intervention  Eczema  No significant past surgical history    FAMILY HISTORY:  No pertinent family history in first degree relatives    SOCIAL HISTORY:   Tobacco hx:  albuterol (Unknown)      REVIEW OF SYSTEMS: Pertinent positives and negatives as stated in HPI, otherwise negative    Vital signs      Output      Physical Exam  Gen: NAD  Pulm: No respiratory distress, no subcostal retractions  CV: RRR, no JVD  Abd: Soft, NT, ND  Back: No CVAT b/l  : Circumcised, Chang in place, draining dark red urine.  MSK: No edema present    LABS:         @ 01:53    WBC --    / Hct --    / SCr 10.50     @ 00:19    WBC 10.1  / Hct 43.2  / SCr 10.74        143  |  101  |  68<H>  ----------------------------<  141<H>  5.2   |  22  |  10.50<H>    Ca    9.1      2019 01:53    TPro  7.8  /  Alb  3.3  /  TBili  0.5  /  DBili  x   /  AST  38  /  ALT  12  /  AlkPhos  54        Urinalysis Basic - ( 2019 00:25 )    Color: Light Yellow / Appearance: Clear / S.015 / pH: x  Gluc: x / Ketone: Negative  / Bili: Negative / Urobili: Negative   Blood: x / Protein: 30 mg/dL / Nitrite: Negative   Leuk Esterase: Negative / RBC: 2 /hpf / WBC 2 /HPF   Sq Epi: x / Non Sq Epi: 0 / Bacteria: Negative        Urine Cx:   Blood Cx:    Radiology:  < from: CT Abdomen and Pelvis No Cont (19 @ 23:36) >  ladder wall thickening and mild bilateral hydroureteronephrosis,   which may be related to prostatic hypertrophy and/or infection. No   obstructing stones identified.  Asymmetric right perinephric stranding and moderate right perinephric   fluid, which may reflect infection or recently passed stone. Given degree   of right perinephric fluid, calyceal rupture should be considered.  2. Right base lung nodules measuring up to 3 mm. Comparison with prior   imaging is recommended if available to assess for stability. Otherwise a   follow-up CT in 12 months can be obtained if the patient has risk factors   for malignancy.    < end of copied text >

## 2019-07-02 NOTE — H&P ADULT - PROBLEM SELECTOR PLAN 7
given ?calcyeal rupture and hematuria, holding pharm vte ppx  -start scd. -known hx, being followed by house ENT Dr. Moya, has already received cardiac optimization/clearance for procedure, but pt is still actively considering procedure.  -NPO for possible urologic intervention, if diet restarted should be empiric dysphagia diet  -aspiration precautions.

## 2019-07-02 NOTE — H&P ADULT - NSHPLABSRESULTS_GEN_ALL_CORE
Labs personally reviewed : no leukocytosis, hgb 14.6 at baseline on admission, plt wnl, rest cbc unrevealing, initial K hemolyzed 7.3 repeat 5.2, cmp also showing new VARUN with SCr 10.7 b/l 1.06, AG 21, LFTs wnl, lactate wnl, FeNa 4.9% indicating post obstructive.    Imaging personally reviewed  CTAP showed bladder wall thickening + b/l hydro ?infection vs. passed stone, no obstructing stones, +assymetric R perinephric stranding/perinephric fluid ?calcyeal rupture, and R lung base nodules of unclear chronicity.   EKG personally reviewed NSR no overt ischemia

## 2019-07-02 NOTE — H&P ADULT - HISTORY OF PRESENT ILLNESS
78 M PMH Prostate ca (dx 20 years ago) no chemo/RT in past but with recently noted rising PSA, eczema poorly responsive to topical emollients s/p oral steroid taper 5/2019, Zenker's diverticulum c/b dysphagia and intermittent aspiration, recent admission to 78 M PMH Prostate ca (dx 20 years ago) no chemo/RT in past but with recently noted rising PSA, eczema poorly responsive to topical emollients s/p oral steroid taper 5/2019, Zenker's diverticulum c/b dysphagia and intermittent aspiration, recent admission to [facility] for facial cellulitis in setting eczema s/p IV abx, upon d/c dx with probable facial zoster, now presents with 2 weeks of progressive lethargy. +R flank pain. +urinary hesitancy starting 2 weeks ago, now progressing to urinary incontinence/leaking. Notes low grade temp Tmax at home 99.5. Denies cp/sob/n/v/d/dysuria/cough/uri sx. Denies abd pain, back pain, denies HA/visual changes. Occasional night sweats, no known lymphadenopathy. +intermittent dysphagia related to known moderate Zenker's diverticula.       Per chart review, it appears pt follows closely with urologist (Dr. Dhillon) and dermatologist (Dr. Tsai).  Regarding his prostate cancer, there is documentation in the outpatient record that Dr. Dhillon has had multiple conversations with patient regarding enlargened prostate and growing PSA number (2 -->20s-->88-->113most recently in May). He has consistently refused hormonal therapy, RT, surgery.  He has been told consistently to get repeat scans (bone scans, CT) but pt has refused; he has been told repeatedly of his risk of metastatic converstion. Last scans reportedly 3 yrs ago without mets at that time. Pt currently states he was planning on getting these scans done but couldn't because of his hospitalization for shingles (which happened in March 2019).   Regarding his zoster, it was diagnosed in outpt visit with his dermatologist who doubted initial dx of facial cellulitis; at time of diagnosis, lesions had crusted over, thus no treatment was offerred but pt was advised to start 400 mg tid with extra tab qhs, which he has not yet taken.   Regarding his known Zenker's Diverticula he has followed with ENT Dr. Moya and been offered endoscopic vs open diverticulectomy, for which he has had cardiac optimization and is currently making up his mind whether he wants to proceed.      VS: 98.5, 84, 163/94, 18, 96% RA. Labs: no leukocytosis, hgb 14.6 at baseline on admission, plt wnl, rest cbc unrevealing, initial K hemolyzed 7.3 repeat 5.2, cmp also showing new VARUN with SCr 10.7 b/l 1.06, AG 21, LFTs wnl, lactate wnl, FeNa 4.9% indicating post obstructive. 78 M PMH Prostate ca (dx 20 years ago) no chemo/RT in past but with recently noted rising PSA, eczema poorly responsive to topical emollients s/p oral steroid taper 5/2019, Zenker's diverticulum c/b dysphagia and intermittent aspiration, recent admission to [facility] for facial cellulitis in setting eczema s/p IV abx, upon d/c dx with probable facial zoster, now presents with 2 weeks of progressive lethargy. +R flank pain. +urinary hesitancy starting 2 weeks ago, now progressing to urinary incontinence/leaking. Notes low grade temp Tmax at home 99.5. Denies cp/sob/n/v/d/dysuria/cough/uri sx. Denies abd pain, back pain, denies HA/visual changes. Occasional night sweats, no known lymphadenopathy. +intermittent dysphagia related to known moderate Zenker's diverticula.       Per chart review, it appears pt follows closely with urologist (Dr. Dhillon) and dermatologist (Dr. Tsai).  Regarding his prostate cancer, there is documentation in the outpatient record that Dr. Dhillon has had multiple conversations with patient regarding enlargened prostate and growing PSA number (2 -->20s-->88-->113most recently in May). He has consistently refused hormonal therapy, RT, surgery.  He has been told consistently to get repeat scans (bone scans, CT) but pt has refused; he has been told repeatedly of his risk of metastatic converstion. Last scans reportedly 3 yrs ago without mets at that time. Pt currently states he was planning on getting these scans done but couldn't because of his hospitalization for shingles (which happened in March 2019).   Regarding his zoster, it was diagnosed in outpt visit with his dermatologist who doubted initial dx of facial cellulitis; at time of diagnosis, lesions had crusted over, thus no treatment was offerred but pt was advised to start 400 mg tid with extra tab qhs, which he has not yet taken.   Regarding his known Zenker's Diverticula he has followed with ENT Dr. Moya and been offered endoscopic vs open diverticulectomy, for which he has had cardiac optimization and is currently making up his mind whether he wants to proceed.      VS: 98.5, 84, 163/94, 18, 96% RA. Labs: no leukocytosis, hgb 14.6 at baseline on admission, plt wnl, rest cbc unrevealing, initial K hemolyzed 7.3 repeat 5.2, cmp also showing new VARUN with SCr 10.7 b/l 1.06, AG 21, LFTs wnl, lactate wnl, FeNa 4.9% indicating post obstructive.  Bladder was scanned, >1 L retained, s/p chang draining dark red bloody urine. CTAP showed bladder wall thickening +  b/l 78 M PMH Prostate ca (dx 20 years ago) no chemo/RT in past but with recently noted rising PSA, eczema poorly responsive to topical emollients s/p oral steroid taper 5/2019, Zenker's diverticulum c/b dysphagia and intermittent aspiration, recent admission to [facility] for facial cellulitis in setting eczema s/p IV abx, upon d/c dx with probable facial zoster, now presents with 2 weeks of progressive lethargy. +R flank pain. +urinary hesitancy starting 2 weeks ago, now progressing to urinary incontinence/leaking. Notes low grade temp Tmax at home 99.5. Denies cp/sob/n/v/d/dysuria/cough/uri sx. Denies abd pain, back pain, denies HA/visual changes. Occasional night sweats, no known lymphadenopathy. +intermittent dysphagia related to known moderate Zenker's diverticula.       Per chart review, it appears pt follows closely with urologist (Dr. Dhillon) and dermatologist (Dr. Tsai).  Regarding his prostate cancer, there is documentation in the outpatient record that Dr. Dhillon has had multiple conversations with patient regarding enlargened prostate and growing PSA number (2 -->20s-->88-->113 most recently in May). He has consistently refused hormonal therapy, RT, surgery.  He has been told consistently to get repeat scans (bone scans, CT) but pt has refused; he has been told repeatedly of his risk of metastatic converstion. Last scans reportedly 3 yrs ago without mets at that time. Pt currently states he was planning on getting these scans done but couldn't because of his hospitalization for shingles (which happened in March 2019).   Regarding his zoster, it was diagnosed in outpt visit with his dermatologist who doubted initial dx of facial cellulitis; at time of diagnosis, lesions had crusted over, thus no treatment was offerred but pt was advised to start 400 mg tid with extra tab qhs, which he has not yet taken.   Regarding his known Zenker's Diverticula he has followed with ENT Dr. Moya and been offered endoscopic vs open diverticulectomy, for which he has had cardiac optimization and is currently making up his mind whether he wants to proceed.      VS: 98.5, 84, 163/94, 18, 96% RA. Labs: no leukocytosis, hgb 14.6 at baseline on admission, plt wnl, rest cbc unrevealing, initial K hemolyzed 7.3 repeat 5.2, cmp also showing new VARUN with SCr 10.7 b/l 1.06, AG 21, LFTs wnl, lactate wnl, FeNa 4.9% indicating post obstructive.  Bladder was scanned, >1 L retained, s/p chang draining dark red bloody urine. CTAP showed bladder wall thickening + b/l hydro ?infection vs. passed stone, no obstructing stones, +assymetric R perinephric stranding/perinephric fluid ?calcyeal rupture, and R lung base nodules of unclear chronicity.

## 2019-07-03 DIAGNOSIS — E83.39 OTHER DISORDERS OF PHOSPHORUS METABOLISM: ICD-10-CM

## 2019-07-03 LAB
ANION GAP SERPL CALC-SCNC: 15 MMOL/L — SIGNIFICANT CHANGE UP (ref 5–17)
BUN SERPL-MCNC: 42 MG/DL — HIGH (ref 7–23)
CALCIUM SERPL-MCNC: 8.9 MG/DL — SIGNIFICANT CHANGE UP (ref 8.4–10.5)
CHLORIDE SERPL-SCNC: 101 MMOL/L — SIGNIFICANT CHANGE UP (ref 96–108)
CO2 SERPL-SCNC: 24 MMOL/L — SIGNIFICANT CHANGE UP (ref 22–31)
CREAT SERPL-MCNC: 5.03 MG/DL — HIGH (ref 0.5–1.3)
GLUCOSE SERPL-MCNC: 131 MG/DL — HIGH (ref 70–99)
HCT VFR BLD CALC: 40.8 % — SIGNIFICANT CHANGE UP (ref 39–50)
HGB BLD-MCNC: 13.2 G/DL — SIGNIFICANT CHANGE UP (ref 13–17)
MCHC RBC-ENTMCNC: 31.5 PG — SIGNIFICANT CHANGE UP (ref 27–34)
MCHC RBC-ENTMCNC: 32.4 GM/DL — SIGNIFICANT CHANGE UP (ref 32–36)
MCV RBC AUTO: 97.2 FL — SIGNIFICANT CHANGE UP (ref 80–100)
PLATELET # BLD AUTO: 321 K/UL — SIGNIFICANT CHANGE UP (ref 150–400)
POTASSIUM SERPL-MCNC: 4.8 MMOL/L — SIGNIFICANT CHANGE UP (ref 3.5–5.3)
POTASSIUM SERPL-SCNC: 4.8 MMOL/L — SIGNIFICANT CHANGE UP (ref 3.5–5.3)
RBC # BLD: 4.2 M/UL — SIGNIFICANT CHANGE UP (ref 4.2–5.8)
RBC # FLD: 12.8 % — SIGNIFICANT CHANGE UP (ref 10.3–14.5)
SODIUM SERPL-SCNC: 140 MMOL/L — SIGNIFICANT CHANGE UP (ref 135–145)
WBC # BLD: 12.5 K/UL — HIGH (ref 3.8–10.5)
WBC # FLD AUTO: 12.5 K/UL — HIGH (ref 3.8–10.5)

## 2019-07-03 PROCEDURE — 99232 SBSQ HOSP IP/OBS MODERATE 35: CPT | Mod: GC

## 2019-07-03 NOTE — PROGRESS NOTE ADULT - PROBLEM SELECTOR PLAN 1
due to post renal obstruction in settin prostate cancer   - Baseline Cr 1.0 (04/2019)  - On admission Cr 10..7-->6.82 (after chang) --> 5.0 (7/3)     PLAN:  - c/w bladder decompression w/ chang catheter  - monitor BMP and UOP  - avoid nephrotoxic agents, renally dose all medications  - maintain adequate perfusion pressures  - No indication for urgent dialysis

## 2019-07-03 NOTE — PROGRESS NOTE ADULT - ASSESSMENT
77Y/O M hx of Prostate ca (dx 20 years ago) pt declined chemo/RT presenting with urinary retention, hematuria. CT scan shows bilateral mild hydronephrosis. VARUN improving.  Ucx negative    Plan  - cont. CBI   - Monitor urine color, irrigate prn  - Trend Scr

## 2019-07-03 NOTE — PROGRESS NOTE ADULT - ASSESSMENT
78 year old male with history of prostate cancer (diagnosed in ~2000 without intervention), Zenkers Diverticulum, COPD, recent admission for shingles who presents to the hospital with inability to urinate secondary to obstruction 2/2 prostate cancer. s/p chang catheter placement with good improvement of Cr.

## 2019-07-03 NOTE — PROGRESS NOTE ADULT - PROBLEM SELECTOR PLAN 1
-SCR >10, baseline ~ 1.0. evidence of obstruction on exam (distended abd, 1L urine retained and relief of sx post chang). FeNA 4.9% suggestive of post obstructive uropathy.  -trend cmp, maintain chang  -avoid nephrotoxins  -strict i/o  -CT reviewed, b/l hydro + bladder thickening ?stone vs. obstructive uropathy from progressive prostate ca. UA not c/w uti, no dysuria, afebrile no leukocytosis, observe off abx.   -renal consulted by ER, f/u recs; currently non-oliguric, no evidence of fluid overload, no acidosis, lytes borderline but ok.   - urology consult appreciated. CBI in place.   -c/w ivf

## 2019-07-03 NOTE — PROGRESS NOTE ADULT - ASSESSMENT
78 M PMH Prostate ca (dx 20 years ago) no chemo/RT in past but with recently noted rising PSA, eczema poorly responsive to topical emollients s/p oral steroid taper 5/2019, Zenker's diverticulum c/b dysphagia and intermittent aspiration, recent admission to [facility] for facial cellulitis in setting eczema s/p IV abx, upon d/c dx with probable facial zoster, now presents with 2 weeks of progressive lethargy and flank pain, f/w b/l hydro/bladder thickening/R perinephric stranding and hematuria in setting of chang placement for acute urinary retention and labwork showing acute kidney injury 2/2 obstructive uropathy, with concern for metastatic prostate cancer underlying his presentation.

## 2019-07-04 LAB
ANION GAP SERPL CALC-SCNC: 12 MMOL/L — SIGNIFICANT CHANGE UP (ref 5–17)
BUN SERPL-MCNC: 28 MG/DL — HIGH (ref 7–23)
CALCIUM SERPL-MCNC: 9.1 MG/DL — SIGNIFICANT CHANGE UP (ref 8.4–10.5)
CHLORIDE SERPL-SCNC: 103 MMOL/L — SIGNIFICANT CHANGE UP (ref 96–108)
CO2 SERPL-SCNC: 26 MMOL/L — SIGNIFICANT CHANGE UP (ref 22–31)
CREAT SERPL-MCNC: 2.08 MG/DL — HIGH (ref 0.5–1.3)
GLUCOSE SERPL-MCNC: 118 MG/DL — HIGH (ref 70–99)
HCT VFR BLD CALC: 40.4 % — SIGNIFICANT CHANGE UP (ref 39–50)
HGB BLD-MCNC: 13.2 G/DL — SIGNIFICANT CHANGE UP (ref 13–17)
MAGNESIUM SERPL-MCNC: 2.1 MG/DL — SIGNIFICANT CHANGE UP (ref 1.6–2.6)
MCHC RBC-ENTMCNC: 32 PG — SIGNIFICANT CHANGE UP (ref 27–34)
MCHC RBC-ENTMCNC: 32.8 GM/DL — SIGNIFICANT CHANGE UP (ref 32–36)
MCV RBC AUTO: 97.6 FL — SIGNIFICANT CHANGE UP (ref 80–100)
PHOSPHATE SERPL-MCNC: 3 MG/DL — SIGNIFICANT CHANGE UP (ref 2.5–4.5)
PLATELET # BLD AUTO: 343 K/UL — SIGNIFICANT CHANGE UP (ref 150–400)
POTASSIUM SERPL-MCNC: 4.4 MMOL/L — SIGNIFICANT CHANGE UP (ref 3.5–5.3)
POTASSIUM SERPL-SCNC: 4.4 MMOL/L — SIGNIFICANT CHANGE UP (ref 3.5–5.3)
RBC # BLD: 4.13 M/UL — LOW (ref 4.2–5.8)
RBC # FLD: 12.6 % — SIGNIFICANT CHANGE UP (ref 10.3–14.5)
SODIUM SERPL-SCNC: 141 MMOL/L — SIGNIFICANT CHANGE UP (ref 135–145)
WBC # BLD: 10 K/UL — SIGNIFICANT CHANGE UP (ref 3.8–10.5)
WBC # FLD AUTO: 10 K/UL — SIGNIFICANT CHANGE UP (ref 3.8–10.5)

## 2019-07-04 NOTE — PROGRESS NOTE ADULT - PROBLEM SELECTOR PLAN 1
-SCR >10, baseline ~ 1.0. evidence of obstruction on exam (distended abd, 1L urine retained and relief of sx post chang). FeNA 4.9% suggestive of post obstructive uropathy.  -CT reviewed, b/l hydro + bladder thickening ?stone vs. obstructive uropathy from progressive prostate ca. UA not c/w uti, no dysuria, afebrile no leukocytosis, observe off abx.   -renal consulted by ER.   - urology consult appreciated. CBI in place. on IVF  - Cr improving. Cr of 2 today.  - urology f/u for CBI management and prostate Ca management

## 2019-07-04 NOTE — PROGRESS NOTE ADULT - ASSESSMENT
77Y/O M hx of Prostate ca (dx 20 years ago) pt declined chemo/RT presenting with urinary retention, hematuria. CT scan shows bilateral mild hydronephrosis. VARUN improving.  Ucx negative    Plan  - VARUN improving.   - Urine color improving, continue to wean CBI, will plan for clamp trial tomorrow.  - Daily BMP

## 2019-07-04 NOTE — PROGRESS NOTE ADULT - ASSESSMENT
78 M PMH Prostate ca (dx 20 years ago) no chemo/RT in past but with recently noted rising PSA, eczema poorly responsive to topical emollients s/p oral steroid taper 5/2019, Zenker's diverticulum c/b dysphagia and intermittent aspiration, recent admission to [facility] for facial cellulitis in setting eczema s/p IV abx, upon d/c dx with probable facial zoster, now presents with 2 weeks of progressive lethargy and flank pain, f/w b/l hydro/bladder thickening/R perinephric stranding and hematuria in setting of chang placement for acute urinary retention and labwork showing acute kidney injury 2/2 obstructive uropathy, with concern for metastatic prostate cancer underlying his presentation.

## 2019-07-05 LAB
ANION GAP SERPL CALC-SCNC: 10 MMOL/L — SIGNIFICANT CHANGE UP (ref 5–17)
BASOPHILS # BLD AUTO: 0.1 K/UL — SIGNIFICANT CHANGE UP (ref 0–0.2)
BASOPHILS NFR BLD AUTO: 1.2 % — SIGNIFICANT CHANGE UP (ref 0–2)
BUN SERPL-MCNC: 24 MG/DL — HIGH (ref 7–23)
CALCIUM SERPL-MCNC: 8.4 MG/DL — SIGNIFICANT CHANGE UP (ref 8.4–10.5)
CHLORIDE SERPL-SCNC: 103 MMOL/L — SIGNIFICANT CHANGE UP (ref 96–108)
CO2 SERPL-SCNC: 27 MMOL/L — SIGNIFICANT CHANGE UP (ref 22–31)
CREAT SERPL-MCNC: 1.61 MG/DL — HIGH (ref 0.5–1.3)
EOSINOPHIL # BLD AUTO: 0.9 K/UL — HIGH (ref 0–0.5)
EOSINOPHIL NFR BLD AUTO: 8.5 % — HIGH (ref 0–6)
GLUCOSE SERPL-MCNC: 129 MG/DL — HIGH (ref 70–99)
HCT VFR BLD CALC: 39.7 % — SIGNIFICANT CHANGE UP (ref 39–50)
HGB BLD-MCNC: 13.5 G/DL — SIGNIFICANT CHANGE UP (ref 13–17)
LYMPHOCYTES # BLD AUTO: 1.8 K/UL — SIGNIFICANT CHANGE UP (ref 1–3.3)
LYMPHOCYTES # BLD AUTO: 17.3 % — SIGNIFICANT CHANGE UP (ref 13–44)
MCHC RBC-ENTMCNC: 32.7 PG — SIGNIFICANT CHANGE UP (ref 27–34)
MCHC RBC-ENTMCNC: 33.9 GM/DL — SIGNIFICANT CHANGE UP (ref 32–36)
MCV RBC AUTO: 96.6 FL — SIGNIFICANT CHANGE UP (ref 80–100)
MONOCYTES # BLD AUTO: 0.9 K/UL — SIGNIFICANT CHANGE UP (ref 0–0.9)
MONOCYTES NFR BLD AUTO: 8.6 % — SIGNIFICANT CHANGE UP (ref 2–14)
NEUTROPHILS # BLD AUTO: 6.5 K/UL — SIGNIFICANT CHANGE UP (ref 1.8–7.4)
NEUTROPHILS NFR BLD AUTO: 64.5 % — SIGNIFICANT CHANGE UP (ref 43–77)
PLATELET # BLD AUTO: 330 K/UL — SIGNIFICANT CHANGE UP (ref 150–400)
POTASSIUM SERPL-MCNC: 4.3 MMOL/L — SIGNIFICANT CHANGE UP (ref 3.5–5.3)
POTASSIUM SERPL-SCNC: 4.3 MMOL/L — SIGNIFICANT CHANGE UP (ref 3.5–5.3)
RBC # BLD: 4.12 M/UL — LOW (ref 4.2–5.8)
RBC # FLD: 12.6 % — SIGNIFICANT CHANGE UP (ref 10.3–14.5)
SODIUM SERPL-SCNC: 140 MMOL/L — SIGNIFICANT CHANGE UP (ref 135–145)
WBC # BLD: 10.1 K/UL — SIGNIFICANT CHANGE UP (ref 3.8–10.5)
WBC # FLD AUTO: 10.1 K/UL — SIGNIFICANT CHANGE UP (ref 3.8–10.5)

## 2019-07-05 PROCEDURE — 99232 SBSQ HOSP IP/OBS MODERATE 35: CPT | Mod: GC

## 2019-07-05 NOTE — PROGRESS NOTE ADULT - ASSESSMENT
78 year old male with history of prostate cancer (diagnosed in ~2000 without intervention), Zenkers Diverticulum, COPD, recent admission for shingles who presents to the hospital with inability to urinate secondary to obstruction 2/2 prostate cancer. s/p chang catheter placement with good improvement of Cr

## 2019-07-05 NOTE — PROGRESS NOTE ADULT - PROBLEM SELECTOR PLAN 1
due to post renal obstruction in settin prostate cancer   - Baseline Cr 1.0 (04/2019)  - On admission Cr 10..7-->6.82 (after chang) --> 5.0-->2.0-->1.61 (7/5)    PLAN:  - c/w bladder decompression w/ chang catheter  - monitor BMP and UOP  - avoid nephrotoxic agents, renally dose all medications  - maintain adequate perfusion pressures  - No indication for urgent dialysis

## 2019-07-05 NOTE — PHYSICAL THERAPY INITIAL EVALUATION ADULT - PERTINENT HX OF CURRENT PROBLEM, REHAB EVAL
78 M PMH Prostate ca (dx 20 years ago) no chemo/RT in past but with recently noted rising PSA, eczema poorly responsive to topical emollients s/p oral steroid taper 5/2019, Zenker's diverticulum c/b dysphagia and intermittent aspiration, recent admission to [facility] for facial cellulitis in setting eczema s/p IV abx, upon d/c dx with probable facial zoster, now presents with 2 weeks of progressive lethargy and flank pain,

## 2019-07-05 NOTE — PHYSICAL THERAPY INITIAL EVALUATION ADULT - PRECAUTIONS/LIMITATIONS, REHAB EVAL
f/w b/l hydro/bladder thickening/R perinephric stranding and hematuria in setting of chang placement for acute urinary retention and labwork showing acute kidney injury 2/2 obstructive uropathy, with concern for metastatic prostate cancer underlying his presentation. USKidney/Bladder: Bilateral mild hydronephrosis. Urinary retention. CTAbdomen/Pelvis:Bladder wall thickening and mild bilateral hydroureteronephrosis, which may be related to prostatic hypertrophy and/or infection. No obstructing stones identified.Asymmetric right perinephric stranding and moderate right perinephric fluid, which may reflect infection or recently passed stone. Given degree of right perinephric fluid, calyceal rupture should be considered. Right base lung nodules measuring up to 3 mm. f/w b/l hydro/bladder thickening/R perinephric stranding and hematuria in setting of chang placement for acute urinary retention and labwork showing acute kidney injury 2/2 obstructive uropathy, with concern for metastatic prostate cancer underlying his presentation. USKidney/Bladder: Bilateral mild hydronephrosis. Urinary retention. CTAbdomen/Pelvis:Bladder wall thickening and mild bilateral hydroureteronephrosis, which may be related to prostatic hypertrophy and/or infection. No obstructing stones identified.Asymmetric right perinephric stranding and moderate right perinephric fluid, which may reflect infection or recently passed stone. Given degree of right perinephric fluid, calyceal rupture should be considered. Right base lung nodules measuring up to 3 mm./no known precautions/limitations

## 2019-07-05 NOTE — PHYSICAL THERAPY INITIAL EVALUATION ADULT - PLANNED THERAPY INTERVENTIONS, PT EVAL
bed mobility training/gait training/GOAL: Pt will independently negotiate stairs with 1 handrail with step over step pattern in 2wks.

## 2019-07-05 NOTE — PROGRESS NOTE ADULT - ASSESSMENT
77Y/O M hx of Prostate ca (dx 20 years ago) pt declined chemo/RT presenting with urinary retention, hematuria. CT scan shows bilateral mild hydronephrosis. VARUN improving.  Ucx negative    Plan  - VARUN improving.   - Urine color improving  - Monitor urine color today with CBI clamped  - If color remains clear, will plan for TOV/PVR Saturday  - Daily BMP

## 2019-07-05 NOTE — PHYSICAL THERAPY INITIAL EVALUATION ADULT - ADDITIONAL COMMENTS
Pt lives with significant other in private split level home. Pt states independent with all functional mobility prior to admission with no use of AD.

## 2019-07-05 NOTE — PROGRESS NOTE ADULT - PROBLEM SELECTOR PLAN 1
-SCR >10, baseline ~ 1.0. evidence of obstruction on exam (distended abd, 1L urine retained and relief of sx post chang). FeNA 4.9% suggestive of post obstructive uropathy.  -CT reviewed, b/l hydro + bladder thickening ?stone vs. obstructive uropathy from progressive prostate ca. UA not c/w uti, no dysuria, afebrile no leukocytosis, observe off abx.   -renal consulted by ER.   - urology consult appreciated. CBI in place. on IVF  - Cr improving.   - urology f/u for CBI management and prostate Ca management

## 2019-07-06 LAB
ANION GAP SERPL CALC-SCNC: 16 MMOL/L — SIGNIFICANT CHANGE UP (ref 5–17)
BUN SERPL-MCNC: 20 MG/DL — SIGNIFICANT CHANGE UP (ref 7–23)
CALCIUM SERPL-MCNC: 8.7 MG/DL — SIGNIFICANT CHANGE UP (ref 8.4–10.5)
CHLORIDE SERPL-SCNC: 98 MMOL/L — SIGNIFICANT CHANGE UP (ref 96–108)
CO2 SERPL-SCNC: 24 MMOL/L — SIGNIFICANT CHANGE UP (ref 22–31)
CREAT SERPL-MCNC: 1.48 MG/DL — HIGH (ref 0.5–1.3)
GLUCOSE SERPL-MCNC: 181 MG/DL — HIGH (ref 70–99)
POTASSIUM SERPL-MCNC: 4.4 MMOL/L — SIGNIFICANT CHANGE UP (ref 3.5–5.3)
POTASSIUM SERPL-SCNC: 4.4 MMOL/L — SIGNIFICANT CHANGE UP (ref 3.5–5.3)
SODIUM SERPL-SCNC: 138 MMOL/L — SIGNIFICANT CHANGE UP (ref 135–145)

## 2019-07-06 RX ORDER — TAMSULOSIN HYDROCHLORIDE 0.4 MG/1
0.4 CAPSULE ORAL AT BEDTIME
Refills: 0 | Status: DISCONTINUED | OUTPATIENT
Start: 2019-07-06 | End: 2019-07-11

## 2019-07-06 RX ADMIN — TAMSULOSIN HYDROCHLORIDE 0.4 MILLIGRAM(S): 0.4 CAPSULE ORAL at 21:12

## 2019-07-06 NOTE — PROGRESS NOTE ADULT - ASSESSMENT
78 M PMH Prostate ca (dx 20 years ago) no chemo/RT in past but with recently noted rising PSA, eczema poorly responsive to topical emollients s/p oral steroid taper 5/2019, Zenker's diverticulum c/b dysphagia and intermittent aspiration, recent admission to [facility] for facial cellulitis in setting eczema s/p IV abx, upon d/c dx with probable facial zoster, now presents with 2 weeks of progressive lethargy and flank pain, f/w b/l hydro/bladder thickening/R perinephric stranding and hematuria in setting of chang placement for acute urinary retention and labwork showing acute kidney injury 2/2 obstructive uropathy, with concern for metastatic prostate cancer underlying his presentation.

## 2019-07-06 NOTE — PROGRESS NOTE ADULT - ASSESSMENT
79Y/O M hx of Prostate ca (dx 20 years ago) pt declined chemo/RT presenting with urinary retention, hematuria. CT scan shows bilateral mild hydronephrosis. VARUN improving.  Ucx negative    Plan  - Cr downtrending, almost normalized, continue to monitor daily BMP  - Urine color improving  - Monitor urine color today with CBI clamped  - If remains clear, trial of void + PVR on Sunday    Urology 79Y/O M hx of Prostate ca (dx 20 years ago) pt declined chemo/RT presenting with urinary retention, hematuria. CT scan shows bilateral mild hydronephrosis. VARUN improving.  Ucx negative    Plan  - Cr downtrending, almost normalized, continue to monitor daily BMP  - Urine color improving  - Monitor urine color today with CBI clamped  - If remains clear, trial of void + PVR on Sunday  - Can start flomax 0.4mg qhs    Urology

## 2019-07-07 LAB
ANION GAP SERPL CALC-SCNC: 11 MMOL/L — SIGNIFICANT CHANGE UP (ref 5–17)
BUN SERPL-MCNC: 18 MG/DL — SIGNIFICANT CHANGE UP (ref 7–23)
CALCIUM SERPL-MCNC: 8.7 MG/DL — SIGNIFICANT CHANGE UP (ref 8.4–10.5)
CHLORIDE SERPL-SCNC: 100 MMOL/L — SIGNIFICANT CHANGE UP (ref 96–108)
CO2 SERPL-SCNC: 28 MMOL/L — SIGNIFICANT CHANGE UP (ref 22–31)
CREAT SERPL-MCNC: 1.44 MG/DL — HIGH (ref 0.5–1.3)
CULTURE RESULTS: SIGNIFICANT CHANGE UP
CULTURE RESULTS: SIGNIFICANT CHANGE UP
GLUCOSE SERPL-MCNC: 138 MG/DL — HIGH (ref 70–99)
POTASSIUM SERPL-MCNC: 3.9 MMOL/L — SIGNIFICANT CHANGE UP (ref 3.5–5.3)
POTASSIUM SERPL-SCNC: 3.9 MMOL/L — SIGNIFICANT CHANGE UP (ref 3.5–5.3)
SODIUM SERPL-SCNC: 139 MMOL/L — SIGNIFICANT CHANGE UP (ref 135–145)
SPECIMEN SOURCE: SIGNIFICANT CHANGE UP
SPECIMEN SOURCE: SIGNIFICANT CHANGE UP

## 2019-07-07 RX ADMIN — TAMSULOSIN HYDROCHLORIDE 0.4 MILLIGRAM(S): 0.4 CAPSULE ORAL at 21:58

## 2019-07-07 NOTE — PROGRESS NOTE ADULT - ASSESSMENT
78 M PMH Prostate ca (dx 20 years ago) no chemo/RT in past but with recently noted rising PSA, eczema poorly responsive to topical emollients s/p oral steroid taper 5/2019, Zenker's diverticulum c/b dysphagia and intermittent aspiration, recent admission to [facility] for facial cellulitis in setting eczema s/p IV abx, upon d/c dx with probable facial zoster, now presents with 2 weeks of progressive lethargy and flank pain, f/w b/l hydro/bladder thickening/R perinephric stranding and hematuria in setting of chang placement for acute urinary retention and labwork showing acute kidney injury 2/2 obstructive uropathy, with concern for metastatic prostate cancer underlying his presentation.

## 2019-07-07 NOTE — PROGRESS NOTE ADULT - ASSESSMENT
79Y/O M hx of Prostate ca (dx 20 years ago) pt declined chemo/RT presenting with urinary retention, hematuria. CT scan shows bilateral mild hydronephrosis. VARUN improving.  Ucx negative    Plan  - Cr downtrending, almost normalized, continue to monitor daily BMP  - Urine color clear  - CBI clamped; Will recheck in PM  - Possible TOV + PVR later today  - Continue flomax 0.4mg qhs

## 2019-07-07 NOTE — CHART NOTE - NSCHARTNOTEFT_GEN_A_CORE
Patient checked on this afternoon. Urine bloody off of CBI.   CBI restarted at a fast drip.   Will monitor color.

## 2019-07-08 LAB
ANION GAP SERPL CALC-SCNC: 13 MMOL/L — SIGNIFICANT CHANGE UP (ref 5–17)
BUN SERPL-MCNC: 19 MG/DL — SIGNIFICANT CHANGE UP (ref 7–23)
CALCIUM SERPL-MCNC: 8.7 MG/DL — SIGNIFICANT CHANGE UP (ref 8.4–10.5)
CHLORIDE SERPL-SCNC: 100 MMOL/L — SIGNIFICANT CHANGE UP (ref 96–108)
CO2 SERPL-SCNC: 28 MMOL/L — SIGNIFICANT CHANGE UP (ref 22–31)
CREAT SERPL-MCNC: 1.39 MG/DL — HIGH (ref 0.5–1.3)
GLUCOSE SERPL-MCNC: 135 MG/DL — HIGH (ref 70–99)
HCT VFR BLD CALC: 37.5 % — LOW (ref 39–50)
HGB BLD-MCNC: 12.5 G/DL — LOW (ref 13–17)
MCHC RBC-ENTMCNC: 31.9 PG — SIGNIFICANT CHANGE UP (ref 27–34)
MCHC RBC-ENTMCNC: 33.2 GM/DL — SIGNIFICANT CHANGE UP (ref 32–36)
MCV RBC AUTO: 96.1 FL — SIGNIFICANT CHANGE UP (ref 80–100)
PLATELET # BLD AUTO: 314 K/UL — SIGNIFICANT CHANGE UP (ref 150–400)
POTASSIUM SERPL-MCNC: 4.1 MMOL/L — SIGNIFICANT CHANGE UP (ref 3.5–5.3)
POTASSIUM SERPL-SCNC: 4.1 MMOL/L — SIGNIFICANT CHANGE UP (ref 3.5–5.3)
RBC # BLD: 3.9 M/UL — LOW (ref 4.2–5.8)
RBC # FLD: 12.4 % — SIGNIFICANT CHANGE UP (ref 10.3–14.5)
SODIUM SERPL-SCNC: 141 MMOL/L — SIGNIFICANT CHANGE UP (ref 135–145)
WBC # BLD: 9.2 K/UL — SIGNIFICANT CHANGE UP (ref 3.8–10.5)
WBC # FLD AUTO: 9.2 K/UL — SIGNIFICANT CHANGE UP (ref 3.8–10.5)

## 2019-07-08 RX ORDER — SODIUM CHLORIDE 9 MG/ML
1000 INJECTION INTRAMUSCULAR; INTRAVENOUS; SUBCUTANEOUS
Refills: 0 | Status: DISCONTINUED | OUTPATIENT
Start: 2019-07-08 | End: 2019-07-10

## 2019-07-08 RX ORDER — SENNA PLUS 8.6 MG/1
2 TABLET ORAL AT BEDTIME
Refills: 0 | Status: DISCONTINUED | OUTPATIENT
Start: 2019-07-08 | End: 2019-07-11

## 2019-07-08 RX ORDER — DOCUSATE SODIUM 100 MG
100 CAPSULE ORAL
Refills: 0 | Status: DISCONTINUED | OUTPATIENT
Start: 2019-07-08 | End: 2019-07-11

## 2019-07-08 RX ADMIN — TAMSULOSIN HYDROCHLORIDE 0.4 MILLIGRAM(S): 0.4 CAPSULE ORAL at 21:39

## 2019-07-08 NOTE — PROGRESS NOTE ADULT - ASSESSMENT
78 M PMH Prostate ca (dx 20 years ago) no chemo/RT in past but with recently noted rising PSA, eczema poorly responsive to topical emollients s/p oral steroid taper 5/2019, Zenker's diverticulum c/b dysphagia and intermittent aspiration, recent admission to [facility] for facial cellulitis in setting eczema s/p IV abx, upon d/c dx with probable facial zoster, now presents with 2 weeks of progressive lethargy and flank pain, f/w b/l hydro/bladder thickening/R perinephric stranding and hematuria in setting of chang placement for acute urinary retention and labwork showing acute kidney injury 2/2 obstructive uropathy, with concern for metastatic prostate cancer underlying his presentation.

## 2019-07-08 NOTE — PROGRESS NOTE ADULT - ASSESSMENT
77Y/O M hx of Prostate ca (dx 20 years ago) pt declined chemo/RT presenting with urinary retention, hematuria. CT scan shows bilateral mild hydronephrosis. VARUN improving.  Ucx negative    Plan  - Cr downtrending, almost normalized, continue to monitor daily BMP  - Urine color clear  - CBI clamped; Will recheck in evening  - Possible TOV + PVR tomorrow   - Continue flomax 0.4mg qhs  - Add bowel regimen (colace & senna)

## 2019-07-08 NOTE — PROGRESS NOTE ADULT - PROBLEM SELECTOR PLAN 1
-SCR >10, baseline ~ 1.0. evidence of obstruction on exam (distended abd, 1L urine retained and relief of sx post chang). FeNA 4.9% suggestive of post obstructive uropathy.  -CT reviewed, b/l hydro + bladder thickening ?stone vs. obstructive uropathy from progressive prostate ca. UA not c/w uti, no dysuria, afebrile no leukocytosis, observe off abx.   -renal consulted by ER.   - urology consult appreciated. CBI in place. on IVF  - Cr improving.   - urology f/u for CBI management and prostate Ca management -SCR >10, baseline ~ 1.0. evidence of obstruction on exam (distended abd, 1L urine retained and relief of sx post chang). FeNA 4.9% suggestive of post obstructive uropathy.  -CT reviewed, b/l hydro + bladder thickening ?stone vs. obstructive uropathy from progressive prostate ca. UA not c/w uti, no dysuria, afebrile no leukocytosis, observe off abx.   -renal consulted by ER.   - urology consult appreciated. CBI in place. on IVF  - Cr improving.   - urology f/u for CBI management and prostate Ca management  - Possible TOV + PVR tomorrow per urology

## 2019-07-09 LAB
ANION GAP SERPL CALC-SCNC: 16 MMOL/L — SIGNIFICANT CHANGE UP (ref 5–17)
BUN SERPL-MCNC: 20 MG/DL — SIGNIFICANT CHANGE UP (ref 7–23)
CALCIUM SERPL-MCNC: 9 MG/DL — SIGNIFICANT CHANGE UP (ref 8.4–10.5)
CHLORIDE SERPL-SCNC: 98 MMOL/L — SIGNIFICANT CHANGE UP (ref 96–108)
CO2 SERPL-SCNC: 26 MMOL/L — SIGNIFICANT CHANGE UP (ref 22–31)
CREAT SERPL-MCNC: 1.4 MG/DL — HIGH (ref 0.5–1.3)
GLUCOSE SERPL-MCNC: 173 MG/DL — HIGH (ref 70–99)
HCT VFR BLD CALC: 39.1 % — SIGNIFICANT CHANGE UP (ref 39–50)
HGB BLD-MCNC: 12.8 G/DL — LOW (ref 13–17)
MCHC RBC-ENTMCNC: 31.1 PG — SIGNIFICANT CHANGE UP (ref 27–34)
MCHC RBC-ENTMCNC: 32.7 GM/DL — SIGNIFICANT CHANGE UP (ref 32–36)
MCV RBC AUTO: 95.1 FL — SIGNIFICANT CHANGE UP (ref 80–100)
PLATELET # BLD AUTO: 330 K/UL — SIGNIFICANT CHANGE UP (ref 150–400)
POTASSIUM SERPL-MCNC: 4.3 MMOL/L — SIGNIFICANT CHANGE UP (ref 3.5–5.3)
POTASSIUM SERPL-SCNC: 4.3 MMOL/L — SIGNIFICANT CHANGE UP (ref 3.5–5.3)
RBC # BLD: 4.11 M/UL — LOW (ref 4.2–5.8)
RBC # FLD: 12.5 % — SIGNIFICANT CHANGE UP (ref 10.3–14.5)
SODIUM SERPL-SCNC: 140 MMOL/L — SIGNIFICANT CHANGE UP (ref 135–145)
WBC # BLD: 9.9 K/UL — SIGNIFICANT CHANGE UP (ref 3.8–10.5)
WBC # FLD AUTO: 9.9 K/UL — SIGNIFICANT CHANGE UP (ref 3.8–10.5)

## 2019-07-09 NOTE — PROGRESS NOTE ADULT - ASSESSMENT
78 M PMH Prostate ca (dx 20 years ago) no chemo/RT in past but with recently noted rising PSA, eczema poorly responsive to topical emollients s/p oral steroid taper 5/2019, Zenker's diverticulum c/b dysphagia and intermittent aspiration, recent admission to [facility] for facial cellulitis in setting eczema s/p IV abx, upon d/c dx with probable facial zoster, now presents with 2 weeks of progressive lethargy and flank pain, f/w b/l hydro/bladder thickening/R perinephric stranding and hematuria in setting of chang placement for acute urinary retention and labwork showing acute kidney injury 2/2 obstructive uropathy, with concern for metastatic prostate cancer underlying his presentation.

## 2019-07-09 NOTE — PROGRESS NOTE ADULT - PROBLEM SELECTOR PLAN 1
-SCR >10, baseline ~ 1.0. evidence of obstruction on exam (distended abd, 1L urine retained and relief of sx post chang). FeNA 4.9% suggestive of post obstructive uropathy.  -CT reviewed, b/l hydro + bladder thickening ?stone vs. obstructive uropathy from progressive prostate ca. UA not c/w uti, no dysuria, afebrile no leukocytosis, observe off abx.   -renal consulted by ER.   - Cr improving.   - urology consult appreciated. s/p CBI which was removed 7/9 morning.  - TOV in progress.

## 2019-07-09 NOTE — PROCEDURE NOTE - ADDITIONAL PROCEDURE DETAILS
Patient with failed TOV.  In light of VARUN on admission and failed TOV, would advise that patient be discharged with the Lopez catheter as he will need an outlet procedure in order to have a successful/safe TOV in the future.

## 2019-07-09 NOTE — PROCEDURE NOTE - NSURITECHNIQUE_GU_A_CORE
Sterile gloves were worn for the duration of the procedure/The site was cleaned with soap/water and sterile solution (betadine)/The catheter was appropriately lubricated/A sterile drape was used to cover all adjacent areas/The collection bag is below the level of the patient and urinary bladder/Proper hand hygiene was performed/The catheter was secured with a securement device (e.g. StatLock)/The urinary drainage system is closed at the end of the procedure/All applicable medical record documentation is completed

## 2019-07-10 ENCOUNTER — TRANSCRIPTION ENCOUNTER (OUTPATIENT)
Age: 79
End: 2019-07-10

## 2019-07-10 LAB
ANION GAP SERPL CALC-SCNC: 12 MMOL/L — SIGNIFICANT CHANGE UP (ref 5–17)
BUN SERPL-MCNC: 20 MG/DL — SIGNIFICANT CHANGE UP (ref 7–23)
CALCIUM SERPL-MCNC: 9 MG/DL — SIGNIFICANT CHANGE UP (ref 8.4–10.5)
CHLORIDE SERPL-SCNC: 100 MMOL/L — SIGNIFICANT CHANGE UP (ref 96–108)
CO2 SERPL-SCNC: 28 MMOL/L — SIGNIFICANT CHANGE UP (ref 22–31)
CREAT SERPL-MCNC: 1.62 MG/DL — HIGH (ref 0.5–1.3)
GLUCOSE SERPL-MCNC: 130 MG/DL — HIGH (ref 70–99)
POTASSIUM SERPL-MCNC: 4.2 MMOL/L — SIGNIFICANT CHANGE UP (ref 3.5–5.3)
POTASSIUM SERPL-SCNC: 4.2 MMOL/L — SIGNIFICANT CHANGE UP (ref 3.5–5.3)
SODIUM SERPL-SCNC: 140 MMOL/L — SIGNIFICANT CHANGE UP (ref 135–145)

## 2019-07-10 PROCEDURE — 99232 SBSQ HOSP IP/OBS MODERATE 35: CPT | Mod: GC

## 2019-07-10 RX ORDER — SODIUM CHLORIDE 9 MG/ML
1000 INJECTION INTRAMUSCULAR; INTRAVENOUS; SUBCUTANEOUS
Refills: 0 | Status: DISCONTINUED | OUTPATIENT
Start: 2019-07-10 | End: 2019-07-11

## 2019-07-10 RX ADMIN — SODIUM CHLORIDE 50 MILLILITER(S): 9 INJECTION INTRAMUSCULAR; INTRAVENOUS; SUBCUTANEOUS at 21:16

## 2019-07-10 RX ADMIN — TAMSULOSIN HYDROCHLORIDE 0.4 MILLIGRAM(S): 0.4 CAPSULE ORAL at 21:16

## 2019-07-10 NOTE — DISCHARGE NOTE PROVIDER - CARE PROVIDER_API CALL
Vira Dhillon)  Urology  [address] [place] [address], [place] [address]  Phone: [phone]  Fax: [phone]  Follow Up Time:

## 2019-07-10 NOTE — PROGRESS NOTE ADULT - PROBLEM SELECTOR PLAN 4
-check PSA, urology eval, mgt of juancarlos as noted.   - recent outpt PSA has been on the rise and pt was supposed to go for bone scan vs. pan CT vs. PET outpt  -CTAP got portion of R lung base showing nodules, unclear if prior but metastatic prostate ca is on ddx  urology f/u  -as noted in hpi pt has adamantly refused or delayed surveillance scans as well as any offer for hormonal tx/surgery/RT in past as noted by urologist Dr. Dhillon.
- urology eval, mgt of juancarlos as noted.   - recent outpt PSA has been on the rise and pt was supposed to go for bone scan vs. pan CT vs. PET outpt  -CTAP got portion of R lung base showing nodules, unclear if prior but metastatic prostate ca is on ddx  urology f/u  -as noted in hpi pt has adamantly refused or delayed surveillance scans as well as any offer for hormonal tx/surgery/RT in past as noted by urologist Dr. Dhillon.
-check PSA, urology eval, mgt of juancarlos as noted.   - recent outpt PSA has been on the rise and pt was supposed to go for bone scan vs. pan CT vs. PET outpt  -CTAP got portion of R lung base showing nodules, unclear if prior but metastatic prostate ca is on ddx  urology f/u  -as noted in hpi pt has adamantly refused or delayed surveillance scans as well as any offer for hormonal tx/surgery/RT in past as noted by urologist Dr. Dhillon.
- urology eval, mgt of juancarlos as noted.   - recent outpt PSA has been on the rise and pt was supposed to go for bone scan vs. pan CT vs. PET outpt  -CTAP got portion of R lung base showing nodules, unclear if prior but metastatic prostate ca is on ddx  urology f/u  -as noted in hpi pt has adamantly refused or delayed surveillance scans as well as any offer for hormonal tx/surgery/RT in past as noted by urologist Dr. Dhillon.
-check PSA, urology eval, mgt of juancarlos as noted.   -will likely need additional scans, bone scan vs. pan CT vs. PET  -CTAP got portion of R lung base showing nodules, unclear if prior but metastatic prostate ca is on ddx  urology f/u  -as noted in hpi pt has adamantly refused or delayed surveillance scans as well as any offer for hormonal tx/surgery/RT in past as noted by urologist Dr. Dhillon.

## 2019-07-10 NOTE — PROGRESS NOTE ADULT - SUBJECTIVE AND OBJECTIVE BOX
[facility] DIVISION OF KIDNEY DISEASES AND HYPERTENSION -- FOLLOW UP NOTE  --------------------------------------------------------------------------------  Chief Complaint: ARF, obstructive uropathy, hematuria    24 hour events/subjective:  - pt seen and examined at bedside w/o complains  - lab noted improving Cr      PAST HISTORY  --------------------------------------------------------------------------------  No significant changes to PMH, PSH, FHx, SHx, unless otherwise noted    ALLERGIES & MEDICATIONS  --------------------------------------------------------------------------------  Allergies    albuterol (Unknown)    Intolerances      Standing Inpatient Medications  fluocinonide 0.05% Solution 1 Application(s) Topical two times a day  gabapentin 400 milliGRAM(s) Oral three times a day    PRN Inpatient Medications      REVIEW OF SYSTEMS  --------------------------------------------------------------------------------  Gen: No fevers/chills, weakness  Respiratory: No dyspnea  CV: No chest pain  GI: No abdominal pain, diarrhea, constipation, nausea, vomiting  MSK: no edema    All other systems were reviewed and are negative, except as noted.    VITALS/PHYSICAL EXAM  --------------------------------------------------------------------------------  T(C): 36.5 (07-05-19 @ 06:03), Max: 37 (07-04-19 @ 21:08)  HR: 82 (07-05-19 @ 06:03) (82 - 100)  BP: 139/85 (07-05-19 @ 06:03) (129/85 - 141/90)  RR: 18 (07-05-19 @ 06:03) (18 - 18)  SpO2: 94% (07-05-19 @ 06:03) (94% - 95%)  Wt(kg): --        07-04-19 @ 07:01  -  07-05-19 @ 07:00  --------------------------------------------------------  IN: 1040 mL / OUT: 0 mL / NET: 1040 mL      Physical Exam:  	Gen: NAD, well-appearing  	Pulm: CTA B/L  	CV: RRR, S1S2; no rub  	Abd: +BS, soft, nontender/nondistended  	Ext: no edema    LABS/STUDIES  --------------------------------------------------------------------------------              13.5   10.1  >-----------<  330      [07-05-19 @ 06:19]              39.7     140  |  103  |  24  ----------------------------<  129      [07-05-19 @ 06:12]  4.3   |  27  |  1.61        Ca     8.4     [07-05-19 @ 06:12]      Mg     2.1     [07-04-19 @ 06:59]      Phos  3.0     [07-04-19 @ 06:59]            Creatinine Trend:  SCr 1.61 [07-05 @ 06:12]  SCr 2.08 [07-04 @ 06:59]  SCr 5.03 [07-03 @ 07:21]  SCr 6.82 [07-02 @ 09:30]  SCr 10.50 [07-02 @ 01:53]    Urinalysis - [07-02-19 @ 00:25]      Color Light Yellow / Appearance Clear / SG 1.015 / pH 6.0      Gluc Negative / Ketone Negative  / Bili Negative / Urobili Negative       Blood Trace / Protein 30 mg/dL / Leuk Est Negative / Nitrite Negative      RBC 2 / WBC 2 / Hyaline 0 / Gran  / Sq Epi  / Non Sq Epi 0 / Bacteria Negative    Urine Creatinine 93      [07-02-19 @ 01:53]  Urine Protein 107      [07-02-19 @ 01:53]  Urine Sodium 62      [07-02-19 @ 01:53]  Urine Chloride 38      [07-02-19 @ 01:53]  Urine Osmolality 286      [07-02-19 @ 01:53]    HbA1c 6.6      [04-22-19 @ 14:50]
Patient is a 78y old  Male who presents with a chief complaint of ARF, obstructive uropathy, hematuria (06 Jul 2019 11:21)      SUBJECTIVE / OVERNIGHT EVENTS:  No overnight events.  No cp/sob/n/v/d.  No HA/dizziness.  No abdominal pain.   CBI being management by urology      Vital Signs Last 24 Hrs  T(C): 36.7 (06 Jul 2019 17:00), Max: 37.3 (05 Jul 2019 17:45)  T(F): 98 (06 Jul 2019 17:00), Max: 99.1 (05 Jul 2019 17:45)  HR: 86 (06 Jul 2019 17:00) (85 - 103)  BP: 120/80 (06 Jul 2019 17:00) (120/80 - 142/90)  BP(mean): --  RR: 18 (06 Jul 2019 17:00) (16 - 18)  SpO2: 100% (06 Jul 2019 17:00) (94% - 100%)  I&O's Summary    05 Jul 2019 07:01  -  06 Jul 2019 07:00  --------------------------------------------------------  IN: 1560 mL / OUT: 1200 mL / NET: 360 mL    06 Jul 2019 07:01  -  06 Jul 2019 17:28  --------------------------------------------------------  IN: 600 mL / OUT: 0 mL / NET: 600 mL        PHYSICAL EXAM:  GENERAL: NAD, Comfortable  HEAD:  Atraumatic, Normocephalic  EYES: EOMI, PERRLA, conjunctiva and sclera clear  NECK: Supple, No JVD  CHEST/LUNG: Clear to auscultation bilaterally; No wheeze  HEART: Regular rate and rhythm; No murmurs, rubs, or gallops  ABDOMEN: Soft, Nontender, Nondistended; Bowel sounds present  Neuro: AAO x 3, no focal deficit, 5/5 b/l extremities  : CBI in place, pinkish color urine, neg CVAT   EXTREMITIES:  2+ Peripheral Pulses, No clubbing, cyanosis, or edema  SKIN: No rashes or lesions      LABS:                        13.5   10.1  )-----------( 330      ( 05 Jul 2019 06:19 )             39.7     07-06    138  |  98  |  20  ----------------------------<  181<H>  4.4   |  24  |  1.48<H>    Ca    8.7      06 Jul 2019 12:58        CAPILLARY BLOOD GLUCOSE                RADIOLOGY & ADDITIONAL TESTS:    Imaging Personally Reviewed:  [x] YES  [ ] NO    Consultant(s) Notes Reviewed:  [x] YES  [ ] NO      MEDICATIONS  (STANDING):  fluocinonide 0.05% Solution 1 Application(s) Topical two times a day  gabapentin 400 milliGRAM(s) Oral three times a day  tamsulosin 0.4 milliGRAM(s) Oral at bedtime    MEDICATIONS  (PRN):      Care Discussed with Consultants/Other Providers [x] YES  [ ] NO    HEALTH ISSUES - PROBLEM Dx:  Hyperphosphatemia: Hyperphosphatemia  Urinary obstruction: Urinary obstruction  Zenker diverticulum: Zenker diverticulum  Prophylactic measure: Prophylactic measure  Herpes zoster without complication: Herpes zoster without complication  Eczema, unspecified type: Eczema, unspecified type  Prostate cancer: Prostate cancer  Hydronephrosis, unspecified hydronephrosis type: Hydronephrosis, unspecified hydronephrosis type  Obstructive uropathy: Obstructive uropathy  VARUN (acute kidney injury): VARUN (acute kidney injury)
Urology Daily Progress Note    SUBJECTIVE:  Pt seen and examined, and is resting comfortably in bed. No acute events overnight. Pain is adequately controlled on current regimen. Pt has no complaints at this time. Negative Positive for flatus and BM.     OBJECTIVE:  Vital Signs Last 24 Hrs  T(C): 36.7 (09 Jul 2019 21:17), Max: 36.8 (09 Jul 2019 13:30)  T(F): 98.1 (09 Jul 2019 21:17), Max: 98.3 (09 Jul 2019 13:30)  HR: 95 (09 Jul 2019 21:17) (86 - 95)  BP: 152/87 (09 Jul 2019 21:17) (124/79 - 152/87)  BP(mean): --  RR: 18 (09 Jul 2019 21:17) (18 - 18)  SpO2: 95% (09 Jul 2019 21:17) (95% - 98%)    I&O's Detail    08 Jul 2019 07:01  -  09 Jul 2019 07:00  --------------------------------------------------------  IN:    Oral Fluid: 840 mL  Total IN: 840 mL    OUT:    Indwelling Catheter - Urethral: 750 mL  Total OUT: 750 mL    Total NET: 90 mL      09 Jul 2019 07:01  -  10 Jul 2019 06:17  --------------------------------------------------------  IN:    Oral Fluid: 480 mL  Total IN: 480 mL    OUT:    Indwelling Catheter - Urethral: 2300 mL  Total OUT: 2300 mL    Total NET: -1820 mL        Exam:  GEN: A&Ox3, NAD  HEENT: atraumatic, normocephalic  CV: no JVD  RESP: no increased work of breathing, no use of accessory muscles  GI/ABD: soft, NT, ND  : Lopez w/ clear yellow urine  EXTREMITIES: warm, pink, well-perfused                        12.8   9.9   )-----------( 330      ( 09 Jul 2019 07:15 )             39.1       07-09    140  |  98  |  20  ----------------------------<  173<H>  4.3   |  26  |  1.40<H>    Ca    9.0      09 Jul 2019 07:15
[facility] DIVISION OF KIDNEY DISEASES AND HYPERTENSION -- FOLLOW UP NOTE  --------------------------------------------------------------------------------  Chief Complaint: ARF, obstructive uropathy, hematuria    24 hour events/subjective:  - pt seen and examined at bedside, report feeling okay  - still on CBI   - lab noted for improving Cr 10.7 to 6.8 to 5.0      PAST HISTORY  --------------------------------------------------------------------------------  No significant changes to PMH, PSH, FHx, SHx, unless otherwise noted    ALLERGIES & MEDICATIONS  --------------------------------------------------------------------------------  Allergies    albuterol (Unknown)    Intolerances      Standing Inpatient Medications  fluocinonide 0.05% Solution 1 Application(s) Topical two times a day  gabapentin 400 milliGRAM(s) Oral three times a day    PRN Inpatient Medications      REVIEW OF SYSTEMS  --------------------------------------------------------------------------------  Gen: + fatigue.  No weight changes, fevers/chills, weakness  Respiratory: No dyspnea  CV: No chest pain  GI: No abdominal pain, diarrhea, constipation, nausea, vomiting  : + hematuria. no increased frequency, dysuria, nocturia  MSK:  no edema    All other systems were reviewed and are negative, except as noted.    VITALS/PHYSICAL EXAM  --------------------------------------------------------------------------------  T(C): 36.7 (07-03-19 @ 05:02), Max: 37 (07-03-19 @ 01:10)  HR: 97 (07-03-19 @ 07:33) (77 - 97)  BP: 130/84 (07-03-19 @ 07:33) (124/70 - 171/89)  RR: 17 (07-03-19 @ 05:02) (17 - 17)  SpO2: 94% (07-03-19 @ 05:02) (93% - 96%)  Wt(kg): --  Height (cm): 175.26 (07-01-19 @ 18:40)  Weight (kg): 103.4 (07-01-19 @ 18:40)  BMI (kg/m2): 33.7 (07-01-19 @ 18:40)  BSA (m2): 2.18 (07-01-19 @ 18:40)      07-02-19 @ 07:01  -  07-03-19 @ 07:00  --------------------------------------------------------  IN: 930 mL / OUT: 200 mL / NET: 730 mL      Physical Exam:  	Gen: NAD, well-appearing  	HEENT: PERRL, supple neck, clear oropharynx  	Pulm: CTA B/L  	CV: RRR, S1S2; no rub  	Back: No spinal or CVA tenderness; no sacral edema  	Abd: +BS, soft, nontender/nondistended  	: No suprapubic tenderness  	UE: Warm, FROM, no clubbing, intact strength; no edema; no asterixis  	LE: Warm, FROM, no clubbing, intact strength; no edema  	Neuro: No focal deficits, intact gait  	Psych: Normal affect and mood  	Skin: Warm, without rashes  	Vascular access:    LABS/STUDIES  --------------------------------------------------------------------------------              13.2   12.5  >-----------<  321      [07-03-19 @ 07:21]              40.8     140  |  101  |  42  ----------------------------<  131      [07-03-19 @ 07:21]  4.8   |  24  |  5.03        Ca     8.9     [07-03-19 @ 07:21]      Mg     2.5     [07-02-19 @ 09:30]      Phos  5.4     [07-02-19 @ 09:30]    TPro  7.5  /  Alb  3.6  /  TBili  0.6  /  DBili  x   /  AST  14  /  ALT  11  /  AlkPhos  54  [07-02-19 @ 09:30]          Creatinine Trend:  SCr 5.03 [07-03 @ 07:21]  SCr 6.82 [07-02 @ 09:30]  SCr 10.50 [07-02 @ 01:53]  SCr 10.74 [07-02 @ 00:19]    Urinalysis - [07-02-19 @ 00:25]      Color Light Yellow / Appearance Clear / SG 1.015 / pH 6.0      Gluc Negative / Ketone Negative  / Bili Negative / Urobili Negative       Blood Trace / Protein 30 mg/dL / Leuk Est Negative / Nitrite Negative      RBC 2 / WBC 2 / Hyaline 0 / Gran  / Sq Epi  / Non Sq Epi 0 / Bacteria Negative    Urine Creatinine 93      [07-02-19 @ 01:53]  Urine Protein 107      [07-02-19 @ 01:53]  Urine Sodium 62      [07-02-19 @ 01:53]  Urine Chloride 38      [07-02-19 @ 01:53]  Urine Osmolality 286      [07-02-19 @ 01:53]    HbA1c 6.6      [04-22-19 @ 14:50]
Patient is a 78y old  Male who presents with a chief complaint of ARF, obstructive uropathy, hematuria (03 Jul 2019 19:32)      SUBJECTIVE / OVERNIGHT EVENTS:  feels well.  no cp, no sob, no n/v/d. no abdominal pain.  no headache, no dizziness.  Cr coming down.  CBI still pinkish.       Vital Signs Last 24 Hrs  T(C): 36.6 (04 Jul 2019 05:18), Max: 36.8 (03 Jul 2019 20:40)  T(F): 97.8 (04 Jul 2019 05:18), Max: 98.2 (03 Jul 2019 20:40)  HR: 80 (04 Jul 2019 05:18) (80 - 93)  BP: 136/82 (04 Jul 2019 05:18) (124/79 - 138/86)  BP(mean): --  RR: 18 (04 Jul 2019 05:18) (17 - 18)  SpO2: 94% (04 Jul 2019 05:18) (94% - 95%)  I&O's Summary    03 Jul 2019 07:01  -  04 Jul 2019 07:00  --------------------------------------------------------  IN: 1460 mL / OUT: 0 mL / NET: 1460 mL        PHYSICAL EXAM:  GENERAL: NAD, Comfortable  HEAD:  Atraumatic, Normocephalic  EYES: EOMI, PERRLA, conjunctiva and sclera clear  NECK: Supple, No JVD  CHEST/LUNG: Clear to auscultation bilaterally; No wheeze  HEART: Regular rate and rhythm; No murmurs, rubs, or gallops  ABDOMEN: Soft, Nontender, Nondistended; Bowel sounds present  Neuro: AAO x 3, no focal deficit, 5/5 b/l extremities  : CBI in place, pinkish color urine, neg CVAT   EXTREMITIES:  2+ Peripheral Pulses, No clubbing, cyanosis, or edema  SKIN: No rashes or lesions      LABS:                        13.2   10.0  )-----------( 343      ( 04 Jul 2019 06:59 )             40.4     07-04    141  |  103  |  28<H>  ----------------------------<  118<H>  4.4   |  26  |  2.08<H>    Ca    9.1      04 Jul 2019 06:59  Phos  3.0     07-04  Mg     2.1     07-04    TPro  7.5  /  Alb  3.6  /  TBili  0.6  /  DBili  x   /  AST  14  /  ALT  11  /  AlkPhos  54  07-02      CAPILLARY BLOOD GLUCOSE                RADIOLOGY & ADDITIONAL TESTS:    Imaging Personally Reviewed:  [x] YES  [ ] NO    Consultant(s) Notes Reviewed:  [x] YES  [ ] NO      MEDICATIONS  (STANDING):  fluocinonide 0.05% Solution 1 Application(s) Topical two times a day  gabapentin 400 milliGRAM(s) Oral three times a day    MEDICATIONS  (PRN):      Care Discussed with Consultants/Other Providers [x] YES  [ ] NO    HEALTH ISSUES - PROBLEM Dx:  Hyperphosphatemia: Hyperphosphatemia  Urinary obstruction: Urinary obstruction  Zenker diverticulum: Zenker diverticulum  Prophylactic measure: Prophylactic measure  Herpes zoster without complication: Herpes zoster without complication  Eczema, unspecified type: Eczema, unspecified type  Prostate cancer: Prostate cancer  Hydronephrosis, unspecified hydronephrosis type: Hydronephrosis, unspecified hydronephrosis type  Obstructive uropathy: Obstructive uropathy  VARUN (acute kidney injury): VARUN (acute kidney injury)
Patient is a 78y old  Male who presents with a chief complaint of ARF, obstructive uropathy, hematuria (05 Jul 2019 09:23)      SUBJECTIVE / OVERNIGHT EVENTS:  Cr coming down.  CBI still with pink urine.  no cp, no sob, no n/v/d. no abdominal pain.  no headache, no dizziness.   out of bed in chair.       Vital Signs Last 24 Hrs  T(C): 36.8 (05 Jul 2019 14:12), Max: 37 (04 Jul 2019 21:08)  T(F): 98.2 (05 Jul 2019 14:12), Max: 98.6 (04 Jul 2019 21:08)  HR: 89 (05 Jul 2019 14:12) (82 - 100)  BP: 120/80 (05 Jul 2019 14:12) (120/80 - 141/90)  BP(mean): --  RR: 16 (05 Jul 2019 14:12) (16 - 18)  SpO2: 94% (05 Jul 2019 14:12) (94% - 95%)  I&O's Summary    04 Jul 2019 07:01  -  05 Jul 2019 07:00  --------------------------------------------------------  IN: 1040 mL / OUT: 0 mL / NET: 1040 mL    05 Jul 2019 07:01  -  05 Jul 2019 15:16  --------------------------------------------------------  IN: 720 mL / OUT: 1200 mL / NET: -480 mL        PHYSICAL EXAM:  GENERAL: NAD, Comfortable  HEAD:  Atraumatic, Normocephalic  EYES: EOMI, PERRLA, conjunctiva and sclera clear  NECK: Supple, No JVD  CHEST/LUNG: Clear to auscultation bilaterally; No wheeze  HEART: Regular rate and rhythm; No murmurs, rubs, or gallops  ABDOMEN: Soft, Nontender, Nondistended; Bowel sounds present  Neuro: AAO x 3, no focal deficit, 5/5 b/l extremities  : CBI in place, pinkish color urine, neg CVAT   EXTREMITIES:  2+ Peripheral Pulses, No clubbing, cyanosis, or edema  SKIN: No rashes or lesions      LABS:                        13.5   10.1  )-----------( 330      ( 05 Jul 2019 06:19 )             39.7     07-05    140  |  103  |  24<H>  ----------------------------<  129<H>  4.3   |  27  |  1.61<H>    Ca    8.4      05 Jul 2019 06:12  Phos  3.0     07-04  Mg     2.1     07-04        CAPILLARY BLOOD GLUCOSE                RADIOLOGY & ADDITIONAL TESTS:    Imaging Personally Reviewed:  [x] YES  [ ] NO    Consultant(s) Notes Reviewed:  [x] YES  [ ] NO      MEDICATIONS  (STANDING):  fluocinonide 0.05% Solution 1 Application(s) Topical two times a day  gabapentin 400 milliGRAM(s) Oral three times a day    MEDICATIONS  (PRN):      Care Discussed with Consultants/Other Providers [x] YES  [ ] NO    HEALTH ISSUES - PROBLEM Dx:  Hyperphosphatemia: Hyperphosphatemia  Urinary obstruction: Urinary obstruction  Zenker diverticulum: Zenker diverticulum  Prophylactic measure: Prophylactic measure  Herpes zoster without complication: Herpes zoster without complication  Eczema, unspecified type: Eczema, unspecified type  Prostate cancer: Prostate cancer  Hydronephrosis, unspecified hydronephrosis type: Hydronephrosis, unspecified hydronephrosis type  Obstructive uropathy: Obstructive uropathy  VARUN (acute kidney injury): VARUN (acute kidney injury)
Patient is a 78y old  Male who presents with a chief complaint of ARF, obstructive uropathy, hematuria (08 Jul 2019 15:12)      SUBJECTIVE / OVERNIGHT EVENTS:  feels well.  CBI still pink  no cp, no sob, no n/v/d. no abdominal pain.  no headache, no dizziness.   BP soft. IVF started      Vital Signs Last 24 Hrs  T(C): 37.1 (08 Jul 2019 17:05), Max: 37.1 (08 Jul 2019 17:05)  T(F): 98.8 (08 Jul 2019 17:05), Max: 98.8 (08 Jul 2019 17:05)  HR: 100 (08 Jul 2019 17:05) (77 - 103)  BP: 100/66 (08 Jul 2019 17:05) (100/66 - 134/81)  BP(mean): --  RR: 18 (08 Jul 2019 17:05) (18 - 18)  SpO2: 96% (08 Jul 2019 17:05) (94% - 96%)  I&O's Summary    07 Jul 2019 07:01  -  08 Jul 2019 07:00  --------------------------------------------------------  IN: 1440 mL / OUT: 0 mL / NET: 1440 mL    08 Jul 2019 07:01  -  08 Jul 2019 19:40  --------------------------------------------------------  IN: 340 mL / OUT: 0 mL / NET: 340 mL        PHYSICAL EXAM:  GENERAL: NAD, Comfortable  HEAD:  Atraumatic, Normocephalic  EYES: EOMI, PERRLA, conjunctiva and sclera clear  NECK: Supple, No JVD  CHEST/LUNG: Clear to auscultation bilaterally; No wheeze  HEART: Regular rate and rhythm; No murmurs, rubs, or gallops  ABDOMEN: Soft, Nontender, Nondistended; Bowel sounds present  Neuro: AAO x 3, no focal deficit, 5/5 b/l extremities  : CBI in place, pinkish color urine, neg CVAT   EXTREMITIES:  2+ Peripheral Pulses, No clubbing, cyanosis, or edema  SKIN: No rashes or lesions    LABS:                        12.5   9.2   )-----------( 314      ( 08 Jul 2019 07:12 )             37.5     07-08    141  |  100  |  19  ----------------------------<  135<H>  4.1   |  28  |  1.39<H>    Ca    8.7      08 Jul 2019 07:12        CAPILLARY BLOOD GLUCOSE                RADIOLOGY & ADDITIONAL TESTS:    Imaging Personally Reviewed:  [x] YES  [ ] NO    Consultant(s) Notes Reviewed:  [x] YES  [ ] NO      MEDICATIONS  (STANDING):  fluocinonide 0.05% Solution 1 Application(s) Topical two times a day  sodium chloride 0.9%. 1000 milliLiter(s) (75 mL/Hr) IV Continuous <Continuous>  tamsulosin 0.4 milliGRAM(s) Oral at bedtime    MEDICATIONS  (PRN):      Care Discussed with Consultants/Other Providers [x] YES  [ ] NO    HEALTH ISSUES - PROBLEM Dx:  Hyperphosphatemia: Hyperphosphatemia  Urinary obstruction: Urinary obstruction  Zenker diverticulum: Zenker diverticulum  Prophylactic measure: Prophylactic measure  Herpes zoster without complication: Herpes zoster without complication  Eczema, unspecified type: Eczema, unspecified type  Prostate cancer: Prostate cancer  Hydronephrosis, unspecified hydronephrosis type: Hydronephrosis, unspecified hydronephrosis type  Obstructive uropathy: Obstructive uropathy  VARUN (acute kidney injury): VARUN (acute kidney injury)
Patient is a 78y old  Male who presents with a chief complaint of ARF, obstructive uropathy, hematuria (09 Jul 2019 07:59)      SUBJECTIVE / OVERNIGHT EVENTS:  Pt sitting up out of bed.  feels well.   CBI/chang discontinued.  trial of void in progress.  no cp, no sob, no n/v/d. no abdominal pain.  no headache, no dizziness.   able to walk without assistance.       Vital Signs Last 24 Hrs  T(C): 36.8 (09 Jul 2019 13:30), Max: 37 (09 Jul 2019 01:09)  T(F): 98.3 (09 Jul 2019 13:30), Max: 98.6 (09 Jul 2019 01:09)  HR: 86 (09 Jul 2019 13:30) (78 - 95)  BP: 132/74 (09 Jul 2019 13:30) (116/77 - 132/74)  BP(mean): --  RR: 18 (09 Jul 2019 13:30) (17 - 18)  SpO2: 98% (09 Jul 2019 13:30) (93% - 98%)  I&O's Summary    08 Jul 2019 07:01  -  09 Jul 2019 07:00  --------------------------------------------------------  IN: 840 mL / OUT: 750 mL / NET: 90 mL        PHYSICAL EXAM:  GENERAL: NAD, Comfortable  HEAD:  Atraumatic, Normocephalic  EYES: EOMI, PERRLA, conjunctiva and sclera clear  NECK: Supple, No JVD  CHEST/LUNG: Clear to auscultation bilaterally; No wheeze  HEART: Regular rate and rhythm; No murmurs, rubs, or gallops  ABDOMEN: Soft, Nontender, Nondistended; Bowel sounds present  Neuro: AAO x 3, no focal deficit, 5/5 b/l extremities  EXTREMITIES:  2+ Peripheral Pulses, No clubbing, cyanosis, or edema  SKIN: No rashes or lesions    LABS:                        12.8   9.9   )-----------( 330      ( 09 Jul 2019 07:15 )             39.1     07-09    140  |  98  |  20  ----------------------------<  173<H>  4.3   |  26  |  1.40<H>    Ca    9.0      09 Jul 2019 07:15        CAPILLARY BLOOD GLUCOSE                RADIOLOGY & ADDITIONAL TESTS:    Imaging Personally Reviewed:  [x] YES  [ ] NO    Consultant(s) Notes Reviewed:  [x] YES  [ ] NO      MEDICATIONS  (STANDING):  docusate sodium 100 milliGRAM(s) Oral two times a day  fluocinonide 0.05% Solution 1 Application(s) Topical two times a day  senna 2 Tablet(s) Oral at bedtime  sodium chloride 0.9%. 1000 milliLiter(s) (75 mL/Hr) IV Continuous <Continuous>  tamsulosin 0.4 milliGRAM(s) Oral at bedtime    MEDICATIONS  (PRN):      Care Discussed with Consultants/Other Providers [x] YES  [ ] NO    HEALTH ISSUES - PROBLEM Dx:  Hyperphosphatemia: Hyperphosphatemia  Urinary obstruction: Urinary obstruction  Zenker diverticulum: Zenker diverticulum  Prophylactic measure: Prophylactic measure  Herpes zoster without complication: Herpes zoster without complication  Eczema, unspecified type: Eczema, unspecified type  Prostate cancer: Prostate cancer  Hydronephrosis, unspecified hydronephrosis type: Hydronephrosis, unspecified hydronephrosis type  Obstructive uropathy: Obstructive uropathy  VARUN (acute kidney injury): VARUN (acute kidney injury)
Patient is a 78y old  Male who presents with a chief complaint of ARF, obstructive uropathy, hematuria (09 Jul 2019 07:59)      SUBJECTIVE / OVERNIGHT EVENTS:  Pt sitting up out of bed.  feels well.   chang urine is clear light yellow  no cp, no sob, no n/v/d. no abdominal pain.  no headache, no dizziness.   able to walk without assistance.       Vital Signs Last 24 Hrs  T(C): 36.7 (10 Jul 2019 14:12), Max: 36.8 (09 Jul 2019 18:25)  T(F): 98 (10 Jul 2019 14:12), Max: 98.3 (09 Jul 2019 18:25)  HR: 98 (10 Jul 2019 14:12) (76 - 98)  BP: 106/68 (10 Jul 2019 14:12) (106/68 - 152/87)  BP(mean): --  RR: 18 (10 Jul 2019 14:12) (16 - 18)  SpO2: 93% (10 Jul 2019 14:12) (93% - 96%)      PHYSICAL EXAM:  GENERAL: NAD, Comfortable  HEAD:  Atraumatic, Normocephalic  EYES: EOMI, PERRLA, conjunctiva and sclera clear  NECK: Supple, No JVD  CHEST/LUNG: Clear to auscultation bilaterally; No wheeze  HEART: Regular rate and rhythm; No murmurs, rubs, or gallops  ABDOMEN: Soft, Nontender, Nondistended; Bowel sounds present  Neuro: AAO x 3, no focal deficit, 5/5 b/l extremities  EXTREMITIES:  2+ Peripheral Pulses, No clubbing, cyanosis, or edema  SKIN: No rashes or lesions        LABS:                        12.8   9.9   )-----------( 330      ( 09 Jul 2019 07:15 )             39.1     07-10    140  |  100  |  20  ----------------------------<  130<H>  4.2   |  28  |  1.62<H>    Ca    9.0      10 Jul 2019 07:18        CAPILLARY BLOOD GLUCOSE
Patient is a 78y old  Male who presents with a chief complaint of ARF, obstructive uropathy, hematuria (2019 13:59)      SUBJECTIVE / OVERNIGHT EVENTS:  Pt seen and examined at bedside.   No overnight event.  Feeling better.  no cp, no sob, no n/v/d.   Cr slowly coming down.         Vital Signs Last 24 Hrs  T(C): 36.6 (2019 17:20), Max: 37 (2019 01:10)  T(F): 97.9 (2019 17:20), Max: 98.6 (2019 01:10)  HR: 91 (2019 17:20) (77 - 97)  BP: 130/76 (2019 17:20) (124/70 - 171/89)  BP(mean): --  RR: 18 (2019 17:20) (17 - 18)  SpO2: 95% (2019 17:20) (93% - 95%)  I&O's Summary    2019 07:01  -  2019 07:00  --------------------------------------------------------  IN: 930 mL / OUT: 200 mL / NET: 730 mL    2019 07:01  -  2019 19:33  --------------------------------------------------------  IN: 900 mL / OUT: 0 mL / NET: 900 mL        PHYSICAL EXAM:  GENERAL: NAD, Comfortable  HEAD:  Atraumatic, Normocephalic  EYES: EOMI, PERRLA, conjunctiva and sclera clear  NECK: Supple, No JVD  CHEST/LUNG: Clear to auscultation bilaterally; No wheeze  HEART: Regular rate and rhythm; No murmurs, rubs, or gallops  ABDOMEN: Soft, Nontender, Nondistended; Bowel sounds present  Neuro: AAO x 3, no focal deficit, 5/5 b/l extremities  : CBI in place, pinkish color urine, neg CVAT   EXTREMITIES:  2+ Peripheral Pulses, No clubbing, cyanosis, or edema  SKIN: No rashes or lesions    LABS:                        13.2   12.5  )-----------( 321      ( 2019 07:21 )             40.8     07-03    140  |  101  |  42<H>  ----------------------------<  131<H>  4.8   |  24  |  5.03<H>    Ca    8.9      2019 07:21  Phos  5.4     07-  Mg     2.5     07-    TPro  7.5  /  Alb  3.6  /  TBili  0.6  /  DBili  x   /  AST  14  /  ALT  11  /  AlkPhos  54  07-02      CAPILLARY BLOOD GLUCOSE            Urinalysis Basic - ( 2019 00:25 )    Color: Light Yellow / Appearance: Clear / S.015 / pH: x  Gluc: x / Ketone: Negative  / Bili: Negative / Urobili: Negative   Blood: x / Protein: 30 mg/dL / Nitrite: Negative   Leuk Esterase: Negative / RBC: 2 /hpf / WBC 2 /HPF   Sq Epi: x / Non Sq Epi: 0 / Bacteria: Negative        RADIOLOGY & ADDITIONAL TESTS:    Imaging Personally Reviewed:  [x] YES  [ ] NO    Consultant(s) Notes Reviewed:  [x] YES  [ ] NO      MEDICATIONS  (STANDING):  fluocinonide 0.05% Solution 1 Application(s) Topical two times a day  gabapentin 400 milliGRAM(s) Oral three times a day    MEDICATIONS  (PRN):      Care Discussed with Consultants/Other Providers [x] YES  [ ] NO    HEALTH ISSUES - PROBLEM Dx:  Hyperphosphatemia: Hyperphosphatemia  Urinary obstruction: Urinary obstruction  Zenker diverticulum: Zenker diverticulum  Prophylactic measure: Prophylactic measure  Herpes zoster without complication: Herpes zoster without complication  Eczema, unspecified type: Eczema, unspecified type  Prostate cancer: Prostate cancer  Hydronephrosis, unspecified hydronephrosis type: Hydronephrosis, unspecified hydronephrosis type  Obstructive uropathy: Obstructive uropathy  VARUN (acute kidney injury): VARUN (acute kidney injury)
[facility] DIVISION OF KIDNEY DISEASES AND HYPERTENSION -- FOLLOW UP NOTE  --------------------------------------------------------------------------------  Chief Complaint: ARF, obstructive uropathy, hematuria    24 hour events/subjective:  - pt seen and examined at bedside w/o complain  - lab noted stable K       PAST HISTORY  --------------------------------------------------------------------------------  No significant changes to PMH, PSH, FHx, SHx, unless otherwise noted    ALLERGIES & MEDICATIONS  --------------------------------------------------------------------------------  Allergies    albuterol (Unknown)    Intolerances      Standing Inpatient Medications  docusate sodium 100 milliGRAM(s) Oral two times a day  fluocinonide 0.05% Solution 1 Application(s) Topical two times a day  senna 2 Tablet(s) Oral at bedtime  sodium chloride 0.9%. 1000 milliLiter(s) IV Continuous <Continuous>  sodium chloride 0.9%. 1000 milliLiter(s) IV Continuous <Continuous>  tamsulosin 0.4 milliGRAM(s) Oral at bedtime    PRN Inpatient Medications      REVIEW OF SYSTEMS  All other systems were reviewed and are negative, except as noted.    VITALS/PHYSICAL EXAM  --------------------------------------------------------------------------------  T(C): 36.7 (07-10-19 @ 14:12), Max: 36.8 (07-09-19 @ 18:25)  HR: 98 (07-10-19 @ 14:12) (76 - 98)  BP: 106/68 (07-10-19 @ 14:12) (106/68 - 152/87)  RR: 18 (07-10-19 @ 14:12) (16 - 18)  SpO2: 93% (07-10-19 @ 14:12) (93% - 96%)  Wt(kg): --        07-09-19 @ 07:01  -  07-10-19 @ 07:00  --------------------------------------------------------  IN: 480 mL / OUT: 2300 mL / NET: -1820 mL    07-10-19 @ 07:01  -  07-10-19 @ 15:58  --------------------------------------------------------  IN: 540 mL / OUT: 450 mL / NET: 90 mL      Physical Exam:  	Gen: NAD, well-appearing  	Pulm: CTA B/L  	CV: RRR, S1S2; no rub  	Abd: +BS, soft, nontender/nondistended  	Ext: no edema    LABS/STUDIES  --------------------------------------------------------------------------------              12.8   9.9   >-----------<  330      [07-09-19 @ 07:15]              39.1     140  |  100  |  20  ----------------------------<  130      [07-10-19 @ 07:18]  4.2   |  28  |  1.62        Ca     9.0     [07-10-19 @ 07:18]            Creatinine Trend:  SCr 1.62 [07-10 @ 07:18]  SCr 1.40 [07-09 @ 07:15]  SCr 1.39 [07-08 @ 07:12]  SCr 1.44 [07-07 @ 07:11]  SCr 1.48 [07-06 @ 12:58]    Urinalysis - [07-02-19 @ 00:25]      Color Light Yellow / Appearance Clear / SG 1.015 / pH 6.0      Gluc Negative / Ketone Negative  / Bili Negative / Urobili Negative       Blood Trace / Protein 30 mg/dL / Leuk Est Negative / Nitrite Negative      RBC 2 / WBC 2 / Hyaline 0 / Gran  / Sq Epi  / Non Sq Epi 0 / Bacteria Negative      HbA1c 6.6      [04-22-19 @ 14:50]
Subjective:  Patient doing well this AM. Creatinine continues to decrease, chang light pink on minimal CBI.     Objectives:  T(C): 36.2 (07-04-19 @ 13:42), Max: 36.6 (07-04-19 @ 05:18)  HR: 91 (07-04-19 @ 13:42) (80 - 91)  BP: 139/88 (07-04-19 @ 13:42) (136/82 - 139/88)  RR: 18 (07-04-19 @ 13:42) (18 - 18)  SpO2: 94% (07-04-19 @ 13:42) (94% - 94%)  Wt(kg): --    07-03 @ 07:01  -  07-04 @ 07:00  --------------------------------------------------------  IN:    Oral Fluid: 1460 mL  Total IN: 1460 mL    OUT:  Total OUT: 0 mL    Total NET: 1460 mL      07-04 @ 07:01  -  07-04 @ 15:10  --------------------------------------------------------  IN:    Oral Fluid: 480 mL  Total IN: 480 mL    OUT:  Total OUT: 0 mL    Total NET: 480 mL          Physcial Exam  GENERAL: NAD, well-developed  ABDOMEN: Soft, Nontender, Nondistended;  GENITOURINARY: Chang draining clear pink urine on CBI.  PSYCH: AAOx3    LABS:                        13.2   10.0  )-----------( 343      ( 04 Jul 2019 06:59 )             40.4     07-04    141  |  103  |  28<H>  ----------------------------<  118<H>  4.4   |  26  |  2.08<H>    Ca    9.1      04 Jul 2019 06:59  Phos  3.0     07-04  Mg     2.1     07-04      CAPILLARY BLOOD GLUCOSE          CAPILLARY BLOOD GLUCOSE
UROLOGY DAILY PROGRESS NOTE:     Subjective:    Patient feels well. No events overnight. Tolerating diet. No complaints of back or SP pain. No sensation of SP fullness.    Objective:    NAD, awake and alert  Respirations nonlabored  Abdomen soft, nontender, nondistended  No SP pain or fullness  Urine clear in chang draining well. Very slow drip CBI    MEDICATIONS  (STANDING):  fluocinonide 0.05% Solution 1 Application(s) Topical two times a day  gabapentin 400 milliGRAM(s) Oral three times a day  tamsulosin 0.4 milliGRAM(s) Oral at bedtime    MEDICATIONS  (PRN):      Vital Signs Last 24 Hrs  T(C): 36.7 (07 Jul 2019 06:06), Max: 36.9 (06 Jul 2019 20:46)  T(F): 98 (07 Jul 2019 06:06), Max: 98.4 (06 Jul 2019 20:46)  HR: 77 (07 Jul 2019 06:06) (77 - 108)  BP: 121/78 (07 Jul 2019 06:06) (115/74 - 138/84)  BP(mean): --  RR: 16 (07 Jul 2019 06:06) (16 - 18)  SpO2: 93% (07 Jul 2019 06:06) (93% - 100%)    I&O's Detail    06 Jul 2019 07:01  -  07 Jul 2019 07:00  --------------------------------------------------------  IN:    Oral Fluid: 1440 mL  Total IN: 1440 mL    OUT:  Total OUT: 0 mL    Total NET: 1440 mL          Daily     Daily     LABS:    07-07    139  |  100  |  18  ----------------------------<  138<H>  3.9   |  28  |  1.44<H>    Ca    8.7      07 Jul 2019 07:11
Urology Daily Progress Note    SUBJECTIVE:  Pt seen and examined at bedside.  He is doing well, CBI clamped -urine remains clear.  Denies any dysuria.      OBJECTIVE:  Vital Signs Last 24 Hrs  T(C): 36.7 (06 Jul 2019 09:36), Max: 37.3 (05 Jul 2019 17:45)  T(F): 98.1 (06 Jul 2019 09:36), Max: 99.1 (05 Jul 2019 17:45)  HR: 89 (06 Jul 2019 09:36) (85 - 94)  BP: 130/85 (06 Jul 2019 09:36) (120/80 - 142/90)  BP(mean): --  RR: 18 (06 Jul 2019 09:36) (16 - 18)  SpO2: 97% (06 Jul 2019 09:36) (94% - 97%)    07-05-19 @ 07:01  -  07-06-19 @ 07:00  --------------------------------------------------------  IN: 1560 mL / OUT: 1200 mL / NET: 360 mL      Exam:  GEN: A&Ox3, NAD  HEENT: atraumatic, normocephalic  RESP: no increased work of breathing, no use of accessory muscles  GI/ABD: soft, NT, ND  : urine clear, CBI clamped  EXTREMITIES: warm, pink, well-perfused             Labs:  CBC (07-05 @ 06:19)                              13.5                           10.1    )----------------(  330        64.5  % Neutrophils, 17.3  % Lymphocytes, ANC: 6.5                                 39.7                  BMP (07-05 @ 06:12)             140     |  103     |  24<H> 		Ca++ --      Ca 8.4                ---------------------------------( 129<H>		Mg --                 4.3     |  27      |  1.61<H>			Ph --                -> .Blood Culture (07-02 @ 02:20)     NG    NG    No growth to date.    -> .Urine Culture (07-02 @ 02:17)     NG    NG    <10,000 CFU/mL Normal Urogenital Leesa
Urology Daily Progress Note    SUBJECTIVE:  Pt seen and examined, and is resting comfortably in bed. No acute events overnight. Pain is adequately controlled on current regimen. Pt has no complaints at this time.     OBJECTIVE:  Vital Signs Last 24 Hrs  T(C): 36.5 (05 Jul 2019 06:03), Max: 37 (04 Jul 2019 21:08)  T(F): 97.7 (05 Jul 2019 06:03), Max: 98.6 (04 Jul 2019 21:08)  HR: 82 (05 Jul 2019 06:03) (82 - 100)  BP: 139/85 (05 Jul 2019 06:03) (129/85 - 141/90)  BP(mean): --  RR: 18 (05 Jul 2019 06:03) (18 - 18)  SpO2: 94% (05 Jul 2019 06:03) (94% - 95%)    I&O's Detail    04 Jul 2019 07:01  -  05 Jul 2019 07:00  --------------------------------------------------------  IN:    Oral Fluid: 1040 mL  Total IN: 1040 mL    OUT:  Total OUT: 0 mL    Total NET: 1040 mL      Exam:  GEN: A&Ox3, NAD  HEENT: atraumatic, normocephalic  RESP: no increased work of breathing, no use of accessory muscles  GI/ABD: soft, NT, ND  : urine clear on very slow CBI; clamped on rounds  EXTREMITIES: warm, pink, well-perfused                        13.5   10.1  )-----------( 330      ( 05 Jul 2019 06:19 )             39.7       07-05    140  |  103  |  24<H>  ----------------------------<  129<H>  4.3   |  27  |  1.61<H>    Ca    8.4      05 Jul 2019 06:12  Phos  3.0     07-04  Mg     2.1     07-04
Urology Daily Progress Note    SUBJECTIVE:  Pt seen and examined, and is resting comfortably in bed. No acute events overnight. Pain is adequately controlled on current regimen. Pt has no complaints at this time.     OBJECTIVE:  Vital Signs Last 24 Hrs  T(C): 36.6 (03 Jul 2019 09:22), Max: 37 (03 Jul 2019 01:10)  T(F): 97.8 (03 Jul 2019 09:22), Max: 98.6 (03 Jul 2019 01:10)  HR: 93 (03 Jul 2019 09:22) (77 - 97)  BP: 138/86 (03 Jul 2019 09:22) (124/70 - 171/89)  BP(mean): --  RR: 17 (03 Jul 2019 09:22) (17 - 17)  SpO2: 95% (03 Jul 2019 09:22) (93% - 96%)    I&O's Detail    02 Jul 2019 07:01  -  03 Jul 2019 07:00  --------------------------------------------------------  IN:    Oral Fluid: 930 mL  Total IN: 930 mL    OUT:    Indwelling Catheter - Urethral: 200 mL  Total OUT: 200 mL    Total NET: 730 mL      03 Jul 2019 07:01  -  03 Jul 2019 14:01  --------------------------------------------------------  IN:    Oral Fluid: 300 mL  Total IN: 300 mL    OUT:  Total OUT: 0 mL    Total NET: 300 mL        Exam:  GEN: A&Ox3, NAD  HEENT: atraumatic, normocephalic  CV: no JVD  RESP: no increased work of breathing, no use of accessory muscles  GI/ABD: soft, NT, ND  : Urine clear light pink on CBI  EXTREMITIES: warm, pink, well-perfused                        13.2   12.5  )-----------( 321      ( 03 Jul 2019 07:21 )             40.8       07-03    140  |  101  |  42<H>  ----------------------------<  131<H>  4.8   |  24  |  5.03<H>    Ca    8.9      03 Jul 2019 07:21  Phos  5.4     07-02  Mg     2.5     07-02    TPro  7.5  /  Alb  3.6  /  TBili  0.6  /  DBili  x   /  AST  14  /  ALT  11  /  AlkPhos  54  07-02
Urology Daily Progress Note    SUBJECTIVE:  Pt seen and examined, and is resting comfortably in bed. No acute events overnight. Pain is adequately controlled on current regimen. Pt has no complaints at this time. CBI slowed this morning, noted to be tea color, clamped at 3pm.      OBJECTIVE:  Vital Signs Last 24 Hrs  T(C): 36.7 (08 Jul 2019 13:59), Max: 36.9 (07 Jul 2019 17:49)  T(F): 98 (08 Jul 2019 13:59), Max: 98.5 (07 Jul 2019 17:49)  HR: 103 (08 Jul 2019 13:59) (77 - 104)  BP: 108/72 (08 Jul 2019 13:59) (108/72 - 134/81)  BP(mean): --  RR: 18 (08 Jul 2019 13:59) (18 - 18)  SpO2: 95% (08 Jul 2019 13:59) (94% - 95%)    I&O's Detail    07 Jul 2019 07:01  -  08 Jul 2019 07:00  --------------------------------------------------------  IN:    Oral Fluid: 1440 mL  Total IN: 1440 mL    OUT:  Total OUT: 0 mL    Total NET: 1440 mL      08 Jul 2019 07:01  -  08 Jul 2019 15:13  --------------------------------------------------------  IN:    Oral Fluid: 340 mL  Total IN: 340 mL    OUT:  Total OUT: 0 mL    Total NET: 340 mL        Exam:  GEN: A&Ox3, NAD  HEENT: atraumatic, normocephalic  RESP: no increased work of breathing, no use of accessory muscles  GI/ABD: soft, NT, ND  : chang w/ tea-colored urine CBI very slow drip  EXTREMITIES: warm, pink, well-perfused                        12.5   9.2   )-----------( 314      ( 08 Jul 2019 07:12 )             37.5       07-08    141  |  100  |  19  ----------------------------<  135<H>  4.1   |  28  |  1.39<H>    Ca    8.7      08 Jul 2019 07:12
Urology Daily Progress Note    SUBJECTIVE:  Pt seen and examined, and is resting comfortably in bed. No acute events overnight. Pain is adequately controlled on current regimen. Pt has no complaints at this time. Urine remained clear off CBI since yesterday at 3pm.     OBJECTIVE:  Vital Signs Last 24 Hrs  T(C): 36.7 (09 Jul 2019 04:58), Max: 37.1 (08 Jul 2019 17:05)  T(F): 98.1 (09 Jul 2019 04:58), Max: 98.8 (08 Jul 2019 17:05)  HR: 78 (09 Jul 2019 04:58) (77 - 103)  BP: 116/77 (09 Jul 2019 04:58) (100/66 - 129/79)  BP(mean): --  RR: 17 (09 Jul 2019 04:58) (17 - 18)  SpO2: 93% (09 Jul 2019 04:58) (93% - 96%)    I&O's Detail    08 Jul 2019 07:01  -  09 Jul 2019 07:00  --------------------------------------------------------  IN:    Oral Fluid: 840 mL  Total IN: 840 mL    OUT:    Indwelling Catheter - Urethral: 750 mL  Total OUT: 750 mL    Total NET: 90 mL        Exam:  GEN: A&Ox3, NAD  HEENT: atraumatic, normocephalic  CV: no JVD  RESP: no increased work of breathing, no use of accessory muscles  GI/ABD: soft, NT, ND  : Lopez removed on at 0630  EXTREMITIES: warm, pink, well-perfused                        12.5   9.2   )-----------( 314      ( 08 Jul 2019 07:12 )             37.5       07-09    140  |  98  |  20  ----------------------------<  173<H>  4.3   |  26  |  1.40<H>    Ca    9.0      09 Jul 2019 07:15
Patient is a 78y old  Male who presents with a chief complaint of ARF, obstructive uropathy, hematuria (07 Jul 2019 09:29)      SUBJECTIVE / OVERNIGHT EVENTS:  urine became bloody after being off CBI  so restarted per urology  pt feels well otherwise.  no cp, no sob, no n/v/d. no abdominal pain.  no headache, no dizziness.   Cr continues to trend down.         Vital Signs Last 24 Hrs  T(C): 36.9 (07 Jul 2019 17:49), Max: 37.2 (07 Jul 2019 13:30)  T(F): 98.5 (07 Jul 2019 17:49), Max: 98.9 (07 Jul 2019 13:30)  HR: 104 (07 Jul 2019 17:49) (77 - 108)  BP: 121/84 (07 Jul 2019 17:49) (115/68 - 121/84)  BP(mean): --  RR: 18 (07 Jul 2019 17:49) (16 - 18)  SpO2: 95% (07 Jul 2019 17:49) (93% - 95%)  I&O's Summary    06 Jul 2019 07:01  -  07 Jul 2019 07:00  --------------------------------------------------------  IN: 1440 mL / OUT: 0 mL / NET: 1440 mL    07 Jul 2019 07:01  -  07 Jul 2019 18:43  --------------------------------------------------------  IN: 720 mL / OUT: 0 mL / NET: 720 mL        PHYSICAL EXAM:  GENERAL: NAD, Comfortable  HEAD:  Atraumatic, Normocephalic  EYES: EOMI, PERRLA, conjunctiva and sclera clear  NECK: Supple, No JVD  CHEST/LUNG: Clear to auscultation bilaterally; No wheeze  HEART: Regular rate and rhythm; No murmurs, rubs, or gallops  ABDOMEN: Soft, Nontender, Nondistended; Bowel sounds present  Neuro: AAO x 3, no focal deficit, 5/5 b/l extremities  : CBI in place, pinkish color urine, neg CVAT   EXTREMITIES:  2+ Peripheral Pulses, No clubbing, cyanosis, or edema  SKIN: No rashes or lesions      LABS:    07-07    139  |  100  |  18  ----------------------------<  138<H>  3.9   |  28  |  1.44<H>    Ca    8.7      07 Jul 2019 07:11        CAPILLARY BLOOD GLUCOSE                RADIOLOGY & ADDITIONAL TESTS:    Imaging Personally Reviewed:  [x] YES  [ ] NO    Consultant(s) Notes Reviewed:  [x] YES  [ ] NO      MEDICATIONS  (STANDING):  fluocinonide 0.05% Solution 1 Application(s) Topical two times a day  tamsulosin 0.4 milliGRAM(s) Oral at bedtime    MEDICATIONS  (PRN):      Care Discussed with Consultants/Other Providers [x] YES  [ ] NO    HEALTH ISSUES - PROBLEM Dx:  Hyperphosphatemia: Hyperphosphatemia  Urinary obstruction: Urinary obstruction  Zenker diverticulum: Zenker diverticulum  Prophylactic measure: Prophylactic measure  Herpes zoster without complication: Herpes zoster without complication  Eczema, unspecified type: Eczema, unspecified type  Prostate cancer: Prostate cancer  Hydronephrosis, unspecified hydronephrosis type: Hydronephrosis, unspecified hydronephrosis type  Obstructive uropathy: Obstructive uropathy  VARUN (acute kidney injury): VARUN (acute kidney injury)

## 2019-07-10 NOTE — PROGRESS NOTE ADULT - PROBLEM SELECTOR PROBLEM 7
Zenker diverticulum

## 2019-07-10 NOTE — PROGRESS NOTE ADULT - ASSESSMENT
79Y/O M hx of Prostate ca (dx 20 years ago) pt declined chemo/RT presenting with urinary retention, hematuria. CT scan shows bilateral mild hydronephrosis. VARUN improving.  Ucx negative.  Pt with failed TOV, Lopez replaced.     Plan  - Continue Lopez will need to be discharged with Lopez  - Pt will need an outlet procedure prior to being given future TOV  - Cr downtrending, almost normalized, continue to monitor daily BMP  - If no significant abnormalities noted on labwork this AM, patient cleared for discharge from a urologic standpoint  - Pt will need outpatient follow up with Dr. Dhillon regarding urinary retention and prostate cancer treatment    [facility]  [address] [place] [address], [place] [address]  [phone]

## 2019-07-10 NOTE — PROGRESS NOTE ADULT - PROBLEM SELECTOR PLAN 6
-s/p acute event, lesions crusted/healed  -on gabapentin 400 tid, Cr now almost normal.
-s/p acute event, lesions crusted/healed  -d/c gabapentin 400 tid, pt no longer takes this at home.  - his zoster pain has been resolved past few months.
-s/p acute event, lesions crusted/healed  -d/c gabapentin 400 tid, pt no longer takes this at home.  - his zoster pain has been resolved past few months.
-s/p acute event, lesions crusted/healed  -on gabapentin 400 tid, Cr now almost normal.
-s/p acute event, lesions crusted/healed  -on gabapentin 400 tid, Cr now almost normal.
-s/p acute event, lesions crusted/healed  -d/c gabapentin 400 tid, pt no longer takes this at home.  - his zoster pain has been resolved past few months.
-s/p acute event, lesions crusted/healed  -d/c gabapentin 400 tid, pt no longer takes this at home.  - his zoster pain has been resolved past few months.
-s/p acute event, lesions crusted/healed  -pt has not yet started gabapentin 400 tid, will have to hold given elevated Cr

## 2019-07-10 NOTE — DISCHARGE NOTE PROVIDER - NSDCCPCAREPLAN_GEN_ALL_CORE_FT
PRINCIPAL DISCHARGE DIAGNOSIS  Diagnosis: Urinary obstruction  Assessment and Plan of Treatment: Discharge home with chang   outpatient follow up with Dr. Dhillon regarding urinary retention and prostate cancer treatment  [facility]  [address] [place] [address]  [phone]         SECONDARY DISCHARGE DIAGNOSES  Diagnosis: VARUN (acute kidney injury)  Assessment and Plan of Treatment: improving PRINCIPAL DISCHARGE DIAGNOSIS  Diagnosis: Urinary obstruction  Assessment and Plan of Treatment: Stable, Creatinine is improved. Discharge home with chang   outpatient follow up with Dr. Dhillon regarding urinary retention and prostate cancer treatment  [facility]  [address] [place], [place] [id number] [phone]         SECONDARY DISCHARGE DIAGNOSES  Diagnosis: Zenker diverticulum  Assessment and Plan of Treatment: stable    Diagnosis: Prostate cancer  Assessment and Plan of Treatment: stable, cont flomax, follow up with urologist    Diagnosis: Hydronephrosis, unspecified hydronephrosis type  Assessment and Plan of Treatment: Chang cath is in place, follow up with Urologist in 1-2 weeks    Diagnosis: VARUN (acute kidney injury)  Assessment and Plan of Treatment: improving

## 2019-07-10 NOTE — PROGRESS NOTE ADULT - PROBLEM SELECTOR PLAN 1
due to post renal obstruction in settin prostate cancer   - Baseline Cr 1.0 (04/2019)  - On admission Cr 10..7-->6.82 (after chang) --> 5.0-->2.0-->1.61 (7/5)-->1.62    PLAN:  - currently TOV  - monitor BMP and UOP  - avoid nephrotoxic agents, renally dose all medications  - maintain adequate perfusion pressures  - No indication for urgent dialysis    Nephrology signing off, please re-consult if needed. Thank you

## 2019-07-10 NOTE — PROGRESS NOTE ADULT - PROBLEM SELECTOR PROBLEM 1
VARUN (acute kidney injury)
VRAUN (acute kidney injury)
VARUN (acute kidney injury)

## 2019-07-10 NOTE — PROGRESS NOTE ADULT - PROBLEM SELECTOR PLAN 2
-as noted, labs, hx and imaging consistent with obstruction likely progressive neglected prostate cancer vs. passed stone on ddx.   -urology eval for b/l hydro, prostate cancer, hematuria, as well as CT abd/pelvis reading R perinephric stranding/perinephric fluid with calcyeal rupture.  Will need to f/u recs regarding ?CT urogram urgently.   urology f/u  -maintain chang/CBI per urology
likely 2/2 prostate cancer  - CT A/P showed obstructive uropathy as demonstrated   - Ultrasound which shows hydronephrosis  - With hematuria for which urology placed 3-way Chang catheter    PLAN:   - c/w chang catheter   - urology following, currently CBI
-as noted, labs, hx and imaging consistent with obstruction likely progressive neglected prostate cancer vs. passed stone on ddx.   -urology eval for b/l hydro, prostate cancer, hematuria, as well as CT abd/pelvis reading R perinephric stranding/perinephric fluid with calcyeal rupture.  Will need to f/u recs regarding ?CT urogram urgently.   urology f/u  -maintain chang/CBI per urology
-as noted, labs, hx and imaging consistent with obstruction likely progressive neglected prostate cancer vs. passed stone on ddx.   -urology eval for b/l hydro, prostate cancer, hematuria, as well as CT abd/pelvis reading R perinephric stranding/perinephric fluid with calcyeal rupture.  Will need to f/u recs regarding ?CT urogram urgently.   urology f/u  -s/p CBI  -pt will go home with chang and leg drainage bag
likely 2/2 prostate cancer  - CT A/P showed obstructive uropathy as demonstrated   - Ultrasound which shows hydronephrosis  - With hematuria for which urology placed 3-way Chang catheter    PLAN:   - c/w chang catheter   - urology following, currently CBI
likely 2/2 prostate cancer  - CT A/P showed obstructive uropathy as demonstrated   - Ultrasound which shows hydronephrosis  - With hematuria for which urology placed 3-way Lopez catheter    PLAN:   - urology following, off CBI, currently on TOV
-as noted, labs, hx and imaging consistent with obstruction likely progressive neglected prostate cancer vs. passed stone on ddx.   -urology eval for b/l hydro, prostate cancer, hematuria, as well as CT abd/pelvis reading R perinephric stranding/perinephric fluid with calcyeal rupture.  Will need to f/u recs regarding ?CT urogram urgently.   urology f/u  -maintain chang/CBI per urology
-as noted, labs, hx and imaging consistent with obstruction likely progressive neglected prostate cancer vs. passed stone on ddx.   -urology eval for b/l hydro, prostate cancer, hematuria, as well as CT abd/pelvis reading R perinephric stranding/perinephric fluid with calcyeal rupture.  Will need to f/u recs regarding ?CT urogram urgently.   urology f/u  -maintain chang/CBI per urology
-as noted, labs, hx and imaging consistent with obstruction likely progressive neglected prostate cancer vs. passed stone on ddx.   -urology eval for b/l hydro, prostate cancer, hematuria, as well as CT abd/pelvis reading R perinephric stranding/perinephric fluid with calcyeal rupture.  Will need to f/u recs regarding ?CT urogram urgently.   urology f/u  -s/p chang/CBI per urology, TOV in progress

## 2019-07-10 NOTE — PROGRESS NOTE ADULT - ASSESSMENT
78 M PMH Prostate ca (dx 20 years ago) no chemo/RT in past but with recently noted rising PSA, eczema poorly responsive to topical emollients s/p oral steroid taper 5/2019, Zenker's diverticulum c/b dysphagia and intermittent aspiration, recent admission to [facility] for facial cellulitis in setting eczema s/p IV abx, upon d/c dx with probable facial zoster, now presents with 2 weeks of progressive lethargy and flank pain, f/w b/l hydro/bladder thickening/R perinephric stranding and hematuria in setting of chang placement for acute urinary retention and labwork showing acute kidney injury 2/2 obstructive uropathy, with concern for metastatic prostate cancer underlying his presentation.

## 2019-07-10 NOTE — PROGRESS NOTE ADULT - ATTENDING COMMENTS
- Dr. BESSY Caruso ([facility])  - [phone]
Pt seen and examined. Agree with note as written above.    Continue care as stated above
- Dr. BESSY Caruso ([facility])  - [phone]
Santana De Jesus will be covering for me starting 7/10/19. He can be reached at  if needed.     - Dr. BESSY Caruso ([facility])  - [phone]
Feeling better  1.   ARF--improved.  NO HD requirement at present  2.  Obstructive uropathy--chang decompression.  Apprciate urology input
Feeling improved  1.  Obstructive uropathy--dramatic improvement.  Appreciate urology input.  Non oliguric, no HD.  Will likely need chronic renal f/u [phone]
Severe acute obstructive uropathy largely resolved with catheter drainage  1.  ARF--non oliguric, no HD  2.  Obstructive uropathy--rx as indicated by urology.  Needs chronic urinary decompression, prostate etc
- Dr. BESSY Caruso ([facility])  - [phone]
- Dr. BESSY Caruso ([facility])  - [phone]
- Dr. BESSY Caruso ([facility])  - [phone]
- Dr. BESSY Caruso ([facility])  - [phone]

## 2019-07-10 NOTE — PROGRESS NOTE ADULT - PROBLEM SELECTOR PROBLEM 2
Obstructive uropathy
Urinary obstruction
Obstructive uropathy
Urinary obstruction
Urinary obstruction
Obstructive uropathy

## 2019-07-10 NOTE — PROGRESS NOTE ADULT - PROBLEM SELECTOR PROBLEM 6
Herpes zoster without complication

## 2019-07-10 NOTE — PROGRESS NOTE ADULT - PROBLEM SELECTOR PLAN 8
given ?calcyeal rupture and hematuria, holding pharm vte ppx  -start scd.
given ?calcyeal rupture and hematuria, holding pharm vte ppx  -start scd.  -encouraged early ambulation
given ?calcyeal rupture and hematuria, holding pharm vte ppx  -start scd.
given ?calcyeal rupture and hematuria, holding pharm vte ppx  -start scd.  -encouraged early ambulation
given ?calcyeal rupture and hematuria, holding pharm vte ppx  -start scd.

## 2019-07-10 NOTE — PROGRESS NOTE ADULT - PROVIDER SPECIALTY LIST ADULT
Internal Medicine
Nephrology
Nephrology
Urology
Internal Medicine
Nephrology
Internal Medicine

## 2019-07-10 NOTE — DISCHARGE NOTE PROVIDER - NSDCCAREPROVSEEN_GEN_ALL_CORE_FT
Kirill Caruso Kirill Caruso  [facility], Team  [facility] Medicine, Advance PracticeTeam

## 2019-07-10 NOTE — PROGRESS NOTE ADULT - PROBLEM SELECTOR PLAN 3
RESOLVED  likely 2/2 VARUN obstructive  - on admission Phos 6.6    PLAN:  - tx underlying etiology (VARUN)  - monitor Phos level
as above
RESOLVED  likely 2/2 VARUN obstructive  - on admission Phos 6.6    PLAN:  - tx underlying etiology (VARUN)  - monitor Phos level
as above
likely 2/2 VARUN obstructive  - on admission Phos 6.6, improved to 5.4    PLAN:  - tx underlying etiology (VARUN)  - monitor Phos level
as above
-urology eval as noted, mgt of juancarlos as noted
as above

## 2019-07-10 NOTE — PROGRESS NOTE ADULT - PROBLEM SELECTOR PLAN 1
-SCR >10, baseline ~ 1.0. evidence of obstruction on exam (distended abd, 1L urine retained and relief of sx post chang). FeNA 4.9% suggestive of post obstructive uropathy.  -CT reviewed, b/l hydro + bladder thickening ?stone vs. obstructive uropathy from progressive prostate ca. UA not c/w uti, no dysuria, afebrile no leukocytosis, observe off abx.   -renal consulted by ER.   - Cr mildly worsened today, repeat in AM  - urology consult appreciated. s/p CBI which was discontinued 7/9 morning.  - pt will go home with chang and leg drainage bag

## 2019-07-10 NOTE — PROGRESS NOTE ADULT - PROBLEM SELECTOR PROBLEM 5
Eczema, unspecified type

## 2019-07-10 NOTE — DISCHARGE NOTE PROVIDER - HOSPITAL COURSE
77Y/O M hx of Prostate ca (dx 20 years ago) pt declined chemo/RT presenting with urinary retention, hematuria. CT scan shows bilateral mild hydronephrosis. VARUN improving.  Ucx negative.  Pt with failed TOV, Lopez replaced.     - Continue Lopez will need to be discharged with Lopez    - Pt will need an outlet procedure prior to being given future TOV    - Cr downtrending, almost normalized, continue to monitor daily BMP        - Pt will need outpatient follow up with Dr. Dhillon regarding urinary retention and prostate cancer treatment        [facility]    [address] [place] [address], [address] [address]    [phone] 77Y/O M hx of Prostate ca (dx 20 years ago) pt declined chemo/RT presenting with urinary retention, hematuria. CT scan shows bilateral mild hydronephrosis. VARUN improving.  Ucx negative.  Pt with failed TOV, Lopez replaced.     - Continue Lopez will need to be discharged with Lopez    - Pt will need an outlet procedure prior to being given future TOV    - Cr downtrending, almost normalized, continue to monitor daily BMP        - Pt will need outpatient follow up with Dr. Dhillon regarding urinary retention and prostate cancer treatment        [facility]    [address] [place] [address], [address]    [place] [address]    [phone]     He was seen by Urology, Renal and his creatinine is much improved. He can go home today with Lopez cath, spoke to Attending.

## 2019-07-10 NOTE — PROGRESS NOTE ADULT - PROBLEM SELECTOR PROBLEM 3
Hydronephrosis, unspecified hydronephrosis type
Hyperphosphatemia
Hydronephrosis, unspecified hydronephrosis type
Hyperphosphatemia
Hyperphosphatemia
Hydronephrosis, unspecified hydronephrosis type

## 2019-07-10 NOTE — PROGRESS NOTE ADULT - PROBLEM SELECTOR PLAN 7
-known hx, being followed by house ENT Dr. Moay, has already received cardiac optimization/clearance for procedure in mid July, but pt is still actively considering procedure.  -aspiration precautions. dysphagia diet baseline
-known hx, being followed by house ENT Dr. Moya, has already received cardiac optimization/clearance for procedure in mid July  - pt has canceled the procedure for now.   -aspiration precautions. dysphagia diet baseline
-known hx, being followed by house ENT Dr. Moya, has already received cardiac optimization/clearance for procedure in mid July, but pt is still actively considering procedure.  -aspiration precautions. dysphagia diet baseline
-known hx, being followed by house ENT Dr. Moya, has already received cardiac optimization/clearance for procedure in mid July  - pt has canceled the procedure for now.   -aspiration precautions. dysphagia diet baseline
-known hx, being followed by house ENT Dr. Moya, has already received cardiac optimization/clearance for procedure in mid July, but pt is still actively considering procedure.  -aspiration precautions. dysphagia diet baseline
verbal cues/1 person assist

## 2019-07-10 NOTE — PROGRESS NOTE ADULT - PROBLEM SELECTOR PROBLEM 4
Prostate cancer

## 2019-07-10 NOTE — PROGRESS NOTE ADULT - PROBLEM SELECTOR PLAN 5
-can resume lidex cream  -o/p derm f/u

## 2019-07-10 NOTE — PROGRESS NOTE ADULT - NSHPATTENDINGPLANDISCUSS_GEN_ALL_CORE
pt
pt and NP Annamarie
pt and RN
med, urology teams
pt

## 2019-07-11 ENCOUNTER — APPOINTMENT (OUTPATIENT)
Dept: OTOLARYNGOLOGY | Facility: HOSPITAL | Age: 79
End: 2019-07-11

## 2019-07-11 ENCOUNTER — TRANSCRIPTION ENCOUNTER (OUTPATIENT)
Age: 79
End: 2019-07-11

## 2019-07-11 VITALS
SYSTOLIC BLOOD PRESSURE: 112 MMHG | HEART RATE: 85 BPM | OXYGEN SATURATION: 96 % | RESPIRATION RATE: 17 BRPM | DIASTOLIC BLOOD PRESSURE: 70 MMHG | TEMPERATURE: 98 F

## 2019-07-11 LAB
ANION GAP SERPL CALC-SCNC: 13 MMOL/L — SIGNIFICANT CHANGE UP (ref 5–17)
BUN SERPL-MCNC: 23 MG/DL — SIGNIFICANT CHANGE UP (ref 7–23)
CALCIUM SERPL-MCNC: 8.3 MG/DL — LOW (ref 8.4–10.5)
CHLORIDE SERPL-SCNC: 98 MMOL/L — SIGNIFICANT CHANGE UP (ref 96–108)
CO2 SERPL-SCNC: 27 MMOL/L — SIGNIFICANT CHANGE UP (ref 22–31)
CREAT SERPL-MCNC: 1.48 MG/DL — HIGH (ref 0.5–1.3)
GLUCOSE SERPL-MCNC: 127 MG/DL — HIGH (ref 70–99)
POTASSIUM SERPL-MCNC: 4 MMOL/L — SIGNIFICANT CHANGE UP (ref 3.5–5.3)
POTASSIUM SERPL-SCNC: 4 MMOL/L — SIGNIFICANT CHANGE UP (ref 3.5–5.3)
SODIUM SERPL-SCNC: 138 MMOL/L — SIGNIFICANT CHANGE UP (ref 135–145)

## 2019-07-11 PROCEDURE — 97161 PT EVAL LOW COMPLEX 20 MIN: CPT

## 2019-07-11 PROCEDURE — 93005 ELECTROCARDIOGRAM TRACING: CPT | Mod: XU

## 2019-07-11 PROCEDURE — 83735 ASSAY OF MAGNESIUM: CPT

## 2019-07-11 PROCEDURE — 84300 ASSAY OF URINE SODIUM: CPT

## 2019-07-11 PROCEDURE — 97530 THERAPEUTIC ACTIVITIES: CPT

## 2019-07-11 PROCEDURE — 99285 EMERGENCY DEPT VISIT HI MDM: CPT | Mod: 25

## 2019-07-11 PROCEDURE — 82435 ASSAY OF BLOOD CHLORIDE: CPT

## 2019-07-11 PROCEDURE — 82947 ASSAY GLUCOSE BLOOD QUANT: CPT

## 2019-07-11 PROCEDURE — 97116 GAIT TRAINING THERAPY: CPT

## 2019-07-11 PROCEDURE — 85027 COMPLETE CBC AUTOMATED: CPT

## 2019-07-11 PROCEDURE — 76770 US EXAM ABDO BACK WALL COMP: CPT

## 2019-07-11 PROCEDURE — 84295 ASSAY OF SERUM SODIUM: CPT

## 2019-07-11 PROCEDURE — 80048 BASIC METABOLIC PNL TOTAL CA: CPT

## 2019-07-11 PROCEDURE — 82570 ASSAY OF URINE CREATININE: CPT

## 2019-07-11 PROCEDURE — 83874 ASSAY OF MYOGLOBIN: CPT

## 2019-07-11 PROCEDURE — 87040 BLOOD CULTURE FOR BACTERIA: CPT

## 2019-07-11 PROCEDURE — 74176 CT ABD & PELVIS W/O CONTRAST: CPT

## 2019-07-11 PROCEDURE — 83605 ASSAY OF LACTIC ACID: CPT

## 2019-07-11 PROCEDURE — 51702 INSERT TEMP BLADDER CATH: CPT

## 2019-07-11 PROCEDURE — 81001 URINALYSIS AUTO W/SCOPE: CPT

## 2019-07-11 PROCEDURE — 87086 URINE CULTURE/COLONY COUNT: CPT

## 2019-07-11 PROCEDURE — 80053 COMPREHEN METABOLIC PANEL: CPT

## 2019-07-11 PROCEDURE — 84132 ASSAY OF SERUM POTASSIUM: CPT

## 2019-07-11 PROCEDURE — 82436 ASSAY OF URINE CHLORIDE: CPT

## 2019-07-11 PROCEDURE — 84100 ASSAY OF PHOSPHORUS: CPT

## 2019-07-11 PROCEDURE — 84156 ASSAY OF PROTEIN URINE: CPT

## 2019-07-11 PROCEDURE — 85014 HEMATOCRIT: CPT

## 2019-07-11 PROCEDURE — 83690 ASSAY OF LIPASE: CPT

## 2019-07-11 PROCEDURE — 82803 BLOOD GASES ANY COMBINATION: CPT

## 2019-07-11 PROCEDURE — 83935 ASSAY OF URINE OSMOLALITY: CPT

## 2019-07-11 PROCEDURE — 82330 ASSAY OF CALCIUM: CPT

## 2019-07-11 RX ORDER — TAMSULOSIN HYDROCHLORIDE 0.4 MG/1
1 CAPSULE ORAL
Qty: 30 | Refills: 0
Start: 2019-07-11 | End: 2019-08-09

## 2019-07-11 RX ORDER — TAMSULOSIN HYDROCHLORIDE 0.4 MG/1
1 CAPSULE ORAL
Qty: 0 | Refills: 0 | DISCHARGE
Start: 2019-07-11

## 2019-07-11 RX ORDER — TADALAFIL 10 MG/1
1 TABLET, FILM COATED ORAL
Qty: 0 | Refills: 0 | DISCHARGE

## 2019-07-11 NOTE — DISCHARGE NOTE NURSING/CASE MANAGEMENT/SOCIAL WORK - NSDCPNINST_GEN_ALL_CORE
NOTIFY YOUR PHYSCIAN/SURGEON IF: You have any bleeding that does not stop, any pus draining from your wound(s), any fever (over 100.4 F) or chills, persistent nausea/vomiting, persistent diarrhea, or if your pain is not controlled on your discharge pain medications.

## 2019-07-11 NOTE — DISCHARGE NOTE NURSING/CASE MANAGEMENT/SOCIAL WORK - NSDCDPATPORTLINK_GEN_ALL_CORE
You can access the [facility] Patient Portal, offered by [facility], by registering with the following website: [email or web address]

## 2019-07-16 ENCOUNTER — APPOINTMENT (OUTPATIENT)
Dept: UROLOGY | Facility: CLINIC | Age: 79
End: 2019-07-16
Payer: MEDICARE

## 2019-07-16 PROCEDURE — 99214 OFFICE O/P EST MOD 30 MIN: CPT

## 2019-07-22 LAB — MYOGLOBIN UR-MCNC: SIGNIFICANT CHANGE UP

## 2019-08-02 ENCOUNTER — APPOINTMENT (OUTPATIENT)
Dept: UROLOGY | Facility: CLINIC | Age: 79
End: 2019-08-02
Payer: MEDICARE

## 2019-08-02 VITALS
HEIGHT: 69 IN | DIASTOLIC BLOOD PRESSURE: 79 MMHG | OXYGEN SATURATION: 94 % | SYSTOLIC BLOOD PRESSURE: 130 MMHG | TEMPERATURE: 98.6 F | WEIGHT: 215 LBS | HEART RATE: 93 BPM | BODY MASS INDEX: 31.84 KG/M2

## 2019-08-02 PROCEDURE — 99213 OFFICE O/P EST LOW 20 MIN: CPT

## 2019-08-02 NOTE — ASSESSMENT
[FreeTextEntry1] : Urinary retention:\par Lopez placed 3 weeks ago, offered to change Pt refused. \par Stat lock applied, spare given along with leg and drainage bag. \par \par Follow with Dr Dhillon.

## 2019-08-02 NOTE — HISTORY OF PRESENT ILLNESS
[FreeTextEntry1] : 79 yo male presents for catheter issue. \par Has history of Elevated PSA and Urinary retention with indwelling Lopez catheter. \par Follows with Dr Dhillon. \par Will like for new stat lock to be placed. \par Lopez to leg bag draining clear urine.

## 2019-08-02 NOTE — PHYSICAL EXAM
[General Appearance - In No Acute Distress] : no acute distress [FreeTextEntry1] : Lopez to leg bag draining clear urine  [] : no respiratory distress [Oriented To Time, Place, And Person] : oriented to person, place, and time

## 2019-08-06 ENCOUNTER — APPOINTMENT (OUTPATIENT)
Dept: UROLOGY | Facility: CLINIC | Age: 79
End: 2019-08-06
Payer: MEDICARE

## 2019-08-06 DIAGNOSIS — R33.9 RETENTION OF URINE, UNSPECIFIED: ICD-10-CM

## 2019-08-06 PROCEDURE — 99214 OFFICE O/P EST MOD 30 MIN: CPT

## 2019-08-06 NOTE — PHYSICAL EXAM
[General Appearance - Well Developed] : well developed [General Appearance - Well Nourished] : well nourished [Normal Appearance] : normal appearance [Well Groomed] : well groomed [General Appearance - In No Acute Distress] : no acute distress [Abdomen Soft] : soft [Abdomen Tenderness] : non-tender [Urethral Meatus] : meatus normal [Costovertebral Angle Tenderness] : no ~M costovertebral angle tenderness [Scrotum] : the scrotum was normal [Testes Mass (___cm)] : there were no testicular masses [Urinary Bladder Findings] : the bladder was normal on palpation [No Prostate Nodules] : no prostate nodules [] : no respiratory distress [Edema] : no peripheral edema [Respiration, Rhythm And Depth] : normal respiratory rhythm and effort [Exaggerated Use Of Accessory Muscles For Inspiration] : no accessory muscle use [Affect] : the affect was normal [Oriented To Time, Place, And Person] : oriented to person, place, and time [Not Anxious] : not anxious [Mood] : the mood was normal [Normal Station and Gait] : the gait and station were normal for the patient's age [No Focal Deficits] : no focal deficits [No Palpable Adenopathy] : no palpable adenopathy

## 2019-08-07 LAB
APPEARANCE: ABNORMAL
BACTERIA: NEGATIVE
BILIRUBIN URINE: NEGATIVE
BLOOD URINE: ABNORMAL
COLOR: ABNORMAL
GLUCOSE QUALITATIVE U: NEGATIVE
HYALINE CASTS: 1 /LPF
KETONES URINE: NEGATIVE
LEUKOCYTE ESTERASE URINE: ABNORMAL
MICROSCOPIC-UA: NORMAL
NITRITE URINE: POSITIVE
PH URINE: 6
PROTEIN URINE: ABNORMAL
RED BLOOD CELLS URINE: 162 /HPF
SPECIFIC GRAVITY URINE: 1.02
SQUAMOUS EPITHELIAL CELLS: 1 /HPF
UROBILINOGEN URINE: NORMAL
WHITE BLOOD CELLS URINE: 431 /HPF

## 2019-08-09 LAB — BACTERIA UR CULT: ABNORMAL

## 2019-08-27 ENCOUNTER — OUTPATIENT (OUTPATIENT)
Dept: OUTPATIENT SERVICES | Facility: HOSPITAL | Age: 79
LOS: 1 days | End: 2019-08-27

## 2019-08-27 VITALS
RESPIRATION RATE: 16 BRPM | DIASTOLIC BLOOD PRESSURE: 80 MMHG | HEIGHT: 69 IN | OXYGEN SATURATION: 99 % | WEIGHT: 216.05 LBS | HEART RATE: 77 BPM | TEMPERATURE: 97 F | SYSTOLIC BLOOD PRESSURE: 122 MMHG

## 2019-08-27 DIAGNOSIS — N40.1 BENIGN PROSTATIC HYPERPLASIA WITH LOWER URINARY TRACT SYMPTOMS: ICD-10-CM

## 2019-08-27 DIAGNOSIS — Z01.818 ENCOUNTER FOR OTHER PREPROCEDURAL EXAMINATION: ICD-10-CM

## 2019-08-27 DIAGNOSIS — R06.83 SNORING: ICD-10-CM

## 2019-08-27 DIAGNOSIS — J45.909 UNSPECIFIED ASTHMA, UNCOMPLICATED: ICD-10-CM

## 2019-08-27 LAB
ANION GAP SERPL CALC-SCNC: 13 MMO/L — SIGNIFICANT CHANGE UP (ref 7–14)
APPEARANCE UR: SIGNIFICANT CHANGE UP
BACTERIA # UR AUTO: HIGH
BILIRUB UR-MCNC: NEGATIVE — SIGNIFICANT CHANGE UP
BLD GP AB SCN SERPL QL: NEGATIVE — SIGNIFICANT CHANGE UP
BLOOD UR QL VISUAL: SIGNIFICANT CHANGE UP
BUN SERPL-MCNC: 16 MG/DL — SIGNIFICANT CHANGE UP (ref 7–23)
CALCIUM SERPL-MCNC: 9.5 MG/DL — SIGNIFICANT CHANGE UP (ref 8.4–10.5)
CHLORIDE SERPL-SCNC: 101 MMOL/L — SIGNIFICANT CHANGE UP (ref 98–107)
CO2 SERPL-SCNC: 27 MMOL/L — SIGNIFICANT CHANGE UP (ref 22–31)
COLOR SPEC: YELLOW — SIGNIFICANT CHANGE UP
CREAT SERPL-MCNC: 1.06 MG/DL — SIGNIFICANT CHANGE UP (ref 0.5–1.3)
GLUCOSE SERPL-MCNC: 101 MG/DL — HIGH (ref 70–99)
GLUCOSE UR-MCNC: NEGATIVE — SIGNIFICANT CHANGE UP
HCT VFR BLD CALC: 44.6 % — SIGNIFICANT CHANGE UP (ref 39–50)
HGB BLD-MCNC: 13.6 G/DL — SIGNIFICANT CHANGE UP (ref 13–17)
KETONES UR-MCNC: NEGATIVE — SIGNIFICANT CHANGE UP
LEUKOCYTE ESTERASE UR-ACNC: HIGH
MCHC RBC-ENTMCNC: 29.6 PG — SIGNIFICANT CHANGE UP (ref 27–34)
MCHC RBC-ENTMCNC: 30.5 % — LOW (ref 32–36)
MCV RBC AUTO: 97 FL — SIGNIFICANT CHANGE UP (ref 80–100)
NITRITE UR-MCNC: POSITIVE — SIGNIFICANT CHANGE UP
NRBC # FLD: 0 K/UL — SIGNIFICANT CHANGE UP (ref 0–0)
PH UR: 5.5 — SIGNIFICANT CHANGE UP (ref 5–8)
PLATELET # BLD AUTO: 245 K/UL — SIGNIFICANT CHANGE UP (ref 150–400)
PMV BLD: 11 FL — SIGNIFICANT CHANGE UP (ref 7–13)
POTASSIUM SERPL-MCNC: 4.4 MMOL/L — SIGNIFICANT CHANGE UP (ref 3.5–5.3)
POTASSIUM SERPL-SCNC: 4.4 MMOL/L — SIGNIFICANT CHANGE UP (ref 3.5–5.3)
PROT UR-MCNC: 200 — HIGH
RBC # BLD: 4.6 M/UL — SIGNIFICANT CHANGE UP (ref 4.2–5.8)
RBC # FLD: 14.2 % — SIGNIFICANT CHANGE UP (ref 10.3–14.5)
RBC CASTS # UR COMP ASSIST: SIGNIFICANT CHANGE UP (ref 0–?)
RH IG SCN BLD-IMP: POSITIVE — SIGNIFICANT CHANGE UP
SODIUM SERPL-SCNC: 141 MMOL/L — SIGNIFICANT CHANGE UP (ref 135–145)
SP GR SPEC: 1.03 — SIGNIFICANT CHANGE UP (ref 1–1.04)
UROBILINOGEN FLD QL: NORMAL — SIGNIFICANT CHANGE UP
WBC # BLD: 8.84 K/UL — SIGNIFICANT CHANGE UP (ref 3.8–10.5)
WBC # FLD AUTO: 8.84 K/UL — SIGNIFICANT CHANGE UP (ref 3.8–10.5)
WBC CLUMPS #/AREA URNS HPF: PRESENT — HIGH (ref 0–?)
WBC UR QL: >50 — HIGH (ref 0–?)

## 2019-08-27 RX ORDER — SODIUM CHLORIDE 9 MG/ML
1000 INJECTION, SOLUTION INTRAVENOUS
Refills: 0 | Status: DISCONTINUED | OUTPATIENT
Start: 2019-09-06 | End: 2019-09-10

## 2019-08-27 RX ORDER — FLUOCINONIDE/EMOLLIENT BASE 0.05 %
0 CREAM (GRAM) TOPICAL
Qty: 0 | Refills: 0 | DISCHARGE

## 2019-08-27 RX ORDER — SODIUM CHLORIDE 9 MG/ML
3 INJECTION INTRAMUSCULAR; INTRAVENOUS; SUBCUTANEOUS EVERY 8 HOURS
Refills: 0 | Status: DISCONTINUED | OUTPATIENT
Start: 2019-09-06 | End: 2019-09-10

## 2019-08-27 NOTE — H&P PST ADULT - NEGATIVE GENERAL GENITOURINARY SYMPTOMS
no bladder infections/no hematuria/no renal colic/no flank pain R/no flank pain L/no urine discoloration

## 2019-08-27 NOTE — H&P PST ADULT - HISTORY OF PRESENT ILLNESS
77 y/o male with h/o Prostate cancer, Asthma and diverticulum of esophagus presents to PST for preoperative evaluation with dx of enlarge prostate with lower unitary tract symptoms. Pt reports incomplete bladder emptying and urinary frequency. Scheduled for Cystoscopy, Transurethral Resection of Prostate on 9/6/2019. Pt was hospitalized in March for shingles infection and readmitted in July for significant urinary retention. Pt was originally scheduled to have laryngoscopy and esophagoscopy in June, but case was cancelled by surgeon. 79 y/o male with h/o Prostate cancer, Asthma and diverticulum of esophagus presents to PST for preoperative evaluation with dx of enlarge prostate with lower unitary tract symptoms. Pt reports incomplete bladder emptying and urinary frequency. Scheduled for Cystoscopy, Transurethral Resection of Prostate on 9/6/2019. Pt was hospitalized in March for shingles infection and readmitted in July for significant urinary retention. He was also originally scheduled to have laryngoscopy and esophagoscopy in June, but case was cancelled by surgeon.

## 2019-08-27 NOTE — H&P PST ADULT - NSANTHOSAYNRD_GEN_A_CORE
No. LOIS screening performed.  STOP BANG Legend: 0-2 = LOW Risk; 3-4 = INTERMEDIATE Risk; 5-8 = HIGH Risk

## 2019-08-27 NOTE — H&P PST ADULT - NEGATIVE GASTROINTESTINAL SYMPTOMS
no constipation/no abdominal pain/no steatorrhea/no diarrhea/no change in bowel habits/no melena/no nausea/no jaundice/no hiccoughs/no vomiting

## 2019-08-27 NOTE — H&P PST ADULT - NEGATIVE CARDIOVASCULAR SYMPTOMS
no claudication/no chest pain/no paroxysmal nocturnal dyspnea/no palpitations/no dyspnea on exertion/no orthopnea

## 2019-08-27 NOTE — H&P PST ADULT - NEGATIVE MUSCULOSKELETAL SYMPTOMS
no arm pain L/no arm pain R/no myalgia/no muscle cramps/no muscle weakness/no back pain/no joint swelling

## 2019-08-27 NOTE — H&P PST ADULT - NSICDXPASTMEDICALHX_GEN_ALL_CORE_FT
PAST MEDICAL HISTORY:  Abnormal EKG     Chronic obstructive asthma denies recent hospitalizations or intubations    Eczema     Enlarged prostate with lower urinary tract symptoms (LUTS)     Prostate CA no intervention    Shingles 03/2019    Thyroid nodule s/p radioactive idione tx

## 2019-08-27 NOTE — H&P PST ADULT - ENDOCRINE COMMENTS
h/o thyroid noted,- s/p radioactive idodine therapy 20 years h/o thyroid noted,- s/p radioactive iodine therapy 20 years- no current medication usage

## 2019-08-27 NOTE — H&P PST ADULT - MS GEN HX ROS MEA POS PC
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

## 2019-08-27 NOTE — H&P PST ADULT - GASTROINTESTINAL DETAILS
no masses palpable/bowel sounds normal/no distention/soft/nontender no distention/nontender/soft/bowel sounds normal

## 2019-08-27 NOTE — H&P PST ADULT - NEGATIVE BREAST SYMPTOMS
no breast tenderness L/no breast tenderness R/no breast lump L no breast tenderness L/no breast tenderness R

## 2019-08-27 NOTE — H&P PST ADULT - NSICDXPROBLEM_GEN_ALL_CORE_FT
PROBLEM DIAGNOSES  Problem: Enlarged prostate with lower urinary tract symptoms (LUTS)  Assessment and Plan: Scheduled for Cystoscopy, Transurethral Resection of Prostate on 9/6/2019.  Preop instructions given, pt verbalized understanding   GI prophylaxis provided   Medical clearance requested by surgeon - copy of MC requested in PST(h/o abnormal EKG)  Pt has a cardiac clearance and ECHO on chart preop for diverticular surgery.     Problem: Asthma  Assessment and Plan: Pt will use his Flovent inhaler on day of surgery if needed     Problem: Snoring  Assessment and Plan: LOIS precautions   OR booking notified via fax

## 2019-08-27 NOTE — H&P PST ADULT - RS GEN PE MLT RESP DETAILS PC
normal/airway patent/no chest wall tenderness/respirations non-labored/no rales/no wheezes/good air movement/breath sounds equal/clear to auscultation bilaterally/no intercostal retractions

## 2019-08-28 LAB — SPECIMEN SOURCE: SIGNIFICANT CHANGE UP

## 2019-08-29 LAB
-  AMIKACIN: SIGNIFICANT CHANGE UP
-  AMPICILLIN/SULBACTAM: SIGNIFICANT CHANGE UP
-  AMPICILLIN: SIGNIFICANT CHANGE UP
-  AZTREONAM: SIGNIFICANT CHANGE UP
-  CEFAZOLIN: SIGNIFICANT CHANGE UP
-  CEFAZOLIN: SIGNIFICANT CHANGE UP
-  CEFEPIME: SIGNIFICANT CHANGE UP
-  CEFOXITIN: SIGNIFICANT CHANGE UP
-  CEFTAZIDIME: SIGNIFICANT CHANGE UP
-  CEFTRIAXONE: SIGNIFICANT CHANGE UP
-  CIPROFLOXACIN: SIGNIFICANT CHANGE UP
-  CIPROFLOXACIN: SIGNIFICANT CHANGE UP
-  ERTAPENEM: SIGNIFICANT CHANGE UP
-  GENTAMICIN: SIGNIFICANT CHANGE UP
-  GENTAMICIN: SIGNIFICANT CHANGE UP
-  IMIPENEM: SIGNIFICANT CHANGE UP
-  LEVOFLOXACIN: SIGNIFICANT CHANGE UP
-  LEVOFLOXACIN: SIGNIFICANT CHANGE UP
-  MEROPENEM: SIGNIFICANT CHANGE UP
-  NITROFURANTOIN: SIGNIFICANT CHANGE UP
-  OXACILLIN: SIGNIFICANT CHANGE UP
-  PENICILLIN: SIGNIFICANT CHANGE UP
-  PIPERACILLIN/TAZOBACTAM: SIGNIFICANT CHANGE UP
-  RIFAMPIN.: SIGNIFICANT CHANGE UP
-  TETRACYCLINE: SIGNIFICANT CHANGE UP
-  TIGECYCLINE: SIGNIFICANT CHANGE UP
-  TOBRAMYCIN: SIGNIFICANT CHANGE UP
-  TRIMETHOPRIM/SULFAMETHOXAZOLE: SIGNIFICANT CHANGE UP
-  TRIMETHOPRIM/SULFAMETHOXAZOLE: SIGNIFICANT CHANGE UP
-  VANCOMYCIN: SIGNIFICANT CHANGE UP
BACTERIA UR CULT: SIGNIFICANT CHANGE UP
METHOD TYPE: SIGNIFICANT CHANGE UP
METHOD TYPE: SIGNIFICANT CHANGE UP
ORGANISM # SPEC MICROSCOPIC CNT: SIGNIFICANT CHANGE UP

## 2019-08-29 NOTE — SWALLOW BEDSIDE ASSESSMENT ADULT - PHARYNGEAL PHASE
Swallow triggered in an acceptable time frame for age and laryngeal lift on palpation during swallowing trials was also felt to be within functional age acceptable parameters. NO behavioral aspiration signs exhibited on exam.  used

## 2019-09-05 ENCOUNTER — TRANSCRIPTION ENCOUNTER (OUTPATIENT)
Age: 79
End: 2019-09-05

## 2019-09-05 NOTE — ASU PATIENT PROFILE, ADULT - PMH
Abnormal EKG    Chronic obstructive asthma  denies recent hospitalizations or intubations  Eczema    Enlarged prostate with lower urinary tract symptoms (LUTS)    Prostate CA  no intervention  Shingles  03/2019  Thyroid nodule  s/p radioactive idione tx

## 2019-09-06 ENCOUNTER — RESULT REVIEW (OUTPATIENT)
Age: 79
End: 2019-09-06

## 2019-09-06 ENCOUNTER — INPATIENT (INPATIENT)
Facility: HOSPITAL | Age: 79
LOS: 3 days | Discharge: HOME | End: 2019-09-10
Attending: UROLOGY | Admitting: UROLOGY
Payer: MEDICARE

## 2019-09-06 ENCOUNTER — APPOINTMENT (OUTPATIENT)
Dept: UROLOGY | Facility: HOSPITAL | Age: 79
End: 2019-09-06

## 2019-09-06 VITALS
SYSTOLIC BLOOD PRESSURE: 124 MMHG | OXYGEN SATURATION: 95 % | HEART RATE: 69 BPM | HEIGHT: 69 IN | TEMPERATURE: 98 F | DIASTOLIC BLOOD PRESSURE: 72 MMHG | RESPIRATION RATE: 16 BRPM | WEIGHT: 216.05 LBS

## 2019-09-06 DIAGNOSIS — L30.9 DERMATITIS, UNSPECIFIED: ICD-10-CM

## 2019-09-06 DIAGNOSIS — D62 ACUTE POSTHEMORRHAGIC ANEMIA: ICD-10-CM

## 2019-09-06 DIAGNOSIS — K22.5 DIVERTICULUM OF ESOPHAGUS, ACQUIRED: ICD-10-CM

## 2019-09-06 DIAGNOSIS — C61 MALIGNANT NEOPLASM OF PROSTATE: ICD-10-CM

## 2019-09-06 DIAGNOSIS — R91.1 SOLITARY PULMONARY NODULE: ICD-10-CM

## 2019-09-06 DIAGNOSIS — J44.9 CHRONIC OBSTRUCTIVE PULMONARY DISEASE, UNSPECIFIED: ICD-10-CM

## 2019-09-06 DIAGNOSIS — N40.1 BENIGN PROSTATIC HYPERPLASIA WITH LOWER URINARY TRACT SYMPTOMS: ICD-10-CM

## 2019-09-06 DIAGNOSIS — N39.0 URINARY TRACT INFECTION, SITE NOT SPECIFIED: ICD-10-CM

## 2019-09-06 DIAGNOSIS — Z29.9 ENCOUNTER FOR PROPHYLACTIC MEASURES, UNSPECIFIED: ICD-10-CM

## 2019-09-06 LAB
ANION GAP SERPL CALC-SCNC: 11 MMO/L — SIGNIFICANT CHANGE UP (ref 7–14)
BUN SERPL-MCNC: 12 MG/DL — SIGNIFICANT CHANGE UP (ref 7–23)
CALCIUM SERPL-MCNC: 8.5 MG/DL — SIGNIFICANT CHANGE UP (ref 8.4–10.5)
CHLORIDE SERPL-SCNC: 104 MMOL/L — SIGNIFICANT CHANGE UP (ref 98–107)
CO2 SERPL-SCNC: 25 MMOL/L — SIGNIFICANT CHANGE UP (ref 22–31)
CREAT SERPL-MCNC: 1.16 MG/DL — SIGNIFICANT CHANGE UP (ref 0.5–1.3)
GLUCOSE SERPL-MCNC: 137 MG/DL — HIGH (ref 70–99)
HCT VFR BLD CALC: 38.5 % — LOW (ref 39–50)
HGB BLD-MCNC: 11.5 G/DL — LOW (ref 13–17)
MCHC RBC-ENTMCNC: 29 PG — SIGNIFICANT CHANGE UP (ref 27–34)
MCHC RBC-ENTMCNC: 29.9 % — LOW (ref 32–36)
MCV RBC AUTO: 97.2 FL — SIGNIFICANT CHANGE UP (ref 80–100)
NRBC # FLD: 0 K/UL — SIGNIFICANT CHANGE UP (ref 0–0)
PLATELET # BLD AUTO: 169 K/UL — SIGNIFICANT CHANGE UP (ref 150–400)
PMV BLD: 10 FL — SIGNIFICANT CHANGE UP (ref 7–13)
POTASSIUM SERPL-MCNC: 3.9 MMOL/L — SIGNIFICANT CHANGE UP (ref 3.5–5.3)
POTASSIUM SERPL-SCNC: 3.9 MMOL/L — SIGNIFICANT CHANGE UP (ref 3.5–5.3)
RBC # BLD: 3.96 M/UL — LOW (ref 4.2–5.8)
RBC # FLD: 14 % — SIGNIFICANT CHANGE UP (ref 10.3–14.5)
SODIUM SERPL-SCNC: 140 MMOL/L — SIGNIFICANT CHANGE UP (ref 135–145)
WBC # BLD: 8.6 K/UL — SIGNIFICANT CHANGE UP (ref 3.8–10.5)
WBC # FLD AUTO: 8.6 K/UL — SIGNIFICANT CHANGE UP (ref 3.8–10.5)

## 2019-09-06 PROCEDURE — 99223 1ST HOSP IP/OBS HIGH 75: CPT

## 2019-09-06 PROCEDURE — 88307 TISSUE EXAM BY PATHOLOGIST: CPT | Mod: 26

## 2019-09-06 PROCEDURE — 55821 REMOVAL OF PROSTATE: CPT

## 2019-09-06 RX ORDER — HYDROMORPHONE HYDROCHLORIDE 2 MG/ML
0.5 INJECTION INTRAMUSCULAR; INTRAVENOUS; SUBCUTANEOUS
Refills: 0 | Status: DISCONTINUED | OUTPATIENT
Start: 2019-09-06 | End: 2019-09-10

## 2019-09-06 RX ORDER — CIPROFLOXACIN LACTATE 400MG/40ML
400 VIAL (ML) INTRAVENOUS EVERY 12 HOURS
Refills: 0 | Status: DISCONTINUED | OUTPATIENT
Start: 2019-09-07 | End: 2019-09-10

## 2019-09-06 RX ORDER — SENNA PLUS 8.6 MG/1
2 TABLET ORAL AT BEDTIME
Refills: 0 | Status: DISCONTINUED | OUTPATIENT
Start: 2019-09-06 | End: 2019-09-10

## 2019-09-06 RX ORDER — HEPARIN SODIUM 5000 [USP'U]/ML
5000 INJECTION INTRAVENOUS; SUBCUTANEOUS EVERY 8 HOURS
Refills: 0 | Status: DISCONTINUED | OUTPATIENT
Start: 2019-09-06 | End: 2019-09-10

## 2019-09-06 RX ORDER — CIPROFLOXACIN LACTATE 400MG/40ML
VIAL (ML) INTRAVENOUS
Refills: 0 | Status: DISCONTINUED | OUTPATIENT
Start: 2019-09-06 | End: 2019-09-10

## 2019-09-06 RX ORDER — DOCUSATE SODIUM 100 MG
100 CAPSULE ORAL THREE TIMES A DAY
Refills: 0 | Status: DISCONTINUED | OUTPATIENT
Start: 2019-09-06 | End: 2019-09-10

## 2019-09-06 RX ORDER — PETROLATUM,WHITE
1 JELLY (GRAM) TOPICAL
Refills: 0 | Status: DISCONTINUED | OUTPATIENT
Start: 2019-09-06 | End: 2019-09-10

## 2019-09-06 RX ORDER — BUDESONIDE, MICRONIZED 100 %
0.25 POWDER (GRAM) MISCELLANEOUS
Refills: 0 | Status: DISCONTINUED | OUTPATIENT
Start: 2019-09-06 | End: 2019-09-07

## 2019-09-06 RX ORDER — FLUOCINONIDE/EMOLLIENT BASE 0.05 %
1 CREAM (GRAM) TOPICAL
Refills: 0 | Status: DISCONTINUED | OUTPATIENT
Start: 2019-09-06 | End: 2019-09-06

## 2019-09-06 RX ORDER — NALOXONE HYDROCHLORIDE 4 MG/.1ML
0.1 SPRAY NASAL
Refills: 0 | Status: DISCONTINUED | OUTPATIENT
Start: 2019-09-06 | End: 2019-09-10

## 2019-09-06 RX ORDER — FLUTICASONE PROPIONATE 220 MCG
2 AEROSOL WITH ADAPTER (GRAM) INHALATION
Refills: 0 | Status: DISCONTINUED | OUTPATIENT
Start: 2019-09-06 | End: 2019-09-06

## 2019-09-06 RX ORDER — HYDROMORPHONE HYDROCHLORIDE 2 MG/ML
0.5 INJECTION INTRAMUSCULAR; INTRAVENOUS; SUBCUTANEOUS
Refills: 0 | Status: DISCONTINUED | OUTPATIENT
Start: 2019-09-06 | End: 2019-09-06

## 2019-09-06 RX ORDER — OXYBUTYNIN CHLORIDE 5 MG
5 TABLET ORAL EVERY 6 HOURS
Refills: 0 | Status: DISCONTINUED | OUTPATIENT
Start: 2019-09-06 | End: 2019-09-10

## 2019-09-06 RX ORDER — CIPROFLOXACIN LACTATE 400MG/40ML
400 VIAL (ML) INTRAVENOUS ONCE
Refills: 0 | Status: COMPLETED | OUTPATIENT
Start: 2019-09-06 | End: 2019-09-06

## 2019-09-06 RX ORDER — ONDANSETRON 8 MG/1
4 TABLET, FILM COATED ORAL EVERY 6 HOURS
Refills: 0 | Status: DISCONTINUED | OUTPATIENT
Start: 2019-09-06 | End: 2019-09-10

## 2019-09-06 RX ADMIN — Medication 200 MILLIGRAM(S): at 14:16

## 2019-09-06 RX ADMIN — Medication 1 APPLICATION(S): at 22:30

## 2019-09-06 RX ADMIN — SODIUM CHLORIDE 3 MILLILITER(S): 9 INJECTION INTRAMUSCULAR; INTRAVENOUS; SUBCUTANEOUS at 22:03

## 2019-09-06 RX ADMIN — SODIUM CHLORIDE 125 MILLILITER(S): 9 INJECTION, SOLUTION INTRAVENOUS at 11:15

## 2019-09-06 RX ADMIN — HEPARIN SODIUM 5000 UNIT(S): 5000 INJECTION INTRAVENOUS; SUBCUTANEOUS at 22:30

## 2019-09-06 RX ADMIN — HEPARIN SODIUM 5000 UNIT(S): 5000 INJECTION INTRAVENOUS; SUBCUTANEOUS at 14:16

## 2019-09-06 RX ADMIN — SODIUM CHLORIDE 125 MILLILITER(S): 9 INJECTION, SOLUTION INTRAVENOUS at 22:31

## 2019-09-06 RX ADMIN — SODIUM CHLORIDE 125 MILLILITER(S): 9 INJECTION, SOLUTION INTRAVENOUS at 16:00

## 2019-09-06 RX ADMIN — SODIUM CHLORIDE 3 MILLILITER(S): 9 INJECTION INTRAMUSCULAR; INTRAVENOUS; SUBCUTANEOUS at 14:09

## 2019-09-06 NOTE — CONSULT NOTE ADULT - ASSESSMENT
78 M PMH Prostate ca (dx 20 years ago) no chemo/RT in past but with rising PSA, eczema poorly responsive to topical emollients, Zenker's diverticulum c/b dysphagia and intermittent aspiration, facial zoster, recent admission to [facility] for obstructive uropathy, admitted for open suprapubic prostatectomy.

## 2019-09-06 NOTE — CONSULT NOTE ADULT - PROBLEM SELECTOR RECOMMENDATION 9
s/p open suprapubic prostatectomy  pain control w/ Dilaudid IV PCA pump   await GI function, bowel regimen   Incentive spirometry   c/w Lopez   management of ABEL drain as per  HSQ

## 2019-09-06 NOTE — CONSULT NOTE ADULT - SUBJECTIVE AND OBJECTIVE BOX
HPI:  78 M PMH Prostate ca (dx 20 years ago) no chemo/RT in past but with rising PSA, eczema poorly responsive to topical emollients, Zenker's diverticulum c/b dysphagia and intermittent aspiration, facial zoster, recent admission to [facility] for obstructive uropathy, admitted for open suprapubic prostatectomy.   enlarge prostate with lower unitary tract symptoms. Pt reports incomplete bladder emptying and urinary frequency.    PAST MEDICAL & SURGICAL HISTORY:  Thyroid nodule: s/p radioactive idione tx  Abnormal EKG  Enlarged prostate with lower urinary tract symptoms (LUTS)  Shingles: 03/2019  Chronic obstructive asthma: denies recent hospitalizations or intubations  Prostate CA: no intervention  Eczema  No significant past surgical history      Review of Systems:   CONSTITUTIONAL: No fever, weight loss, or fatigue  EYES: No eye pain, visual disturbances, or discharge  ENMT:  No difficulty hearing, tinnitus, vertigo; No sinus or throat pain  NECK: No pain or stiffness  BREASTS: No pain, masses, or nipple discharge  RESPIRATORY: No cough, wheezing, chills or hemoptysis; No shortness of breath  CARDIOVASCULAR: No chest pain, palpitations, dizziness, or leg swelling  GASTROINTESTINAL: No abdominal or epigastric pain. No nausea, vomiting, or hematemesis; No diarrhea or constipation. No melena or hematochezia.  GENITOURINARY: No dysuria, frequency, hematuria, or incontinence  NEUROLOGICAL: No headaches, memory loss, loss of strength, numbness, or tremors  SKIN: No itching, burning, rashes, or lesions   LYMPH NODES: No enlarged glands  ENDOCRINE: No heat or cold intolerance; No hair loss  MUSCULOSKELETAL: No joint pain or swelling; No muscle, back, or extremity pain  PSYCHIATRIC: No depression, anxiety, mood swings, or difficulty sleeping  HEME/LYMPH: No easy bruising, or bleeding gums  ALLERY AND IMMUNOLOGIC: No hives or eczema    Allergies    albuterol (Unknown)    Intolerances      Social History:   Former smoker  Rare alcohol use     FAMILY HISTORY:  Denies family history of prostate cancer     HOME MEDICATIONS:  clobetasol 0.05% topical spray: Apply topically to affected area 2 times a day  Flovent HFA 44 mcg/inh inhalation aerosol: 2 puff(s) inhaled 2 times a day, As Needed      Vital Signs Last 24 Hrs  T(C): 36.4 (06 Sep 2019 12:00), Max: 36.4 (06 Sep 2019 06:30)  T(F): 97.5 (06 Sep 2019 12:00), Max: 97.5 (06 Sep 2019 06:30)  HR: 71 (06 Sep 2019 14:00) (64 - 71)  BP: 114/64 (06 Sep 2019 14:00) (109/62 - 124/72)  BP(mean): 77 (06 Sep 2019 14:00) (71 - 82)  RR: 11 (06 Sep 2019 14:00) (9 - 17)  SpO2: 99% (06 Sep 2019 14:00) (95% - 100%)  CAPILLARY BLOOD GLUCOSE        I&O's Summary    06 Sep 2019 07:01  -  06 Sep 2019 14:27  --------------------------------------------------------  IN: 620 mL / OUT: 02782 mL / NET: -56906 mL        PHYSICAL EXAM:  GENERAL: NAD  HEAD:  Atraumatic, Normocephalic  EYES: EOMI, PERRLA, conjunctiva and sclera clear  ENMT: No tonsillar erythema, exudates, or enlargement; poor dentition  NECK: Supple, No JVD  CHEST/LUNG: Clear to auscultation bilaterally   HEART: Regular rate and rhythm; No murmurs, rubs, or gallops   ABDOMEN: Soft, appropriately tender, Nondistended; Bowel sounds present, incision c/d/i. +ABEL drain  : Lopez in place   EXTREMITIES:  2+ Peripheral Pulses, No clubbing, cyanosis, or edema   LYMPH: No lymphadenopathy noted, no lymphangitis  SKIN:+ eczematous plaques on both extensor surfaces of arms, legs, back, scalp  NERVOUS SYSTEM:  Alert & Oriented X3, non-focal     LABS:                        11.5   8.60  )-----------( 169      ( 06 Sep 2019 11:50 )             38.5     09-06    140  |  104  |  12  ----------------------------<  137<H>  3.9   |  25  |  1.16    Ca    8.5      06 Sep 2019 11:50                RADIOLOGY & ADDITIONAL TESTS:    Imaging Personally Reviewed:    Consultant(s) Notes Reviewed:      Care Discussed with Consultants/Other Providers: HPI:  78 M PMH Prostate ca (dx 20 years ago) no chemo/RT in past but with rising PSA, eczema poorly responsive to topical emollients, Zenker's diverticulum c/b dysphagia and intermittent aspiration, facial zoster, recent admission to [facility] for obstructive uropathy, admitted for open suprapubic prostatectomy. Pt started having incomplete bladder emptying and urinary frequency in July, went to [facility] and was found to have urinary retention. He was discharged from [facility] w/ Lopez, failed TOV. Patient reports mild dysuria over the past week, and 2 weeks ago had hematuria after he went [employer] dancing. He underwent suprapubic prostatectomy this morning. He is currently c/o constant suprapubic pain localized to the incision site, pain is 4 out of 10 and dull, alleviated by the Dilaudid PCA pump, aggravated by palpation.     PAST MEDICAL & SURGICAL HISTORY:  Thyroid nodule: s/p radioactive idione tx  Abnormal EKG  Enlarged prostate with lower urinary tract symptoms (LUTS)  Shingles: 03/2019  Chronic obstructive asthma: denies recent hospitalizations or intubations  Prostate CA: no intervention  Eczema  No significant past surgical history      Review of Systems:   CONSTITUTIONAL: No fever, weight loss, or fatigue  EYES: No eye pain, visual disturbances, or discharge  ENMT:  No difficulty hearing, tinnitus, vertigo; No sinus or throat pain  NECK: No pain or stiffness  RESPIRATORY: No cough, wheezing, chills or hemoptysis; No shortness of breath  CARDIOVASCULAR: No chest pain, palpitations, dizziness, or leg swelling  GASTROINTESTINAL: +abdominal pain. No nausea, vomiting, or hematemesis; No diarrhea or constipation. No melena or hematochezia.  GENITOURINARY: +dysuria, +hematuria, +urinary retention   NEUROLOGICAL: No headaches, memory loss, loss of strength, numbness, or tremors  SKIN: +eczema   LYMPH NODES: No enlarged glands  ENDOCRINE: No heat or cold intolerance; No hair loss  MUSCULOSKELETAL: No joint pain or swelling; No muscle, back, or extremity pain  PSYCHIATRIC: No depression, anxiety, mood swings, or difficulty sleeping  HEME/LYMPH: No easy bruising, or bleeding gums  ALLERY AND IMMUNOLOGIC: No hives or eczema    Allergies    albuterol (Unknown)    Intolerances      Social History:   Former smoker  Rare alcohol use     FAMILY HISTORY:  Denies family history of prostate cancer     HOME MEDICATIONS:  clobetasol 0.05% topical spray: Apply topically to affected area 2 times a day  Flovent HFA 44 mcg/inh inhalation aerosol: 2 puff(s) inhaled 2 times a day, As Needed      Vital Signs Last 24 Hrs  T(C): 36.4 (06 Sep 2019 12:00), Max: 36.4 (06 Sep 2019 06:30)  T(F): 97.5 (06 Sep 2019 12:00), Max: 97.5 (06 Sep 2019 06:30)  HR: 71 (06 Sep 2019 14:00) (64 - 71)  BP: 114/64 (06 Sep 2019 14:00) (109/62 - 124/72)  BP(mean): 77 (06 Sep 2019 14:00) (71 - 82)  RR: 11 (06 Sep 2019 14:00) (9 - 17)  SpO2: 99% (06 Sep 2019 14:00) (95% - 100%)  CAPILLARY BLOOD GLUCOSE        I&O's Summary    06 Sep 2019 07:01  -  06 Sep 2019 14:27  --------------------------------------------------------  IN: 620 mL / OUT: 01047 mL / NET: -03162 mL        PHYSICAL EXAM:  GENERAL: NAD  HEAD:  Atraumatic, Normocephalic  EYES: EOMI, PERRLA, conjunctiva and sclera clear  ENMT: No tonsillar erythema, exudates, or enlargement; poor dentition  NECK: Supple, No JVD  CHEST/LUNG: Clear to auscultation bilaterally   HEART: Regular rate and rhythm; No murmurs, rubs, or gallops   ABDOMEN: Soft, appropriately tender, Nondistended; Bowel sounds present, incision c/d/i. +ABEL drain  : Lopez in place   EXTREMITIES:  2+ Peripheral Pulses, No clubbing, cyanosis, or edema   LYMPH: No lymphadenopathy noted, no lymphangitis  SKIN:+ eczematous plaques on both extensor surfaces of arms, legs, back, scalp  NERVOUS SYSTEM:  Alert & Oriented X3, non-focal     LABS:                        11.5   8.60  )-----------( 169      ( 06 Sep 2019 11:50 )             38.5     09-06    140  |  104  |  12  ----------------------------<  137<H>  3.9   |  25  |  1.16    Ca    8.5      06 Sep 2019 11:50                RADIOLOGY & ADDITIONAL TESTS:    Imaging Personally Reviewed:    Kidney/bladder US 7/2 reviewed:   Bilateral mild hydronephrosis  Urinary retention    EKG tracing reviewed and interpreted: NSR, L anterior fascicular block     Consultant(s) Notes Reviewed:      Care Discussed with Consultants/Other Providers:

## 2019-09-06 NOTE — CHART NOTE - NSCHARTNOTEFT_GEN_A_CORE
Post op Check: 78 yo POD #0 open SPP    Pt seen and examined without complaints. Pain is controlled w/ PCEA Denies SOB/CP/N/V.     Vital Signs Last 24 Hrs  T(C): 36.4 (06 Sep 2019 12:00), Max: 36.4 (06 Sep 2019 06:30)  T(F): 97.5 (06 Sep 2019 12:00), Max: 97.5 (06 Sep 2019 06:30)  HR: 71 (06 Sep 2019 14:00) (64 - 71)  BP: 114/64 (06 Sep 2019 14:00) (109/62 - 124/72)  BP(mean): 77 (06 Sep 2019 14:00) (71 - 82)  RR: 11 (06 Sep 2019 14:00) (9 - 17)  SpO2: 99% (06 Sep 2019 14:00) (95% - 100%)    I&O's Summary  CBI pink tinged  ABEL: 35 sanguinous   06 Sep 2019 07:01  -  06 Sep 2019 14:59  --------------------------------------------------------  IN: 745 mL / OUT: 23522 mL / NET: -97737 mL        Physical Exam  Gen: NAD  Pulm: No respiratory distress, clear to auscultation  CV: Reg  Abd: Dressing with small amount of strike through, soft, appropriately tender  : chang on traction, secured  Venodynes: in place                          11.5   8.60  )-----------( 169      ( 06 Sep 2019 11:50 )             38.5       09-06    140  |  104  |  12  ----------------------------<  137<H>  3.9   |  25  |  1.16    Ca    8.5      06 Sep 2019 11:50          Plan:   IVF: LR @ 125  Diet: CLD  Labs: reviewed, AM ordered  Abx: cipro  Strict I&O's  Analgesia and antiemetics as needed  DVT prophylaxis/OOB  Incentive spirometry  Post op Check: 80 yo POD #0 open SPP    Pt seen and examined c/o incisional pain which is controlled w/ PCEA Denies SOB/CP/N/V.     Vital Signs Last 24 Hrs  T(C): 36.4 (06 Sep 2019 12:00), Max: 36.4 (06 Sep 2019 06:30)  T(F): 97.5 (06 Sep 2019 12:00), Max: 97.5 (06 Sep 2019 06:30)  HR: 71 (06 Sep 2019 14:00) (64 - 71)  BP: 114/64 (06 Sep 2019 14:00) (109/62 - 124/72)  BP(mean): 77 (06 Sep 2019 14:00) (71 - 82)  RR: 11 (06 Sep 2019 14:00) (9 - 17)  SpO2: 99% (06 Sep 2019 14:00) (95% - 100%)    I&O's Summary  CBI pink tinged  ABEL: 35 sanguinous   06 Sep 2019 07:01  -  06 Sep 2019 14:59  --------------------------------------------------------  IN: 745 mL / OUT: 46717 mL / NET: -91469 mL        Physical Exam  Gen: NAD  Pulm: No respiratory distress, clear to auscultation  CV: Reg  Abd: Dressing with small amount of strike through, soft, appropriately tender  : bernardo on traction, secured  Venodynes: in place                          11.5   8.60  )-----------( 169      ( 06 Sep 2019 11:50 )             38.5       09-06    140  |  104  |  12  ----------------------------<  137<H>  3.9   |  25  |  1.16    Ca    8.5      06 Sep 2019 11:50          Plan:   IVF: LR @ 125  Diet: CLD  Labs: reviewed, AM ordered  Abx: cipro  Strict I&O's  Analgesia and antiemetics as needed  DVT prophylaxis/OOB  Incentive spirometry

## 2019-09-06 NOTE — CONSULT NOTE ADULT - PROBLEM SELECTOR RECOMMENDATION 2
Pre-op urine culture grew Klebsiella and Staph aureus   Continue w/ Cipro   F/up repeat urine culture  Recommend checking one set of blood cultures Pre-op urine culture grew Klebsiella and Staph aureus, likely associated w/ indwelling Lopez   Continue w/ Cipro   F/up repeat urine culture  Recommend checking one set of blood cultures

## 2019-09-07 LAB
ANION GAP SERPL CALC-SCNC: 8 MMO/L — SIGNIFICANT CHANGE UP (ref 7–14)
APTT BLD: 30 SEC — SIGNIFICANT CHANGE UP (ref 27.5–36.3)
BUN SERPL-MCNC: 9 MG/DL — SIGNIFICANT CHANGE UP (ref 7–23)
CALCIUM SERPL-MCNC: 8.2 MG/DL — LOW (ref 8.4–10.5)
CHLORIDE SERPL-SCNC: 97 MMOL/L — LOW (ref 98–107)
CO2 SERPL-SCNC: 30 MMOL/L — SIGNIFICANT CHANGE UP (ref 22–31)
CREAT SERPL-MCNC: 1.09 MG/DL — SIGNIFICANT CHANGE UP (ref 0.5–1.3)
GLUCOSE SERPL-MCNC: 113 MG/DL — HIGH (ref 70–99)
HCT VFR BLD CALC: 37.7 % — LOW (ref 39–50)
HGB BLD-MCNC: 11.5 G/DL — LOW (ref 13–17)
INR BLD: 1.16 — SIGNIFICANT CHANGE UP (ref 0.88–1.17)
MCHC RBC-ENTMCNC: 29.6 PG — SIGNIFICANT CHANGE UP (ref 27–34)
MCHC RBC-ENTMCNC: 30.5 % — LOW (ref 32–36)
MCV RBC AUTO: 97.2 FL — SIGNIFICANT CHANGE UP (ref 80–100)
NRBC # FLD: 0 K/UL — SIGNIFICANT CHANGE UP (ref 0–0)
PLATELET # BLD AUTO: 159 K/UL — SIGNIFICANT CHANGE UP (ref 150–400)
PMV BLD: 10.4 FL — SIGNIFICANT CHANGE UP (ref 7–13)
POTASSIUM SERPL-MCNC: 4.4 MMOL/L — SIGNIFICANT CHANGE UP (ref 3.5–5.3)
POTASSIUM SERPL-SCNC: 4.4 MMOL/L — SIGNIFICANT CHANGE UP (ref 3.5–5.3)
PROTHROM AB SERPL-ACNC: 12.9 SEC — SIGNIFICANT CHANGE UP (ref 9.8–13.1)
RBC # BLD: 3.88 M/UL — LOW (ref 4.2–5.8)
RBC # FLD: 14.3 % — SIGNIFICANT CHANGE UP (ref 10.3–14.5)
SODIUM SERPL-SCNC: 135 MMOL/L — SIGNIFICANT CHANGE UP (ref 135–145)
SPECIMEN SOURCE: SIGNIFICANT CHANGE UP
WBC # BLD: 8.36 K/UL — SIGNIFICANT CHANGE UP (ref 3.8–10.5)
WBC # FLD AUTO: 8.36 K/UL — SIGNIFICANT CHANGE UP (ref 3.8–10.5)

## 2019-09-07 PROCEDURE — 99233 SBSQ HOSP IP/OBS HIGH 50: CPT

## 2019-09-07 RX ORDER — BUDESONIDE, MICRONIZED 100 %
0.25 POWDER (GRAM) MISCELLANEOUS
Refills: 0 | Status: DISCONTINUED | OUTPATIENT
Start: 2019-09-07 | End: 2019-09-10

## 2019-09-07 RX ADMIN — Medication 200 MILLIGRAM(S): at 05:52

## 2019-09-07 RX ADMIN — SODIUM CHLORIDE 3 MILLILITER(S): 9 INJECTION INTRAMUSCULAR; INTRAVENOUS; SUBCUTANEOUS at 05:51

## 2019-09-07 RX ADMIN — HEPARIN SODIUM 5000 UNIT(S): 5000 INJECTION INTRAVENOUS; SUBCUTANEOUS at 13:30

## 2019-09-07 RX ADMIN — Medication 1 APPLICATION(S): at 09:28

## 2019-09-07 RX ADMIN — SODIUM CHLORIDE 3 MILLILITER(S): 9 INJECTION INTRAMUSCULAR; INTRAVENOUS; SUBCUTANEOUS at 13:28

## 2019-09-07 RX ADMIN — Medication 200 MILLIGRAM(S): at 17:27

## 2019-09-07 RX ADMIN — SODIUM CHLORIDE 3 MILLILITER(S): 9 INJECTION INTRAMUSCULAR; INTRAVENOUS; SUBCUTANEOUS at 21:00

## 2019-09-07 RX ADMIN — Medication 1 APPLICATION(S): at 22:28

## 2019-09-07 RX ADMIN — HEPARIN SODIUM 5000 UNIT(S): 5000 INJECTION INTRAVENOUS; SUBCUTANEOUS at 21:03

## 2019-09-07 RX ADMIN — HEPARIN SODIUM 5000 UNIT(S): 5000 INJECTION INTRAVENOUS; SUBCUTANEOUS at 05:52

## 2019-09-07 RX ADMIN — Medication 0.25 MILLIGRAM(S): at 11:22

## 2019-09-07 RX ADMIN — SODIUM CHLORIDE 75 MILLILITER(S): 9 INJECTION, SOLUTION INTRAVENOUS at 21:02

## 2019-09-07 RX ADMIN — Medication 1 APPLICATION(S): at 21:03

## 2019-09-07 RX ADMIN — SODIUM CHLORIDE 75 MILLILITER(S): 9 INJECTION, SOLUTION INTRAVENOUS at 12:21

## 2019-09-07 NOTE — PHYSICAL THERAPY INITIAL EVALUATION ADULT - GAIT DISTANCE, PT EVAL
4 steps, during ambulation, pt. presenting with bloody discharge from catheter site. Pt. denied symptoms, Pt. returned to seated position in chair and RN Marilee RUSSO alerted. Pt. returned seated in bedside chair with all tubes/lines intact, call bell in reach and in NAD. RN present in room/bed to chair

## 2019-09-07 NOTE — PROGRESS NOTE ADULT - SUBJECTIVE AND OBJECTIVE BOX
Anesthesia Pain Management Service: Day __ of Epidural    SUBJECTIVE: Patient doing well with PCEA and no problems.- disconnected this morning, witnessed.  Able to replace sterile       Pain Scale Score:   Refer to charted pain scores    THERAPY:  [x ] Epidural Bupivacaine 0.0625% and Hydromorphone  		[ ] 10 micrograms/mL	[ ] 5 micrograms/mL  [ ] Epidural Bupivacaine 0.0625% and Fentanyl - 2 micrograms/mL  [ ] Epidural Ropivacaine 0.1% plain – 1 mg/mL  [ ] Patient Controlled Regional Anesthesia (PCRA) Ropivacaine  		[ ] 0.2%			[ ] 0.1%    Demand dose __3_ lockout __15_ (minutes) Continuous Rate ___ Total: ___ Daily      MEDICATIONS  (STANDING):  AQUAPHOR (petrolatum Ointment) 1 Application(s) Topical two times a day  ciprofloxacin   IVPB 400 milliGRAM(s) IV Intermittent every 12 hours  ciprofloxacin   IVPB      clobetasol 0.05% Cream 1 Application(s) Topical two times a day  docusate sodium 100 milliGRAM(s) Oral three times a day  heparin  Injectable 5000 Unit(s) SubCutaneous every 8 hours  HYDROmorphone (10 MICROgram(s)/mL) + BUpivacaine 0.0625% in 0.9% Sodium Chloride PCEA 250 milliLiter(s) Epidural PCA Continuous  lactated ringers. 1000 milliLiter(s) (75 mL/Hr) IV Continuous <Continuous>  sodium chloride 0.9% lock flush 3 milliLiter(s) IV Push every 8 hours    MEDICATIONS  (PRN):  buDESOnide    Inhalation Suspension 0.25 milliGRAM(s) Inhalation two times a day PRN shortness of breath or wheezing  HYDROmorphone  Injectable 0.5 milliGRAM(s) IV Push every 3 hours PRN Severe breakthrough Pain (7 - 10)  HYDROmorphone (10 MICROgram(s)/mL) + BUpivacaine 0.0625% in 0.9% Sodium Chloride PCEA Rescue Clinician  Bolus 5 milliLiter(s) Epidural every 15 minutes PRN for Pain Score greater than 6  naloxone Injectable 0.1 milliGRAM(s) IV Push every 3 minutes PRN For ANY of the following changes in patient status:  A. RR LESS THAN 10 breaths per minute, B. Oxygen saturation LESS THAN 90%, C. Sedation score of 6  ondansetron Injectable 4 milliGRAM(s) IV Push every 6 hours PRN Nausea  oxybutynin 5 milliGRAM(s) Oral every 6 hours PRN Bladder spasms  senna 2 Tablet(s) Oral at bedtime PRN Constipation      OBJECTIVE:    Assessment of Catheter Site:	[ ] Left	[ ] Right  [x ] Epidural 	[ ] Femoral	      [ ] Saphenous   [ ] Supraclavicular   [ ] Other:    [x ] Dressing intact	[x ] Site non-tender	[ x] Site without erythema, discharge, edema  [x ] Epidural tubing and connection checked	[x] Gross neurological exam within normal limits  [ ] Catheter removed – tip intact		[x ] Afebrile	[ ] Febrile: ___    PT/INR - ( 07 Sep 2019 06:43 )   PT: 12.9 SEC;   INR: 1.16          PTT - ( 07 Sep 2019 06:43 )  PTT:30.0 SEC                      11.5   8.36  )-----------( 159      ( 07 Sep 2019 06:43 )             37.7     Vital Signs Last 24 Hrs  T(C): 36.8 (09-07-19 @ 05:46), Max: 36.9 (09-07-19 @ 01:33)  T(F): 98.2 (09-07-19 @ 05:46), Max: 98.4 (09-07-19 @ 01:33)  HR: 87 (09-07-19 @ 05:46) (60 - 97)  BP: 115/59 (09-07-19 @ 05:46) (103/63 - 124/65)  BP(mean): 75 (09-06-19 @ 15:00) (71 - 82)  RR: 16 (09-07-19 @ 05:46) (9 - 17)  SpO2: 96% (09-07-19 @ 05:46) (93% - 100%)      Sedation Score:	[x ] Alert	[ ] Drowsy	[ ] Arousable	[ ] Asleep	[ ] Unresponsive    Side Effects:	[x ] None	[ ] Nausea	[ ] Vomiting	[ ] Pruritus  		[ ] Weakness		[ ] Numbness	[ ] Other:    ASSESSMENT/ PLAN:    Therapy to  be:	[x ] Continue   [ ] Discontinued   [ ] Change to prn Analgesics    Documentation and Verification of current medications:  [ X ] Done	[ ] Not done, not eligible, reason:    Comments: Doing OK with epidural and may continue.

## 2019-09-07 NOTE — PROGRESS NOTE ADULT - ASSESSMENT
78 M PMH Prostate ca (dx 20 years ago) no chemo/RT in past but with rising PSA, eczema poorly responsive to topical emollients, Zenker's diverticulum c/b dysphagia and intermittent aspiration, facial zoster, recent admission to [facility] for obstructive uropathy, admitted for open suprapubic prostatectomy.      Problem/Recommendation - 1:  Problem: Benign prostatic hyperplasia with urinary obstruction. Recommendation: s/p open suprapubic prostatectomy  pain control w/ Dilaudid IV PCA pump   await GI function, bowel regimen   Incentive spirometry   c/w Lopez   management of ABEL drain as per .     Problem/Recommendation - 2:  ·  Problem: Urinary tract infection without hematuria, site unspecified.  Recommendation: Pre-op urine culture grew Klebsiella and Staph aureus, likely associated w/ indwelling Lopez - sensitive to Cipro   Continue w/ Cipro   repeat urine culture neg  Recommend checking one set of blood cultures.     Problem/Recommendation - 3:  ·  Problem: Postoperative anemia due to acute blood loss.  Recommendation: Acute blood loss anemia post-op   EBL 200cc  Monitor Hgb.      Problem/Recommendation - 4:  ·  Problem: Prostate CA.  Recommendation: s/p open suprapubic prostatectomy  f/up path.      Problem/Recommendation - 5:  ·  Problem: Chronic obstructive asthma.  Recommendation: Well controlled   Continue Flovent prn.      Problem/Recommendation - 6:  Problem: Eczema, unspecified type. Recommendation: May restart Clobetasol.     Problem/Recommendation - 7:  Problem: Zenker diverticulum. Recommendation: laryngoscopy and esophagoscopy in June was cancelled  aspiration precautions.     Problem/Recommendation - 8:  Problem: Pulmonary nodule.Recommendation: 3mm lung nodule on recent CT chest, former smoker   Recommend repeat CT in 12 months.     Problem/Recommendation - 9:  Problem: Need for prophylactic measure. Recommendation: HSQ.

## 2019-09-07 NOTE — PROGRESS NOTE ADULT - SUBJECTIVE AND OBJECTIVE BOX
Overnight events:  None    Subjective:  Pt offers no complaints, sitting at bedside, pain controlled, pt feels he no longer needs/wants PCEA, tolerating CLD, no flatus    Objective:    Vital signs  T(C): , Max: 36.9 (09-07-19 @ 01:33)  HR: 87 (09-07-19 @ 05:46)  BP: 115/59 (09-07-19 @ 05:46)  SpO2: 96% (09-07-19 @ 05:46)  Wt(kg): --    Output   CBI clear  ABEL: 22.5 SS  09-06 @ 07:01  -  09-07 @ 07:00  --------------------------------------------------------  IN: 73552 mL / OUT: 849884.5 mL / NET: -60520.5 mL    09-07 @ 07:01  -  09-07 @ 09:13  --------------------------------------------------------  IN: 70378 mL / OUT: 7200 mL / NET: 4800 mL        Gen: NAD  Abd: staples c/d/i, obese, softly distended, nontender  [facility]    Labs                        11.5   8.36  )-----------( 159      ( 07 Sep 2019 06:43 )             37.7     07 Sep 2019 06:43    135    |  97     |  9      ----------------------------<  113    4.4     |  30     |  1.09     Ca    8.2        07 Sep 2019 06:43

## 2019-09-07 NOTE — PHYSICAL THERAPY INITIAL EVALUATION ADULT - PATIENT PROFILE REVIEW, REHAB EVAL
yes/Pt. profile reviewed, consulted with GALILEA RUSSO prior to initial PT evaluation and tx, as per RN, Pt. is OK to participate in skilled therapy session

## 2019-09-07 NOTE — PROGRESS NOTE ADULT - SUBJECTIVE AND OBJECTIVE BOX
Patient is a 79y old  Male who presents with a chief complaint of obstruction     SUBJECTIVE / OVERNIGHT EVENTS: pain controlled feels well     ROS:  No HA/DZ  No Vision changes   No CP, SOB  No N/V/D  No Edema  No Rash  NO weakness, numbness    MEDICATIONS  (STANDING):  AQUAPHOR (petrolatum Ointment) 1 Application(s) Topical two times a day  ciprofloxacin   IVPB 400 milliGRAM(s) IV Intermittent every 12 hours  ciprofloxacin   IVPB      clobetasol 0.05% Cream 1 Application(s) Topical two times a day  docusate sodium 100 milliGRAM(s) Oral three times a day  heparin  Injectable 5000 Unit(s) SubCutaneous every 8 hours  HYDROmorphone (10 MICROgram(s)/mL) + BUpivacaine 0.0625% in 0.9% Sodium Chloride PCEA 250 milliLiter(s) Epidural PCA Continuous  lactated ringers. 1000 milliLiter(s) (75 mL/Hr) IV Continuous <Continuous>  sodium chloride 0.9% lock flush 3 milliLiter(s) IV Push every 8 hours    MEDICATIONS  (PRN):  buDESOnide    Inhalation Suspension 0.25 milliGRAM(s) Inhalation two times a day PRN shortness of breath or wheezing  HYDROmorphone  Injectable 0.5 milliGRAM(s) IV Push every 3 hours PRN Severe breakthrough Pain (7 - 10)  HYDROmorphone (10 MICROgram(s)/mL) + BUpivacaine 0.0625% in 0.9% Sodium Chloride PCEA Rescue Clinician  Bolus 5 milliLiter(s) Epidural every 15 minutes PRN for Pain Score greater than 6  naloxone Injectable 0.1 milliGRAM(s) IV Push every 3 minutes PRN For ANY of the following changes in patient status:  A. RR LESS THAN 10 breaths per minute, B. Oxygen saturation LESS THAN 90%, C. Sedation score of 6  ondansetron Injectable 4 milliGRAM(s) IV Push every 6 hours PRN Nausea  oxybutynin 5 milliGRAM(s) Oral every 6 hours PRN Bladder spasms  senna 2 Tablet(s) Oral at bedtime PRN Constipation      T(C): 37.3 (09-07-19 @ 09:26)  HR: 87 (09-07-19 @ 11:22)  BP: 114/66 (09-07-19 @ 09:26)  RR: 16 (09-07-19 @ 09:26)  SpO2: 93% (09-07-19 @ 11:22)  CAPILLARY BLOOD GLUCOSE        I&O's Summary    06 Sep 2019 07:01  -  07 Sep 2019 07:00  --------------------------------------------------------  IN: 68655 mL / OUT: 645032.5 mL / NET: -46708.5 mL    07 Sep 2019 07:01  -  07 Sep 2019 12:26  --------------------------------------------------------  IN: 10329 mL / OUT: 36027.5 mL / NET: 2792.5 mL        PHYSICAL EXAM:  GENERAL: NAD, well-developed, AOx3  HEAD:  Atraumatic, Normocephalic  EYES: EOMI, PERRL, conjunctiva and sclera clear  NECK: Supple, No JVD  CHEST/LUNG: Clear to auscultation bilaterally  HEART: Regular rate and rhythm; No murmurs, rubs, or gallops, No Edema  ABDOMEN: Soft, Nontender, Nondistended; Bowel sounds present, staples c/d/i   EXTREMITIES:  2+ Peripheral Pulses, No clubbing, cyanosis  PSYCH: No SI/HI  NEUROLOGY: non-focal  SKIN: No rashes or lesions  + Lopez    LABS:                        11.5   8.36  )-----------( 159      ( 07 Sep 2019 06:43 )             37.7     09-07    135  |  97<L>  |  9   ----------------------------<  113<H>  4.4   |  30  |  1.09    Ca    8.2<L>      07 Sep 2019 06:43      PT/INR - ( 07 Sep 2019 06:43 )   PT: 12.9 SEC;   INR: 1.16          PTT - ( 07 Sep 2019 06:43 )  PTT:30.0 SEC          Urine Culture  NO GROWTH - PRELIMINARY RESULTS      RADIOLOGY & ADDITIONAL TESTS:    Imaging Personally Reviewed:    Consultant(s) Notes Reviewed:      Care Discussed with Consultants/Other Providers:

## 2019-09-07 NOTE — PHYSICAL THERAPY INITIAL EVALUATION ADULT - PERTINENT HX OF CURRENT PROBLEM, REHAB EVAL
PT. is a 79 year old male admitted to [facility] due to s/p suprapubic prostatectomy. PMH: prostate Cancer, Shingles, thyroid nodule.

## 2019-09-07 NOTE — PROGRESS NOTE ADULT - ASSESSMENT
80 yo M POD #1 open SPP    -LR @ 75  -Reg diet  -Continue CBI, monitor color  -Consult pain management re: PCEA d/c  -Aspiration precautions (Zenkers divertic)  -AM labs reviewed  -PT consult  -F/U medicine  -DVT prophy, IS, OOB, ambulate 80 yo M POD #1 open SPP    -LR @ 75  -Reg diet  -Continue CBI, monitor color  -Consult pain management re: PCEA d/c  -Aspiration precautions (Zenkers divertic)  -AM labs reviewed  -F/U OR Cx: NGTD  -PT consult  -F/U medicine  -DVT prophy, IS, OOB, ambulate

## 2019-09-07 NOTE — PHYSICAL THERAPY INITIAL EVALUATION ADULT - ADDITIONAL COMMENTS
Pt. returned seated in bedside chair with all tubes/lines intact, call bell in reach and in NAD. RN bedside and present

## 2019-09-08 LAB
ANION GAP SERPL CALC-SCNC: 12 MMO/L — SIGNIFICANT CHANGE UP (ref 7–14)
APTT BLD: 30.7 SEC — SIGNIFICANT CHANGE UP (ref 27.5–36.3)
BUN SERPL-MCNC: 10 MG/DL — SIGNIFICANT CHANGE UP (ref 7–23)
CALCIUM SERPL-MCNC: 8.2 MG/DL — LOW (ref 8.4–10.5)
CHLORIDE SERPL-SCNC: 96 MMOL/L — LOW (ref 98–107)
CO2 SERPL-SCNC: 27 MMOL/L — SIGNIFICANT CHANGE UP (ref 22–31)
CREAT SERPL-MCNC: 1.14 MG/DL — SIGNIFICANT CHANGE UP (ref 0.5–1.3)
GLUCOSE SERPL-MCNC: 122 MG/DL — HIGH (ref 70–99)
HCT VFR BLD CALC: 35.4 % — LOW (ref 39–50)
HGB BLD-MCNC: 10.9 G/DL — LOW (ref 13–17)
INR BLD: 1.17 — SIGNIFICANT CHANGE UP (ref 0.88–1.17)
MCHC RBC-ENTMCNC: 29.9 PG — SIGNIFICANT CHANGE UP (ref 27–34)
MCHC RBC-ENTMCNC: 30.8 % — LOW (ref 32–36)
MCV RBC AUTO: 97.3 FL — SIGNIFICANT CHANGE UP (ref 80–100)
NRBC # FLD: 0 K/UL — SIGNIFICANT CHANGE UP (ref 0–0)
PLATELET # BLD AUTO: 154 K/UL — SIGNIFICANT CHANGE UP (ref 150–400)
PMV BLD: 10.1 FL — SIGNIFICANT CHANGE UP (ref 7–13)
POTASSIUM SERPL-MCNC: 4.3 MMOL/L — SIGNIFICANT CHANGE UP (ref 3.5–5.3)
POTASSIUM SERPL-SCNC: 4.3 MMOL/L — SIGNIFICANT CHANGE UP (ref 3.5–5.3)
PROTHROM AB SERPL-ACNC: 13.1 SEC — SIGNIFICANT CHANGE UP (ref 9.8–13.1)
RBC # BLD: 3.64 M/UL — LOW (ref 4.2–5.8)
RBC # FLD: 14 % — SIGNIFICANT CHANGE UP (ref 10.3–14.5)
SODIUM SERPL-SCNC: 135 MMOL/L — SIGNIFICANT CHANGE UP (ref 135–145)
WBC # BLD: 8.59 K/UL — SIGNIFICANT CHANGE UP (ref 3.8–10.5)
WBC # FLD AUTO: 8.59 K/UL — SIGNIFICANT CHANGE UP (ref 3.8–10.5)

## 2019-09-08 PROCEDURE — 99233 SBSQ HOSP IP/OBS HIGH 50: CPT

## 2019-09-08 RX ADMIN — SODIUM CHLORIDE 3 MILLILITER(S): 9 INJECTION INTRAMUSCULAR; INTRAVENOUS; SUBCUTANEOUS at 05:37

## 2019-09-08 RX ADMIN — SODIUM CHLORIDE 75 MILLILITER(S): 9 INJECTION, SOLUTION INTRAVENOUS at 05:47

## 2019-09-08 RX ADMIN — HEPARIN SODIUM 5000 UNIT(S): 5000 INJECTION INTRAVENOUS; SUBCUTANEOUS at 05:47

## 2019-09-08 RX ADMIN — Medication 1 APPLICATION(S): at 11:00

## 2019-09-08 RX ADMIN — Medication 200 MILLIGRAM(S): at 05:47

## 2019-09-08 RX ADMIN — Medication 200 MILLIGRAM(S): at 20:28

## 2019-09-08 RX ADMIN — HEPARIN SODIUM 5000 UNIT(S): 5000 INJECTION INTRAVENOUS; SUBCUTANEOUS at 22:27

## 2019-09-08 RX ADMIN — HEPARIN SODIUM 5000 UNIT(S): 5000 INJECTION INTRAVENOUS; SUBCUTANEOUS at 14:18

## 2019-09-08 RX ADMIN — SODIUM CHLORIDE 3 MILLILITER(S): 9 INJECTION INTRAMUSCULAR; INTRAVENOUS; SUBCUTANEOUS at 21:25

## 2019-09-08 RX ADMIN — SODIUM CHLORIDE 3 MILLILITER(S): 9 INJECTION INTRAMUSCULAR; INTRAVENOUS; SUBCUTANEOUS at 14:03

## 2019-09-08 NOTE — PROGRESS NOTE ADULT - ASSESSMENT
78 M PMH Prostate ca (dx 20 years ago) no chemo/RT in past but with rising PSA, eczema poorly responsive to topical emollients, Zenker's diverticulum c/b dysphagia and intermittent aspiration, facial zoster, recent admission to [facility] for obstructive uropathy, admitted for open suprapubic prostatectomy.      Problem/Recommendation - 1:  Problem: Benign prostatic hyperplasia with urinary obstruction. Recommendation: s/p open suprapubic prostatectomy  pain control w/ Dilaudid IV PCA pump   await GI function, bowel regimen   Incentive spirometry   c/w Lopez   management of ABEL drain as per .     Problem/Recommendation - 2:  ·  Problem: Urinary tract infection without hematuria, site unspecified.  Recommendation: Pre-op urine culture grew Klebsiella and Staph aureus, likely associated w/ indwelling Lopez - sensitive to Cipro   Continue w/ Cipro   repeat urine culture neg  Recommend checking one set of blood cultures.     Problem/Recommendation - 3:  ·  Problem: Postoperative anemia due to acute blood loss.  Recommendation: Acute blood loss anemia post-op   EBL 200cc  Monitor Hgb.      Problem/Recommendation - 4:  ·  Problem: Prostate CA.  Recommendation: s/p open suprapubic prostatectomy  f/up path.      Problem/Recommendation - 5:  ·  Problem: Chronic obstructive asthma.  Recommendation: Well controlled   Continue Flovent prn.      Problem/Recommendation - 6:  Problem: Eczema, unspecified type. Recommendation: May restart Clobetasol.     Problem/Recommendation - 7:  Problem: Zenker diverticulum. Recommendation: laryngoscopy and esophagoscopy in June was cancelled  aspiration precautions.     Problem/Recommendation - 8:  Problem: Pulmonary nodule.Recommendation: 3mm lung nodule on recent CT chest, former smoker   Recommend repeat CT in 12 months.     Problem/Recommendation - 9:  Problem: Need for prophylactic measure. Recommendation: HSQ.  awaiting PT

## 2019-09-08 NOTE — PROGRESS NOTE ADULT - ASSESSMENT
80 yo M POD #2 open SPP    -LR @ 75  -Continue CBI, monitor color  -Aspiration precautions (Zenkers divertic)  -CBC, BMP  -Awaiting PT recs following gait assessment.

## 2019-09-08 NOTE — PROGRESS NOTE ADULT - SUBJECTIVE AND OBJECTIVE BOX
Subjective:  No issues overnight.    Objectives:  T(C): 36.7 (09-08-19 @ 05:44), Max: 37.1 (09-07-19 @ 20:59)  HR: 88 (09-08-19 @ 05:44) (78 - 97)  BP: 125/72 (09-08-19 @ 05:44) (100/59 - 125/72)  RR: 16 (09-08-19 @ 05:44) (16 - 16)  SpO2: 100% (09-08-19 @ 05:44) (92% - 100%)  Wt(kg): --    09-06 @ 07:01  -  09-07 @ 07:00  --------------------------------------------------------  IN:    Continuous Bladder Irrigation: 65386 mL    lactated ringers.: 750 mL    Oral Fluid: 120 mL  Total IN: 86042 mL    OUT:    Bulb: 77.5 mL    Continuous Bladder Irrigation: 942478 mL  Total OUT: 155267.5 mL    Total NET: -22896.5 mL      09-07 @ 07:01  -  09-08 @ 06:32  --------------------------------------------------------  IN:    Continuous Bladder Irrigation: 19725 mL  Total IN: 66526 mL    OUT:    Bulb: 7.5 mL    Continuous Bladder Irrigation: 04680 mL  Total OUT: 89843.5 mL    Total NET: 1592.5 mL          Physcial Exam  GENERAL:   ABDOMEN:   GENITOURINARY:      LABS:                        11.5   8.36  )-----------( 159      ( 07 Sep 2019 06:43 )             37.7     09-07    135  |  97<L>  |  9   ----------------------------<  113<H>  4.4   |  30  |  1.09    Ca    8.2<L>      07 Sep 2019 06:43      CAPILLARY BLOOD GLUCOSE        PT/INR - ( 07 Sep 2019 06:43 )   PT: 12.9 SEC;   INR: 1.16          PTT - ( 07 Sep 2019 06:43 )  PTT:30.0 SEC  CAPILLARY BLOOD GLUCOSE            ASSESSMENT:  79y Male with Benign prostatic hyperplasia with lower urinary tract symptoms  Pulmonary nodule  Need for prophylactic measure  Zenker diverticulum  Eczema, unspecified type  Chronic obstructive asthma  Prostate CA  Postoperative anemia due to acute blood loss  Urinary tract infection without hematuria, site unspecified  Benign prostatic hyperplasia with urinary obstruction  Suprapubic prostatectomy       PLAN: Subjective:  No issues overnight.    Objectives:  T(C): 36.7 (09-08-19 @ 05:44), Max: 37.1 (09-07-19 @ 20:59)  HR: 88 (09-08-19 @ 05:44) (78 - 97)  BP: 125/72 (09-08-19 @ 05:44) (100/59 - 125/72)  RR: 16 (09-08-19 @ 05:44) (16 - 16)  SpO2: 100% (09-08-19 @ 05:44) (92% - 100%)  Wt(kg): --    09-06 @ 07:01  -  09-07 @ 07:00  --------------------------------------------------------  IN:    Continuous Bladder Irrigation: 71212 mL    lactated ringers.: 750 mL    Oral Fluid: 120 mL  Total IN: 84980 mL    OUT:    Bulb: 77.5 mL    Continuous Bladder Irrigation: 295787 mL  Total OUT: 130574.5 mL    Total NET: -45162.5 mL      09-07 @ 07:01  -  09-08 @ 06:32  --------------------------------------------------------  IN:    Continuous Bladder Irrigation: 29742 mL  Total IN: 99812 mL    OUT:    Bulb: 7.5 mL    Continuous Bladder Irrigation: 19997 mL  Total OUT: 40886.5 mL    Total NET: 1592.5 mL          Physcial Exam  GENERAL: Awake, Alert	  ABDOMEN: Soft, NT, ND, ABEL draining SS fluid.  GENITOURINARY: CBI draining clear pink      LABS:                        11.5   8.36  )-----------( 159      ( 07 Sep 2019 06:43 )             37.7     09-07    135  |  97<L>  |  9   ----------------------------<  113<H>  4.4   |  30  |  1.09    Ca    8.2<L>      07 Sep 2019 06:43      CAPILLARY BLOOD GLUCOSE        PT/INR - ( 07 Sep 2019 06:43 )   PT: 12.9 SEC;   INR: 1.16          PTT - ( 07 Sep 2019 06:43 )  PTT:30.0 SEC  CAPILLARY BLOOD GLUCOSE            ASSESSMENT:  79y Male with Benign prostatic hyperplasia with lower urinary tract symptoms  Pulmonary nodule  Need for prophylactic measure  Zenker diverticulum  Eczema, unspecified type  Chronic obstructive asthma  Prostate CA  Postoperative anemia due to acute blood loss  Urinary tract infection without hematuria, site unspecified  Benign prostatic hyperplasia with urinary obstruction  Suprapubic prostatectomy       PLAN:

## 2019-09-08 NOTE — PROGRESS NOTE ADULT - SUBJECTIVE AND OBJECTIVE BOX
Patient is a 79y old  Male who presents with a chief complaint of obstruction     SUBJECTIVE / OVERNIGHT EVENTS:    ROS:  No HA/DZ  No Vision changes   No CP, SOB  No N/V/D  No Edema  No Rash  NO weakness, numbness    MEDICATIONS  (STANDING):  AQUAPHOR (petrolatum Ointment) 1 Application(s) Topical two times a day  ciprofloxacin   IVPB 400 milliGRAM(s) IV Intermittent every 12 hours  ciprofloxacin   IVPB      clobetasol 0.05% Ointment 1 Application(s) Topical two times a day  docusate sodium 100 milliGRAM(s) Oral three times a day  heparin  Injectable 5000 Unit(s) SubCutaneous every 8 hours  HYDROmorphone (10 MICROgram(s)/mL) + BUpivacaine 0.0625% in 0.9% Sodium Chloride PCEA 250 milliLiter(s) Epidural PCA Continuous  lactated ringers. 1000 milliLiter(s) (75 mL/Hr) IV Continuous <Continuous>  sodium chloride 0.9% lock flush 3 milliLiter(s) IV Push every 8 hours    MEDICATIONS  (PRN):  buDESOnide    Inhalation Suspension 0.25 milliGRAM(s) Inhalation two times a day PRN shortness of breath or wheezing  HYDROmorphone  Injectable 0.5 milliGRAM(s) IV Push every 3 hours PRN Severe breakthrough Pain (7 - 10)  HYDROmorphone (10 MICROgram(s)/mL) + BUpivacaine 0.0625% in 0.9% Sodium Chloride PCEA Rescue Clinician  Bolus 5 milliLiter(s) Epidural every 15 minutes PRN for Pain Score greater than 6  naloxone Injectable 0.1 milliGRAM(s) IV Push every 3 minutes PRN For ANY of the following changes in patient status:  A. RR LESS THAN 10 breaths per minute, B. Oxygen saturation LESS THAN 90%, C. Sedation score of 6  ondansetron Injectable 4 milliGRAM(s) IV Push every 6 hours PRN Nausea  oxybutynin 5 milliGRAM(s) Oral every 6 hours PRN Bladder spasms  senna 2 Tablet(s) Oral at bedtime PRN Constipation      T(C): 37 (09-08-19 @ 10:39)  HR: 93 (09-08-19 @ 10:39)  BP: 118/56 (09-08-19 @ 10:39)  RR: 17 (09-08-19 @ 10:39)  SpO2: 93% (09-08-19 @ 10:39)  CAPILLARY BLOOD GLUCOSE        I&O's Summary    07 Sep 2019 07:01  -  08 Sep 2019 07:00  --------------------------------------------------------  IN: 36806 mL / OUT: 43713.5 mL / NET: 1572.5 mL    08 Sep 2019 07:01  -  08 Sep 2019 11:59  --------------------------------------------------------  IN: 67060 mL / OUT: 82425 mL / NET: -863 mL        PHYSICAL EXAM:  GENERAL: NAD, well-developed, AOx3  HEAD:  Atraumatic, Normocephalic  EYES: EOMI, PERRL, conjunctiva and sclera clear  NECK: Supple, No JVD  CHEST/LUNG: Clear to auscultation bilaterally  HEART: Regular rate and rhythm; No murmurs, rubs, or gallops, No Edema  ABDOMEN: Soft, Nontender, Nondistended; Bowel sounds present, staples c/d/i   EXTREMITIES:  2+ Peripheral Pulses, No clubbing, cyanosis  PSYCH: No SI/HI  NEUROLOGY: non-focal  SKIN: No rashes or lesions  ABEL, CBI draining     LABS:                        10.9   8.59  )-----------( 154      ( 08 Sep 2019 06:25 )             35.4     09-08    135  |  96<L>  |  10  ----------------------------<  122<H>  4.3   |  27  |  1.14    Ca    8.2<L>      08 Sep 2019 06:25      PT/INR - ( 08 Sep 2019 06:25 )   PT: 13.1 SEC;   INR: 1.17          PTT - ( 08 Sep 2019 06:25 )  PTT:30.7 SEC              RADIOLOGY & ADDITIONAL TESTS:    Imaging Personally Reviewed:    Consultant(s) Notes Reviewed:      Care Discussed with Consultants/Other Providers:

## 2019-09-08 NOTE — PROGRESS NOTE ADULT - SUBJECTIVE AND OBJECTIVE BOX
Anesthesia Pain Management Service: Day _2_ of Epidural    SUBJECTIVE: Patient doing well with PCEA and no problems.  Pain Scale Score:   Refer to charted pain scores    THERAPY:  [x ] Epidural Bupivacaine 0.0625% and Hydromorphone  		[x ] 10 micrograms/mL	[ ] 5 micrograms/mL  [ ] Epidural Bupivacaine 0.0625% and Fentanyl - 2 micrograms/mL  [ ] Epidural Ropivacaine 0.1% plain – 1 mg/mL  [ ] Patient Controlled Regional Anesthesia (PCRA) Ropivacaine  		[ ] 0.2%			[ ] 0.1%    Demand dose __3_ lockout __15_ (minutes) Continuous Rate _6__ Total: 141cc___ Daily      MEDICATIONS  (STANDING):  AQUAPHOR (petrolatum Ointment) 1 Application(s) Topical two times a day  ciprofloxacin   IVPB 400 milliGRAM(s) IV Intermittent every 12 hours  ciprofloxacin   IVPB      clobetasol 0.05% Ointment 1 Application(s) Topical two times a day  docusate sodium 100 milliGRAM(s) Oral three times a day  heparin  Injectable 5000 Unit(s) SubCutaneous every 8 hours  HYDROmorphone (10 MICROgram(s)/mL) + BUpivacaine 0.0625% in 0.9% Sodium Chloride PCEA 250 milliLiter(s) Epidural PCA Continuous  lactated ringers. 1000 milliLiter(s) (75 mL/Hr) IV Continuous <Continuous>  sodium chloride 0.9% lock flush 3 milliLiter(s) IV Push every 8 hours    MEDICATIONS  (PRN):  buDESOnide    Inhalation Suspension 0.25 milliGRAM(s) Inhalation two times a day PRN shortness of breath or wheezing  HYDROmorphone  Injectable 0.5 milliGRAM(s) IV Push every 3 hours PRN Severe breakthrough Pain (7 - 10)  HYDROmorphone (10 MICROgram(s)/mL) + BUpivacaine 0.0625% in 0.9% Sodium Chloride PCEA Rescue Clinician  Bolus 5 milliLiter(s) Epidural every 15 minutes PRN for Pain Score greater than 6  naloxone Injectable 0.1 milliGRAM(s) IV Push every 3 minutes PRN For ANY of the following changes in patient status:  A. RR LESS THAN 10 breaths per minute, B. Oxygen saturation LESS THAN 90%, C. Sedation score of 6  ondansetron Injectable 4 milliGRAM(s) IV Push every 6 hours PRN Nausea  oxybutynin 5 milliGRAM(s) Oral every 6 hours PRN Bladder spasms  senna 2 Tablet(s) Oral at bedtime PRN Constipation      OBJECTIVE:    Assessment of Catheter Site:	[ ] Left	[ ] Right  [x ] Epidural 	[ ] Femoral	      [ ] Saphenous   [ ] Supraclavicular   [ ] Other:    [x ] Dressing intact	[x ] Site non-tender	[ x] Site without erythema, discharge, edema  [x ] Epidural tubing and connection checked	[x] Gross neurological exam within normal limits  [ ] Catheter removed – tip intact		[x ] Afebrile	[ ] Febrile: ___    PT/INR - ( 08 Sep 2019 06:25 )   PT: 13.1 SEC;   INR: 1.17          PTT - ( 08 Sep 2019 06:25 )  PTT:30.7 SEC                      10.9   8.59  )-----------( 154      ( 08 Sep 2019 06:25 )             35.4     Vital Signs Last 24 Hrs  T(C): 36.7 (09-08-19 @ 05:44), Max: 37.3 (09-07-19 @ 09:26)  T(F): 98 (09-08-19 @ 05:44), Max: 99.1 (09-07-19 @ 09:26)  HR: 88 (09-08-19 @ 05:44) (78 - 100)  BP: 125/72 (09-08-19 @ 05:44) (100/59 - 125/72)  BP(mean): --  RR: 16 (09-08-19 @ 05:44) (16 - 16)  SpO2: 100% (09-08-19 @ 05:44) (92% - 100%)      Sedation Score:	[x ] Alert	[ ] Drowsy	[ ] Arousable	[ ] Asleep	[ ] Unresponsive    Side Effects:	[x ] None	[ ] Nausea	[ ] Vomiting	[ ] Pruritus  		[ ] Weakness		[ ] Numbness	[ ] Other:    ASSESSMENT/ PLAN:    Therapy to  be:	[x ] Continue   [ ] Discontinued   [ ] Change to prn Analgesics    Documentation and Verification of current medications:  [ X ] Done	[ ] Not done, not eligible, reason:    Comments: Doing OK with epidural and may continue.

## 2019-09-09 ENCOUNTER — TRANSCRIPTION ENCOUNTER (OUTPATIENT)
Age: 79
End: 2019-09-09

## 2019-09-09 DIAGNOSIS — R52 PAIN, UNSPECIFIED: ICD-10-CM

## 2019-09-09 LAB
-  CEFAZOLIN: SIGNIFICANT CHANGE UP
-  CIPROFLOXACIN: SIGNIFICANT CHANGE UP
-  GENTAMICIN: SIGNIFICANT CHANGE UP
-  LEVOFLOXACIN: SIGNIFICANT CHANGE UP
-  OXACILLIN: SIGNIFICANT CHANGE UP
-  PENICILLIN: SIGNIFICANT CHANGE UP
-  RIFAMPIN.: SIGNIFICANT CHANGE UP
-  TETRACYCLINE: SIGNIFICANT CHANGE UP
-  TRIMETHOPRIM/SULFAMETHOXAZOLE: SIGNIFICANT CHANGE UP
-  VANCOMYCIN: SIGNIFICANT CHANGE UP
ANION GAP SERPL CALC-SCNC: 13 MMO/L — SIGNIFICANT CHANGE UP (ref 7–14)
APTT BLD: 29.4 SEC — SIGNIFICANT CHANGE UP (ref 27.5–36.3)
BACTERIA UR CULT: SIGNIFICANT CHANGE UP
BUN SERPL-MCNC: 9 MG/DL — SIGNIFICANT CHANGE UP (ref 7–23)
CALCIUM SERPL-MCNC: 8.7 MG/DL — SIGNIFICANT CHANGE UP (ref 8.4–10.5)
CHLORIDE SERPL-SCNC: 95 MMOL/L — LOW (ref 98–107)
CO2 SERPL-SCNC: 28 MMOL/L — SIGNIFICANT CHANGE UP (ref 22–31)
CREAT SERPL-MCNC: 1.05 MG/DL — SIGNIFICANT CHANGE UP (ref 0.5–1.3)
GLUCOSE SERPL-MCNC: 126 MG/DL — HIGH (ref 70–99)
HCT VFR BLD CALC: 40.8 % — SIGNIFICANT CHANGE UP (ref 39–50)
HGB BLD-MCNC: 12.1 G/DL — LOW (ref 13–17)
INR BLD: 1.07 — SIGNIFICANT CHANGE UP (ref 0.88–1.17)
MCHC RBC-ENTMCNC: 28.9 PG — SIGNIFICANT CHANGE UP (ref 27–34)
MCHC RBC-ENTMCNC: 29.7 % — LOW (ref 32–36)
MCV RBC AUTO: 97.4 FL — SIGNIFICANT CHANGE UP (ref 80–100)
METHOD TYPE: SIGNIFICANT CHANGE UP
NRBC # FLD: 0 K/UL — SIGNIFICANT CHANGE UP (ref 0–0)
ORGANISM # SPEC MICROSCOPIC CNT: SIGNIFICANT CHANGE UP
ORGANISM # SPEC MICROSCOPIC CNT: SIGNIFICANT CHANGE UP
PLATELET # BLD AUTO: 192 K/UL — SIGNIFICANT CHANGE UP (ref 150–400)
PMV BLD: 10.5 FL — SIGNIFICANT CHANGE UP (ref 7–13)
POTASSIUM SERPL-MCNC: 4.1 MMOL/L — SIGNIFICANT CHANGE UP (ref 3.5–5.3)
POTASSIUM SERPL-SCNC: 4.1 MMOL/L — SIGNIFICANT CHANGE UP (ref 3.5–5.3)
PROTHROM AB SERPL-ACNC: 12.2 SEC — SIGNIFICANT CHANGE UP (ref 9.8–13.1)
RBC # BLD: 4.19 M/UL — LOW (ref 4.2–5.8)
RBC # FLD: 13.8 % — SIGNIFICANT CHANGE UP (ref 10.3–14.5)
SODIUM SERPL-SCNC: 136 MMOL/L — SIGNIFICANT CHANGE UP (ref 135–145)
WBC # BLD: 8.83 K/UL — SIGNIFICANT CHANGE UP (ref 3.8–10.5)
WBC # FLD AUTO: 8.83 K/UL — SIGNIFICANT CHANGE UP (ref 3.8–10.5)

## 2019-09-09 PROCEDURE — 99232 SBSQ HOSP IP/OBS MODERATE 35: CPT

## 2019-09-09 RX ORDER — ACETAMINOPHEN 500 MG
650 TABLET ORAL EVERY 6 HOURS
Refills: 0 | Status: DISCONTINUED | OUTPATIENT
Start: 2019-09-09 | End: 2019-09-10

## 2019-09-09 RX ORDER — HYDROMORPHONE HYDROCHLORIDE 2 MG/ML
0.5 INJECTION INTRAMUSCULAR; INTRAVENOUS; SUBCUTANEOUS
Refills: 0 | Status: DISCONTINUED | OUTPATIENT
Start: 2019-09-09 | End: 2019-09-10

## 2019-09-09 RX ORDER — OXYCODONE HYDROCHLORIDE 5 MG/1
5 TABLET ORAL
Refills: 0 | Status: DISCONTINUED | OUTPATIENT
Start: 2019-09-09 | End: 2019-09-10

## 2019-09-09 RX ORDER — OXYCODONE HYDROCHLORIDE 5 MG/1
7.5 TABLET ORAL
Refills: 0 | Status: DISCONTINUED | OUTPATIENT
Start: 2019-09-09 | End: 2019-09-10

## 2019-09-09 RX ADMIN — HEPARIN SODIUM 5000 UNIT(S): 5000 INJECTION INTRAVENOUS; SUBCUTANEOUS at 21:18

## 2019-09-09 RX ADMIN — Medication 650 MILLIGRAM(S): at 13:13

## 2019-09-09 RX ADMIN — Medication 1 APPLICATION(S): at 13:13

## 2019-09-09 RX ADMIN — Medication 100 MILLIGRAM(S): at 13:13

## 2019-09-09 RX ADMIN — HEPARIN SODIUM 5000 UNIT(S): 5000 INJECTION INTRAVENOUS; SUBCUTANEOUS at 13:13

## 2019-09-09 RX ADMIN — Medication 200 MILLIGRAM(S): at 09:35

## 2019-09-09 RX ADMIN — Medication 1 APPLICATION(S): at 10:13

## 2019-09-09 RX ADMIN — Medication 200 MILLIGRAM(S): at 21:18

## 2019-09-09 RX ADMIN — Medication 650 MILLIGRAM(S): at 18:52

## 2019-09-09 RX ADMIN — SODIUM CHLORIDE 3 MILLILITER(S): 9 INJECTION INTRAMUSCULAR; INTRAVENOUS; SUBCUTANEOUS at 13:14

## 2019-09-09 RX ADMIN — SODIUM CHLORIDE 3 MILLILITER(S): 9 INJECTION INTRAMUSCULAR; INTRAVENOUS; SUBCUTANEOUS at 05:48

## 2019-09-09 RX ADMIN — SODIUM CHLORIDE 3 MILLILITER(S): 9 INJECTION INTRAMUSCULAR; INTRAVENOUS; SUBCUTANEOUS at 21:15

## 2019-09-09 RX ADMIN — HEPARIN SODIUM 5000 UNIT(S): 5000 INJECTION INTRAVENOUS; SUBCUTANEOUS at 05:49

## 2019-09-09 NOTE — DISCHARGE NOTE NURSING/CASE MANAGEMENT/SOCIAL WORK - NSDPDISTO_GEN_ALL_CORE
Home stable, midline staples LUZ clean dry and intact, chang to leg bag drainage with QS urine, oob without difficulty, tolerating diet,/Home

## 2019-09-09 NOTE — PROGRESS NOTE ADULT - SUBJECTIVE AND OBJECTIVE BOX
Patient is a 79y old  Male who presents with a chief complaint of     SUBJECTIVE / OVERNIGHT EVENTS: Pain is controlled. +Flatus. Complaining of discomfort from Lopez.     MEDICATIONS  (STANDING):  acetaminophen   Tablet .. 650 milliGRAM(s) Oral every 6 hours  AQUAPHOR (petrolatum Ointment) 1 Application(s) Topical two times a day  ciprofloxacin   IVPB 400 milliGRAM(s) IV Intermittent every 12 hours  ciprofloxacin   IVPB      clobetasol 0.05% Ointment 1 Application(s) Topical two times a day  docusate sodium 100 milliGRAM(s) Oral three times a day  heparin  Injectable 5000 Unit(s) SubCutaneous every 8 hours  lactated ringers. 1000 milliLiter(s) (75 mL/Hr) IV Continuous <Continuous>  sodium chloride 0.9% lock flush 3 milliLiter(s) IV Push every 8 hours    MEDICATIONS  (PRN):  buDESOnide    Inhalation Suspension 0.25 milliGRAM(s) Inhalation two times a day PRN shortness of breath or wheezing  HYDROmorphone  Injectable 0.5 milliGRAM(s) IV Push every 3 hours PRN Severe breakthrough Pain (7 - 10)  HYDROmorphone  Injectable 0.5 milliGRAM(s) IV Push every 3 hours PRN Severe breakthrough Pain (7 - 10)  naloxone Injectable 0.1 milliGRAM(s) IV Push every 3 minutes PRN For ANY of the following changes in patient status:  A. RR LESS THAN 10 breaths per minute, B. Oxygen saturation LESS THAN 90%, C. Sedation score of 6  ondansetron Injectable 4 milliGRAM(s) IV Push every 6 hours PRN Nausea  oxybutynin 5 milliGRAM(s) Oral every 6 hours PRN Bladder spasms  oxyCODONE    IR 5 milliGRAM(s) Oral every 3 hours PRN Moderate Pain (4 - 6)  oxyCODONE    IR 7.5 milliGRAM(s) Oral every 3 hours PRN Severe Pain (7 - 10)  senna 2 Tablet(s) Oral at bedtime PRN Constipation      Vital Signs Last 24 Hrs  T(C): 36.8 (09 Sep 2019 09:34), Max: 37.3 (08 Sep 2019 13:52)  T(F): 98.2 (09 Sep 2019 09:34), Max: 99.2 (08 Sep 2019 13:52)  HR: 96 (09 Sep 2019 09:34) (60 - 106)  BP: 111/70 (09 Sep 2019 09:34) (107/55 - 136/82)  BP(mean): --  RR: 16 (09 Sep 2019 09:34) (15 - 18)  SpO2: 98% (09 Sep 2019 09:34) (94% - 98%)  CAPILLARY BLOOD GLUCOSE        I&O's Summary    08 Sep 2019 07:01  -  09 Sep 2019 07:00  --------------------------------------------------------  IN: 07097 mL / OUT: 04158 mL / NET: -3668 mL        PHYSICAL EXAM:  GENERAL: NAD, well-developed  HEAD:  Atraumatic, Normocephalic  EYES: EOMI, PERRLA, conjunctiva and sclera clear  NECK: Supple, No JVD  CHEST/LUNG: Clear to auscultation bilaterally; No wheeze  HEART: Regular rate and rhythm; No murmurs, rubs, or gallops  ABDOMEN: Soft, Nontender, Nondistended; Bowel sounds present. Incision c/d/i. +ABEL drain  EXTREMITIES:  2+ Peripheral Pulses, No clubbing, cyanosis, or edema  : Lopez in place w/ clear CBI   PSYCH: AAOx3  NEUROLOGY: non-focal  SKIN: + eczematous plaques on both extensor surfaces of arms, legs, back, scalp    LABS:                        12.1   8.83  )-----------( 192      ( 09 Sep 2019 06:00 )             40.8     09-09    136  |  95<L>  |  9   ----------------------------<  126<H>  4.1   |  28  |  1.05    Ca    8.7      09 Sep 2019 06:00      PT/INR - ( 09 Sep 2019 06:00 )   PT: 12.2 SEC;   INR: 1.07          PTT - ( 09 Sep 2019 06:00 )  PTT:29.4 SEC          RADIOLOGY & ADDITIONAL TESTS:    Imaging Personally Reviewed:    Consultant(s) Notes Reviewed:      Care Discussed with Consultants/Other Providers:

## 2019-09-09 NOTE — PROGRESS NOTE ADULT - SUBJECTIVE AND OBJECTIVE BOX
Anesthesia Pain Management Service    SUBJECTIVE:    Therapy:	  [ ] IV PCA	   [ x] Epidural           [ ] s/p Spinal Opoid              [ ] Postpartum infusion	  [ ] Patient controlled regional anesthesia (PCRA)    [ ] prn Analgesics    OBJECTIVE:   [ x] No new signs     [ ] Other:    Side Effects:  [x ] None			[ ] Other:    Assessment of Catheter Site:		[ ] Intact		[ ] Other:    ASSESSMENT/PLAN  [ ] Continue current therapy    [x ] Therapy changed to:    [ ] IV PCA       [ ] Epidural     [x ] prn Analgesics     Comments:

## 2019-09-09 NOTE — DISCHARGE NOTE NURSING/CASE MANAGEMENT/SOCIAL WORK - PATIENT PORTAL LINK FT
You can access the FollowMyHealth Patient Portal offered by [facility] by registering at the following website: [email or web address]. By joining [employer]’s FollowMyHealth portal, you will also be able to view your health information using other applications (apps) compatible with our system.

## 2019-09-09 NOTE — PROGRESS NOTE ADULT - SUBJECTIVE AND OBJECTIVE BOX
Anesthesia Pain Management Service: Day 4__ of Epidural    SUBJECTIVE: Patient doing well with PCEA and no problems.  Pain Scale Score: 1-2/10  Refer to charted pain scores    THERAPY:  [x ] Epidural Bupivacaine 0.0625% and Hydromorphone  		[X ] 10 micrograms/mL	[ ] 5 micrograms/mL  [ ] Epidural Bupivacaine 0.0625% and Fentanyl - 2 micrograms/mL  [ ] Epidural Ropivacaine 0.1% plain – 1 mg/mL  [ ] Patient Controlled Regional Anesthesia (PCRA) Ropivacaine  		[ ] 0.2%			[ ] 0.1%    Demand dose __3_ lockout __15_ (minutes) Continuous Rate 6___ Total: 164.50___ Daily      MEDICATIONS  (STANDING):  acetaminophen   Tablet .. 650 milliGRAM(s) Oral every 6 hours  AQUAPHOR (petrolatum Ointment) 1 Application(s) Topical two times a day  ciprofloxacin   IVPB 400 milliGRAM(s) IV Intermittent every 12 hours  ciprofloxacin   IVPB      clobetasol 0.05% Ointment 1 Application(s) Topical two times a day  docusate sodium 100 milliGRAM(s) Oral three times a day  heparin  Injectable 5000 Unit(s) SubCutaneous every 8 hours  lactated ringers. 1000 milliLiter(s) (75 mL/Hr) IV Continuous <Continuous>  sodium chloride 0.9% lock flush 3 milliLiter(s) IV Push every 8 hours    MEDICATIONS  (PRN):  buDESOnide    Inhalation Suspension 0.25 milliGRAM(s) Inhalation two times a day PRN shortness of breath or wheezing  HYDROmorphone  Injectable 0.5 milliGRAM(s) IV Push every 3 hours PRN Severe breakthrough Pain (7 - 10)  HYDROmorphone  Injectable 0.5 milliGRAM(s) IV Push every 3 hours PRN Severe breakthrough Pain (7 - 10)  naloxone Injectable 0.1 milliGRAM(s) IV Push every 3 minutes PRN For ANY of the following changes in patient status:  A. RR LESS THAN 10 breaths per minute, B. Oxygen saturation LESS THAN 90%, C. Sedation score of 6  ondansetron Injectable 4 milliGRAM(s) IV Push every 6 hours PRN Nausea  oxybutynin 5 milliGRAM(s) Oral every 6 hours PRN Bladder spasms  oxyCODONE    IR 5 milliGRAM(s) Oral every 3 hours PRN Moderate Pain (4 - 6)  oxyCODONE    IR 7.5 milliGRAM(s) Oral every 3 hours PRN Severe Pain (7 - 10)  senna 2 Tablet(s) Oral at bedtime PRN Constipation      OBJECTIVE:  Patient is sitting up in chair, comfortable.    Assessment of Catheter Site:	[ ] Left	[ ] Right  [x ] Epidural 	[ ] Femoral	      [ ] Saphenous   [ ] Supraclavicular   [ ] Other:    [x ] Dressing intact	[x ] Site non-tender	[ x] Site without erythema, discharge, edema  [x ] Epidural tubing and connection checked	[x] Gross neurological exam within normal limits  [X ] Catheter removed – tip intact		[ ] Afebrile	  [ ] Febrile: ___       [ X] see Temp under VS below)    PT/INR - ( 09 Sep 2019 06:00 )   PT: 12.2 SEC;   INR: 1.07          PTT - ( 09 Sep 2019 06:00 )  PTT:29.4 SEC                      12.1   8.83  )-----------( 192      ( 09 Sep 2019 06:00 )             40.8     Vital Signs Last 24 Hrs  T(C): 36.8 (09-09-19 @ 09:34), Max: 37.3 (09-08-19 @ 13:52)  T(F): 98.2 (09-09-19 @ 09:34), Max: 99.2 (09-08-19 @ 13:52)  HR: 96 (09-09-19 @ 09:34) (60 - 106)  BP: 111/70 (09-09-19 @ 09:34) (107/55 - 136/82)  BP(mean): --  RR: 16 (09-09-19 @ 09:34) (15 - 18)  SpO2: 98% (09-09-19 @ 09:34) (94% - 98%)      Sedation Score:	[x ] Alert	[ ] Drowsy	[ ] Arousable	[ ] Asleep	[ ] Unresponsive    Side Effects:	[x ] None	[ ] Nausea	[ ] Vomiting	[ ] Pruritus  		[ ] Weakness		[ ] Numbness	[ ] Other:    ASSESSMENT/ PLAN:    Therapy to  be:	[ ] Continue   [ X] Discontinued   [ X] Change to prn Analgesics    Documentation and Verification of current medications:  [ X ] Done	[ ] Not done, not eligible, reason:    Comments: Discussed patient with Urology team and wants epidural removed. Changed to IV/oral opioid and/or non-opioid Adjuvant analgesics to be used at this point.

## 2019-09-09 NOTE — PROGRESS NOTE ADULT - SUBJECTIVE AND OBJECTIVE BOX
POD #3  Afeb 132/80 94 96%RA    Pt has no c/o  Abd- soft NT ND; + flatus      wounds C&D  Lopez/ CBI clear  ABEL 0  Mica reg diet  Has PCEA

## 2019-09-09 NOTE — DISCHARGE NOTE NURSING/CASE MANAGEMENT/SOCIAL WORK - NSDCPECAREGIVERED_GEN_ALL_CORE
carenotes on prostatectomy, d/c medications, side effects pamphlet, managing pain handout and chang and leg bag care handouts carenotes on prostatectomy, d/c medications, side effects pamphlet, managing pain handout and chang and leg bag care handouts given to pt with return demonstration

## 2019-09-09 NOTE — DISCHARGE NOTE NURSING/CASE MANAGEMENT/SOCIAL WORK - NSDCPNINST_GEN_ALL_CORE
Notify Dr Dhillon if you experience any increase in pain not relieved with pain medication, any redness, drainage or swelling around incisions any nausea vomiting of fever >100.5.  Lopez and leg bag care as instructed.  Drink plenty of fluids.  no heavy lifting or straining.  Use over the counter stool softeners to assist with constipation which can be a side effect of your pain medication.

## 2019-09-10 ENCOUNTER — TRANSCRIPTION ENCOUNTER (OUTPATIENT)
Age: 79
End: 2019-09-10

## 2019-09-10 VITALS
DIASTOLIC BLOOD PRESSURE: 79 MMHG | OXYGEN SATURATION: 98 % | RESPIRATION RATE: 17 BRPM | TEMPERATURE: 98 F | SYSTOLIC BLOOD PRESSURE: 137 MMHG | HEART RATE: 77 BPM

## 2019-09-10 LAB
ANION GAP SERPL CALC-SCNC: 11 MMO/L — SIGNIFICANT CHANGE UP (ref 7–14)
BUN SERPL-MCNC: 9 MG/DL — SIGNIFICANT CHANGE UP (ref 7–23)
CALCIUM SERPL-MCNC: 8.8 MG/DL — SIGNIFICANT CHANGE UP (ref 8.4–10.5)
CHLORIDE SERPL-SCNC: 101 MMOL/L — SIGNIFICANT CHANGE UP (ref 98–107)
CO2 SERPL-SCNC: 27 MMOL/L — SIGNIFICANT CHANGE UP (ref 22–31)
CREAT FLD-MCNC: 1.05 MG/DL — SIGNIFICANT CHANGE UP
CREAT SERPL-MCNC: 1.01 MG/DL — SIGNIFICANT CHANGE UP (ref 0.5–1.3)
GLUCOSE SERPL-MCNC: 135 MG/DL — HIGH (ref 70–99)
POTASSIUM SERPL-MCNC: 3.9 MMOL/L — SIGNIFICANT CHANGE UP (ref 3.5–5.3)
POTASSIUM SERPL-SCNC: 3.9 MMOL/L — SIGNIFICANT CHANGE UP (ref 3.5–5.3)
SODIUM SERPL-SCNC: 139 MMOL/L — SIGNIFICANT CHANGE UP (ref 135–145)
SPECIMEN SOURCE: SIGNIFICANT CHANGE UP

## 2019-09-10 PROCEDURE — 99232 SBSQ HOSP IP/OBS MODERATE 35: CPT

## 2019-09-10 PROCEDURE — 99238 HOSP IP/OBS DSCHRG MGMT 30/<: CPT

## 2019-09-10 RX ORDER — MOXIFLOXACIN HYDROCHLORIDE TABLETS, 400 MG 400 MG/1
1 TABLET, FILM COATED ORAL
Qty: 20 | Refills: 0
Start: 2019-09-10 | End: 2019-09-19

## 2019-09-10 RX ORDER — OXYCODONE HYDROCHLORIDE 5 MG/1
1 TABLET ORAL
Qty: 20 | Refills: 0
Start: 2019-09-10 | End: 2019-09-14

## 2019-09-10 RX ORDER — ACETAMINOPHEN 500 MG
2 TABLET ORAL
Qty: 0 | Refills: 0 | DISCHARGE
Start: 2019-09-10

## 2019-09-10 RX ADMIN — Medication 200 MILLIGRAM(S): at 09:05

## 2019-09-10 RX ADMIN — Medication 650 MILLIGRAM(S): at 12:03

## 2019-09-10 RX ADMIN — HEPARIN SODIUM 5000 UNIT(S): 5000 INJECTION INTRAVENOUS; SUBCUTANEOUS at 05:49

## 2019-09-10 RX ADMIN — SODIUM CHLORIDE 3 MILLILITER(S): 9 INJECTION INTRAMUSCULAR; INTRAVENOUS; SUBCUTANEOUS at 13:05

## 2019-09-10 RX ADMIN — Medication 1 APPLICATION(S): at 09:08

## 2019-09-10 RX ADMIN — Medication 650 MILLIGRAM(S): at 05:54

## 2019-09-10 RX ADMIN — SODIUM CHLORIDE 3 MILLILITER(S): 9 INJECTION INTRAMUSCULAR; INTRAVENOUS; SUBCUTANEOUS at 05:48

## 2019-09-10 RX ADMIN — Medication 650 MILLIGRAM(S): at 17:06

## 2019-09-10 NOTE — DISCHARGE NOTE PROVIDER - NSDCCPCAREPLAN_GEN_ALL_CORE_FT
PRINCIPAL DISCHARGE DIAGNOSIS  Diagnosis: Benign prostatic hyperplasia with urinary obstruction  Assessment and Plan of Treatment: Drink plenty of fluids.  No heavy lifting (greater than 10 pounds) or straining for 4 to 6 weeks.  You may shower.  Change dressing at drain site daily or as needed until dry. Do not drive while taking pain meds. Lopez care as instructed.   's  office will call  to schedule a follow up appointment.  Call the office if you have fever greater than 101, difficulty urinating, pain not relieved with pain medication, nausea/vomiting..

## 2019-09-10 NOTE — DISCHARGE NOTE PROVIDER - CARE PROVIDER_API CALL
Vira Dhillon)  Urology  [address] [place] [address], [place] [address]  Phone: [phone]  Fax: [phone]  Follow Up Time: 1 week

## 2019-09-10 NOTE — PROGRESS NOTE ADULT - PROBLEM SELECTOR PLAN 2
Post-op pain s/p PCA pump  Now on PO Oxycodone w/ Dilaudid IV prn breakthrough
Post-op pain s/p PCA pump  Now on PO Oxycodone prn

## 2019-09-10 NOTE — PROGRESS NOTE ADULT - ASSESSMENT
78 M PMH Prostate ca (dx 20 years ago) no chemo/RT in past but with rising PSA, eczema poorly responsive to topical emollients, Zenker's diverticulum c/b dysphagia and intermittent aspiration, facial zoster, recent admission to [facility] for obstructive uropathy, admitted for open suprapubic prostatectomy.

## 2019-09-10 NOTE — PROGRESS NOTE ADULT - PROBLEM SELECTOR PROBLEM 1
Benign prostatic hyperplasia with urinary obstruction
Benign prostatic hyperplasia with urinary obstruction

## 2019-09-10 NOTE — PROGRESS NOTE ADULT - PROBLEM SELECTOR PLAN 4
Acute blood loss anemia post-op   EBL 200cc  Monitor Hgb.
Acute blood loss anemia post-op   EBL 200cc  Monitor Hgb.

## 2019-09-10 NOTE — PROGRESS NOTE ADULT - PROBLEM SELECTOR PLAN 3
Pre-op urine culture grew Klebsiella and Staph aureus, likely associated w/ indwelling Lopez   Repeat urine culture 9/6 grew staph aureus, f/up sensitivities    Continue w/ Cipro
Pre-op urine culture grew Klebsiella and Staph aureus, likely associated w/ indwelling Lopez   Repeat urine culture 9/6 grew staph aureus  Continue w/ Cipro

## 2019-09-10 NOTE — PROGRESS NOTE ADULT - PROVIDER SPECIALTY LIST ADULT
Anesthesia
Hospitalist
Pain Medicine
Urology
Hospitalist

## 2019-09-10 NOTE — PROGRESS NOTE ADULT - PROBLEM SELECTOR PLAN 8
laryngoscopy and esophagoscopy in June was cancelled  aspiration precautions.
laryngoscopy and esophagoscopy in June was cancelled  aspiration precautions  outpatient ENT f/up

## 2019-09-10 NOTE — PROGRESS NOTE ADULT - PROBLEM SELECTOR PLAN 9
3mm lung nodule on recent CT chest, former smoker   Recommend repeat CT in 12 months.
3mm lung nodule on recent CT chest, former smoker   Recommend repeat CT in 12 months.

## 2019-09-10 NOTE — DISCHARGE NOTE PROVIDER - HOSPITAL COURSE
Pt had open SPP 4 days ago; did well; janet reg diet; OOB; CBI off this AM and urine clear; ABEL removed; home today; f/u next Tuesday for chang and staple removal

## 2019-09-10 NOTE — DISCHARGE NOTE PROVIDER - NSDCACTIVITY_GEN_ALL_CORE
Walking - Indoors allowed/Driving allowed/No heavy lifting/straining/Showering allowed/Walking - Outdoors allowed/Stairs allowed

## 2019-09-10 NOTE — PROGRESS NOTE ADULT - SUBJECTIVE AND OBJECTIVE BOX
Patient is a 79y old  Male who presents with a chief complaint of BPH (10 Sep 2019 11:02)      SUBJECTIVE / OVERNIGHT EVENTS: No new complaints.     MEDICATIONS  (STANDING):  acetaminophen   Tablet .. 650 milliGRAM(s) Oral every 6 hours  AQUAPHOR (petrolatum Ointment) 1 Application(s) Topical two times a day  ciprofloxacin   IVPB 400 milliGRAM(s) IV Intermittent every 12 hours  ciprofloxacin   IVPB      clobetasol 0.05% Ointment 1 Application(s) Topical two times a day  docusate sodium 100 milliGRAM(s) Oral three times a day  heparin  Injectable 5000 Unit(s) SubCutaneous every 8 hours  sodium chloride 0.9% lock flush 3 milliLiter(s) IV Push every 8 hours    MEDICATIONS  (PRN):  buDESOnide    Inhalation Suspension 0.25 milliGRAM(s) Inhalation two times a day PRN shortness of breath or wheezing  HYDROmorphone  Injectable 0.5 milliGRAM(s) IV Push every 3 hours PRN Severe breakthrough Pain (7 - 10)  HYDROmorphone  Injectable 0.5 milliGRAM(s) IV Push every 3 hours PRN Severe breakthrough Pain (7 - 10)  naloxone Injectable 0.1 milliGRAM(s) IV Push every 3 minutes PRN For ANY of the following changes in patient status:  A. RR LESS THAN 10 breaths per minute, B. Oxygen saturation LESS THAN 90%, C. Sedation score of 6  ondansetron Injectable 4 milliGRAM(s) IV Push every 6 hours PRN Nausea  oxybutynin 5 milliGRAM(s) Oral every 6 hours PRN Bladder spasms  oxyCODONE    IR 5 milliGRAM(s) Oral every 3 hours PRN Moderate Pain (4 - 6)  oxyCODONE    IR 7.5 milliGRAM(s) Oral every 3 hours PRN Severe Pain (7 - 10)  senna 2 Tablet(s) Oral at bedtime PRN Constipation      Vital Signs Last 24 Hrs  T(C): 36.6 (10 Sep 2019 10:00), Max: 37 (09 Sep 2019 13:17)  T(F): 97.9 (10 Sep 2019 10:00), Max: 98.6 (09 Sep 2019 13:17)  HR: 75 (10 Sep 2019 10:00) (72 - 89)  BP: 120/72 (10 Sep 2019 10:00) (111/68 - 126/74)  BP(mean): --  RR: 16 (10 Sep 2019 10:00) (15 - 18)  SpO2: 96% (10 Sep 2019 10:00) (95% - 98%)  CAPILLARY BLOOD GLUCOSE        I&O's Summary    09 Sep 2019 07:01  -  10 Sep 2019 07:00  --------------------------------------------------------  IN: 6000 mL / OUT: 9618.5 mL / NET: -3618.5 mL    10 Sep 2019 07:01  -  10 Sep 2019 12:29  --------------------------------------------------------  IN: 0 mL / OUT: 100 mL / NET: -100 mL        PHYSICAL EXAM:  GENERAL: NAD  HEAD:  Atraumatic, Normocephalic  EYES: EOMI, PERRLA, conjunctiva and sclera clear  NECK: Supple, No JVD  CHEST/LUNG: Clear to auscultation bilaterally; No wheeze  HEART: Regular rate and rhythm; No murmurs, rubs, or gallops  ABDOMEN: Soft, Nontender, Nondistended; Bowel sounds present. Incision c/d/i  EXTREMITIES:  2+ Peripheral Pulses, No clubbing, cyanosis, or edema  : Lopez in place  PSYCH: AAOx3  NEUROLOGY: non-focal  SKIN: + eczematous plaques on both extensor surfaces of arms, legs, back, scalp    LABS:                        12.1   8.83  )-----------( 192      ( 09 Sep 2019 06:00 )             40.8     09-10    139  |  101  |  9   ----------------------------<  135<H>  3.9   |  27  |  1.01    Ca    8.8      10 Sep 2019 06:10      PT/INR - ( 09 Sep 2019 06:00 )   PT: 12.2 SEC;   INR: 1.07          PTT - ( 09 Sep 2019 06:00 )  PTT:29.4 SEC          RADIOLOGY & ADDITIONAL TESTS:    Imaging Personally Reviewed:    Consultant(s) Notes Reviewed:      Care Discussed with Consultants/Other Providers:

## 2019-09-10 NOTE — PROGRESS NOTE ADULT - ASSESSMENT
80 yo M POD #4 open SPP    Plan:  - d/c BEATRIZ  - ABEL velazquez  - СВЕТЛАНАOB  - home later with bernardo

## 2019-09-10 NOTE — PROGRESS NOTE ADULT - SUBJECTIVE AND OBJECTIVE BOX
POD #4  Afeb 125/80  82  95%RA  Pt has no c/o  Abd- soft NT ND; + flatus     wounds C&D  Lopez/ CBI clear  ABEL 15

## 2019-09-10 NOTE — PROGRESS NOTE ADULT - PROBLEM SELECTOR PLAN 1
s/p open suprapubic prostatectomy  Incentive spirometry   c/w Lopez, s/p CBI  management of ABEL drain as per .
s/p open suprapubic prostatectomy  Incentive spirometry   c/w Lopez and CBI  management of ABEL drain as per .

## 2019-09-11 ENCOUNTER — MOBILE ON CALL (OUTPATIENT)
Age: 79
End: 2019-09-11

## 2019-09-11 PROBLEM — N40.1 BENIGN PROSTATIC HYPERPLASIA WITH LOWER URINARY TRACT SYMPTOMS: Chronic | Status: ACTIVE | Noted: 2019-08-27

## 2019-09-11 PROBLEM — J44.9 CHRONIC OBSTRUCTIVE PULMONARY DISEASE, UNSPECIFIED: Chronic | Status: ACTIVE | Noted: 2019-04-22

## 2019-09-11 PROBLEM — R94.31 ABNORMAL ELECTROCARDIOGRAM [ECG] [EKG]: Chronic | Status: ACTIVE | Noted: 2019-08-27

## 2019-09-12 LAB — SURGICAL PATHOLOGY STUDY: SIGNIFICANT CHANGE UP

## 2019-09-17 ENCOUNTER — APPOINTMENT (OUTPATIENT)
Dept: UROLOGY | Facility: CLINIC | Age: 79
End: 2019-09-17
Payer: MEDICARE

## 2019-09-17 VITALS
TEMPERATURE: 97.6 F | HEIGHT: 69 IN | OXYGEN SATURATION: 99 % | BODY MASS INDEX: 30.21 KG/M2 | SYSTOLIC BLOOD PRESSURE: 120 MMHG | DIASTOLIC BLOOD PRESSURE: 78 MMHG | HEART RATE: 90 BPM | WEIGHT: 204 LBS

## 2019-09-17 PROCEDURE — 99024 POSTOP FOLLOW-UP VISIT: CPT

## 2019-09-17 NOTE — PHYSICAL EXAM
[General Appearance - Well Developed] : well developed [General Appearance - Well Nourished] : well nourished [Normal Appearance] : normal appearance [Well Groomed] : well groomed [General Appearance - In No Acute Distress] : no acute distress [Abdomen Soft] : soft [Abdomen Tenderness] : non-tender [Costovertebral Angle Tenderness] : no ~M costovertebral angle tenderness [Urinary Bladder Findings] : the bladder was normal on palpation [Urethral Meatus] : meatus normal [Testes Mass (___cm)] : there were no testicular masses [Scrotum] : the scrotum was normal [No Prostate Nodules] : no prostate nodules [Edema] : no peripheral edema [] : no respiratory distress [Respiration, Rhythm And Depth] : normal respiratory rhythm and effort [Exaggerated Use Of Accessory Muscles For Inspiration] : no accessory muscle use [Oriented To Time, Place, And Person] : oriented to person, place, and time [Affect] : the affect was normal [Mood] : the mood was normal [Not Anxious] : not anxious [Normal Station and Gait] : the gait and station were normal for the patient's age [No Focal Deficits] : no focal deficits [No Palpable Adenopathy] : no palpable adenopathy

## 2019-09-25 ENCOUNTER — RX CHANGE (OUTPATIENT)
Age: 79
End: 2019-09-25

## 2019-09-26 ENCOUNTER — APPOINTMENT (OUTPATIENT)
Dept: UROLOGY | Facility: CLINIC | Age: 79
End: 2019-09-26
Payer: MEDICARE

## 2019-09-26 PROCEDURE — 99024 POSTOP FOLLOW-UP VISIT: CPT

## 2019-09-27 LAB
ANION GAP SERPL CALC-SCNC: 12 MMOL/L
BUN SERPL-MCNC: 17 MG/DL
CALCIUM SERPL-MCNC: 9.3 MG/DL
CHLORIDE SERPL-SCNC: 107 MMOL/L
CO2 SERPL-SCNC: 26 MMOL/L
CREAT SERPL-MCNC: 1.2 MG/DL
GLUCOSE SERPL-MCNC: 108 MG/DL
POTASSIUM SERPL-SCNC: 4.6 MMOL/L
PSA FREE FLD-MCNC: 6 %
PSA FREE SERPL-MCNC: 1.94 NG/ML
PSA SERPL-MCNC: 30.4 NG/ML
SODIUM SERPL-SCNC: 145 MMOL/L

## 2019-10-04 LAB — FUNGUS SPEC QL CULT: SIGNIFICANT CHANGE UP

## 2019-10-24 ENCOUNTER — APPOINTMENT (OUTPATIENT)
Dept: DERMATOLOGY | Facility: CLINIC | Age: 79
End: 2019-10-24
Payer: MEDICARE

## 2019-10-24 DIAGNOSIS — R21 RASH AND OTHER NONSPECIFIC SKIN ERUPTION: ICD-10-CM

## 2019-10-24 PROCEDURE — 99214 OFFICE O/P EST MOD 30 MIN: CPT

## 2019-10-24 NOTE — HISTORY OF PRESENT ILLNESS
[de-identified] : The patient has been fit in for urgent appointment. \par c/o problem on R foot, Hx eczema in past

## 2019-10-24 NOTE — ASSESSMENT
[FreeTextEntry1] : Bulla R foot;  appears traumatic; possibly neuropathic; \par I&D, drained in office, dressed;\par does not appear to be eczema related;  If continues to recur, will need Podiatry/orthotic eval; \par

## 2019-10-28 ENCOUNTER — APPOINTMENT (OUTPATIENT)
Dept: OTOLARYNGOLOGY | Facility: CLINIC | Age: 79
End: 2019-10-28
Payer: MEDICARE

## 2019-10-28 VITALS
SYSTOLIC BLOOD PRESSURE: 133 MMHG | DIASTOLIC BLOOD PRESSURE: 75 MMHG | OXYGEN SATURATION: 99 % | HEART RATE: 90 BPM | HEIGHT: 69 IN | RESPIRATION RATE: 16 BRPM

## 2019-10-28 PROCEDURE — 31575 DIAGNOSTIC LARYNGOSCOPY: CPT

## 2019-10-28 PROCEDURE — 99214 OFFICE O/P EST MOD 30 MIN: CPT | Mod: 25

## 2019-10-28 RX ORDER — CEFUROXIME AXETIL 500 MG/1
500 TABLET ORAL
Qty: 14 | Refills: 2 | Status: COMPLETED | COMMUNITY
Start: 2019-09-26 | End: 2019-10-28

## 2019-11-01 NOTE — PROCEDURE
[Dysphagia] : dysphagia not clearly evaluated by indirect laryngoscopy [None] : none [Flexible Endoscope] : examined with the flexible endoscope [Serial Number: ___] : Serial Number: [unfilled] [de-identified] : No lesions in the NPx, OPx, HPx or larynx. VC are mobile, airway patent. No significant pooling of secretions, no obvious debris.

## 2019-11-01 NOTE — CONSULT LETTER
[Dear  ___] : Dear  [unfilled], [Courtesy Letter:] : I had the pleasure of seeing your patient, [unfilled], in my office today. [Please see my note below.] : Please see my note below. [Consult Closing:] : Thank you very much for allowing me to participate in the care of this patient.  If you have any questions, please do not hesitate to contact me. [Sincerely,] : Sincerely, [FreeTextEntry2] : Dr. Gustavo Padron\par [address], \[place] [address]  [FreeTextEntry3] : Dev

## 2019-11-01 NOTE — HISTORY OF PRESENT ILLNESS
[de-identified] : Referred by Dr. 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  Pt is experiencing dysphagia.  Pt was in and off of hospital since then and last admission was one month ago. pt is here to discuss further management. pt still cough when he ate and sometime bring back food that he ate 3 a few days ago.  He denies any signs of aspiration, dyspnea, otalgia or weight loss.

## 2019-12-02 ENCOUNTER — OUTPATIENT (OUTPATIENT)
Dept: OUTPATIENT SERVICES | Facility: HOSPITAL | Age: 79
LOS: 1 days | End: 2019-12-02

## 2019-12-02 VITALS
SYSTOLIC BLOOD PRESSURE: 130 MMHG | WEIGHT: 205.91 LBS | DIASTOLIC BLOOD PRESSURE: 80 MMHG | RESPIRATION RATE: 16 BRPM | TEMPERATURE: 98 F | HEIGHT: 69 IN | HEART RATE: 80 BPM

## 2019-12-02 DIAGNOSIS — G47.33 OBSTRUCTIVE SLEEP APNEA (ADULT) (PEDIATRIC): ICD-10-CM

## 2019-12-02 DIAGNOSIS — K22.5 DIVERTICULUM OF ESOPHAGUS, ACQUIRED: ICD-10-CM

## 2019-12-02 DIAGNOSIS — Q39.6 CONGENITAL DIVERTICULUM OF ESOPHAGUS: ICD-10-CM

## 2019-12-02 DIAGNOSIS — T78.40XA ALLERGY, UNSPECIFIED, INITIAL ENCOUNTER: ICD-10-CM

## 2019-12-02 DIAGNOSIS — R73.03 PREDIABETES: ICD-10-CM

## 2019-12-02 DIAGNOSIS — Z98.890 OTHER SPECIFIED POSTPROCEDURAL STATES: Chronic | ICD-10-CM

## 2019-12-02 DIAGNOSIS — Z90.79 ACQUIRED ABSENCE OF OTHER GENITAL ORGAN(S): Chronic | ICD-10-CM

## 2019-12-02 DIAGNOSIS — Z01.818 ENCOUNTER FOR OTHER PREPROCEDURAL EXAMINATION: ICD-10-CM

## 2019-12-02 LAB
ANION GAP SERPL CALC-SCNC: 14 MMO/L — SIGNIFICANT CHANGE UP (ref 7–14)
BLD GP AB SCN SERPL QL: NEGATIVE — SIGNIFICANT CHANGE UP
BUN SERPL-MCNC: 17 MG/DL — SIGNIFICANT CHANGE UP (ref 7–23)
CALCIUM SERPL-MCNC: 9.3 MG/DL — SIGNIFICANT CHANGE UP (ref 8.4–10.5)
CHLORIDE SERPL-SCNC: 101 MMOL/L — SIGNIFICANT CHANGE UP (ref 98–107)
CO2 SERPL-SCNC: 26 MMOL/L — SIGNIFICANT CHANGE UP (ref 22–31)
CREAT SERPL-MCNC: 1.03 MG/DL — SIGNIFICANT CHANGE UP (ref 0.5–1.3)
GLUCOSE SERPL-MCNC: 98 MG/DL — SIGNIFICANT CHANGE UP (ref 70–99)
HBA1C BLD-MCNC: 6.8 % — HIGH (ref 4–5.6)
HCT VFR BLD CALC: 43.9 % — SIGNIFICANT CHANGE UP (ref 39–50)
HGB BLD-MCNC: 13.5 G/DL — SIGNIFICANT CHANGE UP (ref 13–17)
MCHC RBC-ENTMCNC: 28.2 PG — SIGNIFICANT CHANGE UP (ref 27–34)
MCHC RBC-ENTMCNC: 30.8 % — LOW (ref 32–36)
MCV RBC AUTO: 91.6 FL — SIGNIFICANT CHANGE UP (ref 80–100)
NRBC # FLD: 0 K/UL — SIGNIFICANT CHANGE UP (ref 0–0)
PLATELET # BLD AUTO: 224 K/UL — SIGNIFICANT CHANGE UP (ref 150–400)
PMV BLD: 10 FL — SIGNIFICANT CHANGE UP (ref 7–13)
POTASSIUM SERPL-MCNC: 4.7 MMOL/L — SIGNIFICANT CHANGE UP (ref 3.5–5.3)
POTASSIUM SERPL-SCNC: 4.7 MMOL/L — SIGNIFICANT CHANGE UP (ref 3.5–5.3)
RBC # BLD: 4.79 M/UL — SIGNIFICANT CHANGE UP (ref 4.2–5.8)
RBC # FLD: 15.7 % — HIGH (ref 10.3–14.5)
RH IG SCN BLD-IMP: POSITIVE — SIGNIFICANT CHANGE UP
SODIUM SERPL-SCNC: 141 MMOL/L — SIGNIFICANT CHANGE UP (ref 135–145)
WBC # BLD: 8.47 K/UL — SIGNIFICANT CHANGE UP (ref 3.8–10.5)
WBC # FLD AUTO: 8.47 K/UL — SIGNIFICANT CHANGE UP (ref 3.8–10.5)

## 2019-12-02 PROCEDURE — 93010 ELECTROCARDIOGRAM REPORT: CPT

## 2019-12-02 RX ORDER — FLUTICASONE PROPIONATE 220 MCG
2 AEROSOL WITH ADAPTER (GRAM) INHALATION
Qty: 0 | Refills: 0 | DISCHARGE

## 2019-12-02 NOTE — H&P PST ADULT - NEGATIVE GENERAL GENITOURINARY SYMPTOMS
no hematuria/no renal colic/s/p Cystoscopy TURP/no dysuria no hematuria/no renal colic/s/p Prostatectomy/no dysuria

## 2019-12-02 NOTE — H&P PST ADULT - NEGATIVE GASTROINTESTINAL SYMPTOMS
no change in bowel habits/no abdominal pain/no nausea/no vomiting/no constipation/no diarrhea/no melena/no jaundice/no hiccoughs/no steatorrhea

## 2019-12-02 NOTE — H&P PST ADULT - NSICDXPROBLEM_GEN_ALL_CORE_FT
PROBLEM DIAGNOSES  Problem: Diverticulum of esophagus  Assessment and Plan: Direct Laryngoscopy Endoscopic Diverticulectomy, Possible Open Approach     Problem: Pre-diabetes  Assessment and Plan: PROBLEM DIAGNOSES  Problem: Diverticulum of esophagus  Assessment and Plan: Direct Laryngoscopy Endoscopic Diverticulectomy, Possible Open Approach   Pre op instructions reviewed with pt pt verbalized good understanding  of pre op instructions  Pt to Dr Dawn for pre op medical evaluation  ST/ECHO 2019 requested     Problem: Pre-diabetes  Assessment and Plan: BMP, HGBA1C done 12/02/19  FS dos     Problem: LOIS (obstructive sleep apnea)  Assessment and Plan: LOIS Precautions  OR booking notified via fax    Problem: Allergy  Assessment and Plan: Allergy ; albuterol  OR booking notified via fax

## 2019-12-02 NOTE — H&P PST ADULT - NSICDXPASTMEDICALHX_GEN_ALL_CORE_FT
PAST MEDICAL HISTORY:  Abnormal EKG     Chronic obstructive asthma denies recent hospitalizations or intubations    Creatinine elevation 7/19 ; with urinary retnetion    Eczema     Enlarged prostate with lower urinary tract symptoms (LUTS)     Pre-diabetes fs     Prostate CA x 20 years s/p Cystoscopy TURP 9/19    Shingles 03/2019    Thyroid nodule Hyperthyroidism s/p radioactive idione tx 1980 PAST MEDICAL HISTORY:  Abnormal EKG     Chronic obstructive asthma denies recent hospitalizations or intubations    Creatinine elevation 7/19 ; with urinary retention    Eczema     Enlarged prostate with lower urinary tract symptoms (LUTS)     Pre-diabetes fs     Prostate CA x 20 years s/p Cystoscopy TURP 9/19    Shingles 03/2019    Thyroid nodule Hyperthyroidism s/p radioactive idione tx 1980 PAST MEDICAL HISTORY:  Abnormal EKG     Chronic obstructive asthma denies recent hospitalizations or intubations    Creatinine elevation 7/19 ; with urinary retention    Eczema     Enlarged prostate with lower urinary tract symptoms (LUTS)     Pre-diabetes fs     Prostate CA S/P Open Suprapubic Prostatectomy    Shingles 03/2019    Thyroid nodule Hyperthyroidism s/p radioactive idione tx 1980

## 2019-12-02 NOTE — H&P PST ADULT - RS GEN PE MLT RESP DETAILS PC
clear to auscultation bilaterally/breath sounds equal/normal/good air movement/no wheezes/no chest wall tenderness/no intercostal retractions/respirations non-labored/airway patent/no rales

## 2019-12-02 NOTE — H&P PST ADULT - NEGATIVE MUSCULOSKELETAL SYMPTOMS
no muscle cramps/no back pain/no myalgia/no arm pain L/no arm pain R/no joint swelling/no muscle weakness

## 2019-12-02 NOTE — H&P PST ADULT - HISTORY OF PRESENT ILLNESS
Pt is a 78 y/o male with h/o  diverticulum of esophagus presents to PST . Pt reports h/o " food getting stuck in my esophagus" Pts/p Endoscopy . Pt now presents for direct Laryngoscopy , Endoscopic of Zenkers Diverticulectomy Possible Open Approach Pt is a 80 y/o male with h/o  diverticulum of esophagus presents to PST . Pt reports h/o " food getting stuck in my esophagus" Pts/p Endoscopy . Pt now presents for Direct Laryngoscopy , Endoscopic of Zenkers Diverticulectomy Possible Open Approach

## 2019-12-02 NOTE — H&P PST ADULT - ITE SK HX ROS MEA POS PC
dryness/rash/hair loss/eczema; bilateral hands right leg , bilateral feet Right > Left hair loss/eczema; back ; bilateral hands right leg , bilateral feet Right > Left/rash/dryness

## 2019-12-02 NOTE — H&P PST ADULT - GENERAL COMMENTS
30 lb weight loss since 9/19 " s/p Cystoscopy TURP, Diverticulum " 30 lb weight loss since 9/19 " s/p Prostatectomy ,2/2 Diverticulum "

## 2019-12-03 ENCOUNTER — TRANSCRIPTION ENCOUNTER (OUTPATIENT)
Age: 79
End: 2019-12-03

## 2019-12-03 NOTE — ASU PATIENT PROFILE, ADULT - PMH
Abnormal EKG    Chronic obstructive asthma  denies recent hospitalizations or intubations  Creatinine elevation  7/19 ; with urinary retention  Eczema    Enlarged prostate with lower urinary tract symptoms (LUTS)    Pre-diabetes  fs   Prostate CA  S/P Open Suprapubic Prostatectomy  Shingles  03/2019  Thyroid nodule  Hyperthyroidism s/p radioactive idione tx 1980

## 2019-12-04 ENCOUNTER — APPOINTMENT (OUTPATIENT)
Dept: OTOLARYNGOLOGY | Facility: HOSPITAL | Age: 79
End: 2019-12-04

## 2019-12-04 ENCOUNTER — INPATIENT (INPATIENT)
Facility: HOSPITAL | Age: 79
LOS: 0 days | Discharge: HOME | End: 2019-12-05
Attending: OTOLARYNGOLOGY | Admitting: OTOLARYNGOLOGY
Payer: MEDICARE

## 2019-12-04 VITALS
WEIGHT: 203.05 LBS | RESPIRATION RATE: 16 BRPM | HEIGHT: 70 IN | OXYGEN SATURATION: 96 % | HEART RATE: 83 BPM | TEMPERATURE: 97 F | SYSTOLIC BLOOD PRESSURE: 138 MMHG | DIASTOLIC BLOOD PRESSURE: 69 MMHG

## 2019-12-04 DIAGNOSIS — Z90.79 ACQUIRED ABSENCE OF OTHER GENITAL ORGAN(S): Chronic | ICD-10-CM

## 2019-12-04 DIAGNOSIS — K22.5 DIVERTICULUM OF ESOPHAGUS, ACQUIRED: ICD-10-CM

## 2019-12-04 LAB — GLUCOSE BLDC GLUCOMTR-MCNC: 107 MG/DL — HIGH (ref 70–99)

## 2019-12-04 PROCEDURE — 43180 ESOPHAGOSCOPY RIGID TRNSO: CPT | Mod: GC

## 2019-12-04 PROCEDURE — 71045 X-RAY EXAM CHEST 1 VIEW: CPT | Mod: 26

## 2019-12-04 RX ORDER — ACETAMINOPHEN 500 MG
650 TABLET ORAL EVERY 6 HOURS
Refills: 0 | Status: DISCONTINUED | OUTPATIENT
Start: 2019-12-04 | End: 2019-12-05

## 2019-12-04 RX ORDER — SODIUM CHLORIDE 9 MG/ML
1000 INJECTION, SOLUTION INTRAVENOUS
Refills: 0 | Status: DISCONTINUED | OUTPATIENT
Start: 2019-12-04 | End: 2019-12-04

## 2019-12-04 RX ORDER — MORPHINE SULFATE 50 MG/1
2 CAPSULE, EXTENDED RELEASE ORAL EVERY 4 HOURS
Refills: 0 | Status: DISCONTINUED | OUTPATIENT
Start: 2019-12-04 | End: 2019-12-05

## 2019-12-04 RX ORDER — SODIUM CHLORIDE 9 MG/ML
1000 INJECTION, SOLUTION INTRAVENOUS
Refills: 0 | Status: DISCONTINUED | OUTPATIENT
Start: 2019-12-04 | End: 2019-12-05

## 2019-12-04 RX ORDER — OXYCODONE HYDROCHLORIDE 5 MG/1
10 TABLET ORAL EVERY 6 HOURS
Refills: 0 | Status: DISCONTINUED | OUTPATIENT
Start: 2019-12-04 | End: 2019-12-05

## 2019-12-04 RX ORDER — OXYCODONE HYDROCHLORIDE 5 MG/1
5 TABLET ORAL EVERY 6 HOURS
Refills: 0 | Status: DISCONTINUED | OUTPATIENT
Start: 2019-12-04 | End: 2019-12-05

## 2019-12-04 RX ORDER — FLUTICASONE PROPIONATE 220 MCG
2 AEROSOL WITH ADAPTER (GRAM) INHALATION
Qty: 0 | Refills: 0 | DISCHARGE

## 2019-12-04 RX ORDER — PETROLATUM,WHITE
1 JELLY (GRAM) TOPICAL THREE TIMES A DAY
Refills: 0 | Status: DISCONTINUED | OUTPATIENT
Start: 2019-12-04 | End: 2019-12-05

## 2019-12-04 RX ADMIN — Medication 1 APPLICATION(S): at 22:52

## 2019-12-04 NOTE — PATIENT PROFILE ADULT - NSPRONUTRITIONRISK_GEN_A_NUR
Acute illness visit note    Chief Complaint   Patient presents with   • Sore Throat   • Cough       PCP: Latrell Michaels MD    HISTORY OF PRESENT ILLNESS: Joe Rivera is a 5 year old male who presents with Mom.  Joe is coming in today for a couple of concerns.  Primarily they would like to talk about a cold that started a couple weeks ago and then he started with a runny nose and cough a week later.  Yesterday mom noticed he had some redness in his throat and today he complained of a little sore throat.  He has been eating and drinking okay and his energy level has been good.  He is otherwise acting pretty normal.  They have not found any fevers.  He denies any vomiting or diarrhea.    She would also like to discuss snoring that started after the school year.  It is really loud.  He has had history of pauses with his breathing at night but non in the last several months.  He is a relatively restless sleeper.  She is worried that his tonsils may need to be removed.    The problem list was reviewed and updated as follows:  There are no active problems to display for this patient.      Current Outpatient Medications   Medication Sig Dispense Refill   • Multiple Vitamins-Minerals (MULTIVITAMIN PO) Take 1 tablet by mouth daily. gummy     • ACETAMINOPHEN CHILDRENS PO Take by mouth as needed.      • IBUPROFEN PO Take by mouth as needed.        No current facility-administered medications for this visit.        Allergies as of 12/04/2019 - Reviewed 12/04/2019   Allergen Reaction Noted   • Cefdinir HIVES 03/03/2016       Physical Exam  Vitals:  Visit Vitals  BP (!) 88/50   Pulse 80   Temp 98.3 °F (36.8 °C) (Temporal)   Resp 20   Ht 3' 8.75\" (1.137 m)   Wt 20.1 kg   BMI 15.60 kg/m²     General: Alert, interactive, no distress.  HEENT: Normocephalic, atraumatic. Conjunctivae and lids normal.  Pinnae position normal, Canals normal, Right TM (tympanic membrane) normal landmarks no fluid, Left TM (tympanic membrane)  normal landmarks no fluid.  Tubes patent present.  Nasal canals clear congestion. Oropharynx mild erythema, exudates present and tonsillar hypertrophy, touching at midline and deep  NECK: Supple with full range of motion, No significant adenopathy, No masses  LUNGS: Easy respiratory effort, Clear to auscultation bilaterally, no wheezes or crackles  HEART: Regular rate, normal S1 / S2 normal, No murmur, No clicks  ABDOMEN: Bowel sounds present.  No tenderness or distension, No masses or hepatosplenomegaly  Skin: No rashes    Strep PCR:  Negative    Assessment:   Viral URI with of mild viral pharyngitis on top of cold-like symptoms.  Doubt sinus infection at this point given his presentation.  Tonsillar hypertrophy    Plan:   · We discussed supportive care for his current illness and what I would expect as far as resolution of symptoms.  · I did place and ENT consultation and they have an established relationship with Dr. Gary to discuss possible tonsillectomy given the size and sleeping issues he is having    Vaccines recommended at today's visit: Up-to-date     Latrell Michaels MD         No indicators present

## 2019-12-05 ENCOUNTER — TRANSCRIPTION ENCOUNTER (OUTPATIENT)
Age: 79
End: 2019-12-05

## 2019-12-05 VITALS
SYSTOLIC BLOOD PRESSURE: 119 MMHG | RESPIRATION RATE: 18 BRPM | OXYGEN SATURATION: 97 % | TEMPERATURE: 99 F | DIASTOLIC BLOOD PRESSURE: 74 MMHG | HEART RATE: 80 BPM

## 2019-12-05 PROCEDURE — 74220 X-RAY XM ESOPHAGUS 1CNTRST: CPT | Mod: 26

## 2019-12-05 RX ORDER — IPRATROPIUM BROMIDE 0.2 MG/ML
1 SOLUTION, NON-ORAL INHALATION EVERY 6 HOURS
Refills: 0 | Status: DISCONTINUED | OUTPATIENT
Start: 2019-12-05 | End: 2019-12-05

## 2019-12-05 RX ORDER — FLUTICASONE PROPIONATE 220 MCG
2 AEROSOL WITH ADAPTER (GRAM) INHALATION
Refills: 0 | Status: DISCONTINUED | OUTPATIENT
Start: 2019-12-05 | End: 2019-12-05

## 2019-12-05 RX ORDER — ALBUTEROL 90 UG/1
1.25 AEROSOL, METERED ORAL EVERY 6 HOURS
Refills: 0 | Status: DISCONTINUED | OUTPATIENT
Start: 2019-12-05 | End: 2019-12-05

## 2019-12-05 RX ORDER — PETROLATUM,WHITE
1 JELLY (GRAM) TOPICAL
Qty: 1 | Refills: 0
Start: 2019-12-05 | End: 2019-12-09

## 2019-12-05 RX ORDER — ACETAMINOPHEN 500 MG
20.31 TABLET ORAL
Qty: 500 | Refills: 0
Start: 2019-12-05 | End: 2019-12-09

## 2019-12-05 RX ADMIN — Medication 2 PUFF(S): at 05:16

## 2019-12-05 RX ADMIN — Medication 1 APPLICATION(S): at 13:16

## 2019-12-05 RX ADMIN — Medication 1 APPLICATION(S): at 05:16

## 2019-12-05 NOTE — DISCHARGE NOTE PROVIDER - NSDCCPCAREPLAN_GEN_ALL_CORE_FT
PRINCIPAL DISCHARGE DIAGNOSIS  Diagnosis: S/P removal of Zenker's diverticulum  Assessment and Plan of Treatment:

## 2019-12-05 NOTE — DISCHARGE NOTE PROVIDER - HOSPITAL COURSE
S/P excision of zenkers diverticulum. Esophagram was done and negative for leak. Patient was started and discharged home on Full liquid diet. S/P excision of zenkers diverticulum. Esophagram was done and negative for leak. Patient was started and discharged home on Full liquid diet. Adv to soft diet on POD2

## 2019-12-05 NOTE — DISCHARGE NOTE PROVIDER - CARE PROVIDER_API CALL
Mariusz Mcelroy)  Otolaryngology  [address] [place] [address]  Phone: [phone]  Fax: [phone]  Follow Up Time: Haider Moya)  Otolaryngology  [address] [place] [address]  Phone: [phone]  Fax: [phone]  Follow Up Time:

## 2019-12-05 NOTE — PATIENT PROFILE ADULT - NSPROHMSYMPCOND_GEN_A_NUR
Fauzia for patient to keep Tylenol at bedside per Dr. Ponce.   Order faxed.   
John at [facility] is requesting that certain medication for pt can be placed at her bedside for her to take. They say pt is very particular about taking meds every four hours and staff cannot always get to her every four hours.  Staff informed  is off today.  
Spoke to Janet at [facility] per conversation.   Pt request to have access of tylenol 650 mg every 4 hours as needled for pain @ bedside.  Per staff pt is alert & oriented x4, to person, place, time, situation -   Denies of dementia and alzheimers disease.  Please fax order to [facility] if Dr. Ponce approves the request?    Informed Janet that Dr. Jones is out of the office today.  Per Janet its fine.    Request order fax to: [phone]  Attention: [facility]   
cancer/respiratory

## 2019-12-05 NOTE — DISCHARGE NOTE PROVIDER - NSDCFUADDINST_GEN_ALL_CORE_FT
Resume home medications  take percocet as needed for severe pain    Follow up with Dr. Moya as directed. call [phone] to confirm/schedule appointment or with any questions    If you develop worsening throat pain, fever, inability to swallow, or neck swelling, please seek medical attention Resume home medications    Follow up with Dr. Moya as directed. call [phone] to confirm/schedule appointment or with any questions    If you develop worsening throat pain, fever, inability to swallow, or neck swelling, please seek medical attention

## 2019-12-05 NOTE — PROGRESS NOTE ADULT - SUBJECTIVE AND OBJECTIVE BOX
No acute post surgical issues.  Post op Xray without subcutaneous emphysema or pneumothorax   Started on clear liquid diet   Some complaints of coughing.     Vital Signs Last 24 Hrs  T(C): 36.4 (05 Dec 2019 05:14), Max: 36.6 (04 Dec 2019 22:04)  T(F): 97.6 (05 Dec 2019 05:14), Max: 97.9 (04 Dec 2019 22:04)  HR: 93 (05 Dec 2019 05:14) (52 - 96)  BP: 128/77 (05 Dec 2019 05:14) (104/51 - 138/69)  BP(mean): 64 (04 Dec 2019 16:30) (64 - 81)  RR: 17 (05 Dec 2019 05:14) (11 - 20)  SpO2: 91% (05 Dec 2019 05:14) (91% - 97%)    Exam  NAD, comfortable  breathing comfortably on RA  OC/OP clear  Neck soft, flat, no swelling, no collections, no crepitus, SHARA    A/P  79 M hx of zenkers diverticulum now s/p repair of zenkers diverticulum    -f/u am labs  -esophogram today  -f/o esophogram   -possible advance diet after study  -continue clears  -pain control  -HOB elevation  -home medications  -oob ad sander, SCDs    dispo possible DC today pending esophogram and advancing of diet No acute post surgical issues.  Post op Xray without subcutaneous emphysema or pneumothorax   Started on clear liquid diet   Some complaints of coughing.     Vital Signs Last 24 Hrs  T(C): 36.4 (05 Dec 2019 05:14), Max: 36.6 (04 Dec 2019 22:04)  T(F): 97.6 (05 Dec 2019 05:14), Max: 97.9 (04 Dec 2019 22:04)  HR: 93 (05 Dec 2019 05:14) (52 - 96)  BP: 128/77 (05 Dec 2019 05:14) (104/51 - 138/69)  BP(mean): 64 (04 Dec 2019 16:30) (64 - 81)  RR: 17 (05 Dec 2019 05:14) (11 - 20)  SpO2: 91% (05 Dec 2019 05:14) (91% - 97%)    Exam  NAD, comfortable  breathing comfortably on RA  OC/OP clear  Neck soft, flat, no swelling, no collections, no crepitus, SHARA    A/P  79 M hx of zenkers diverticulum now s/p repair of zenkers diverticulum    -esophogram today  -f/o esophogram   -possible advance diet after study  -continue clears  -pain control  -HOB elevation  -home medications  -oob ad sander, SCDs    dispo possible DC today pending esophogram and advancing of diet

## 2019-12-05 NOTE — DISCHARGE NOTE PROVIDER - NSDCMRMEDTOKEN_GEN_ALL_CORE_FT
acetaminophen 160 mg/5 mL oral suspension: 20.31 milliliter(s) orally every 6 hours, As needed, Mild Pain (1 - 3)  clobetasol 0.05% topical spray: Apply topically to affected area 2 times a day  Flovent HFA 44 mcg/inh inhalation aerosol: 2 puff(s) inhaled 2 times a day, As Needed  Multiple Vitamins oral capsule: 1 cap(s) orally once a day; dc d 1 week ago   petrolatum topical ointment: 1 application topically 3 times a day

## 2019-12-05 NOTE — DISCHARGE NOTE PROVIDER - NSDCFUSCHEDAPPT_GEN_ALL_CORE_FT
ROSA COFFMAN ; 01/07/2020 ; NPP Derm [address] [facility]  ROSA COFFMAN ; 01/09/2020 ; NPP Urology [address] [place] [address]  ROSA COFFMAN ; 02/19/2020 ; NPP Urology [address] [place] [address] ROSA COFFMAN ; 01/07/2020 ; NPP Derm [address] [facility]  ROSA COFFMAN ; 01/09/2020 ; NPP Urology [address] [place] [address]  ROSA COFFMAN ; 02/19/2020 ; NPP Urology [address] [place] [address] ROSA COFFMAN ; 01/07/2020 ; NPP Derm [address] [facility]  ROSA COFFMAN ; 01/09/2020 ; NPP Urology [address] [place] [address]  ROSA COFFMAN ; 02/19/2020 ; NPP Urology [address] [place] [address]

## 2019-12-05 NOTE — DISCHARGE NOTE NURSING/CASE MANAGEMENT/SOCIAL WORK - PATIENT PORTAL LINK FT
You can access the FollowMyHealth Patient Portal offered by [facility] by registering at the following website: [email or web address]. By joining [employer]’s FollowMyHealth portal, you will also be able to view your health information using other applications (apps) compatible with our system.

## 2019-12-05 NOTE — DISCHARGE NOTE PROVIDER - INSTRUCTIONS
full liquid diet full liquid diet for 24 hours  may advance to soft diet on 12/6. Continue soft diet until you follow up with Dr. Moya

## 2019-12-05 NOTE — DISCHARGE NOTE PROVIDER - CARE PROVIDERS DIRECT ADDRESSES
[email or web address]

## 2019-12-06 ENCOUNTER — INBOUND DOCUMENT (OUTPATIENT)
Age: 79
End: 2019-12-06

## 2019-12-06 PROBLEM — E04.1 NONTOXIC SINGLE THYROID NODULE: Chronic | Status: ACTIVE | Noted: 2019-08-27

## 2019-12-06 PROBLEM — R73.03 PREDIABETES: Chronic | Status: ACTIVE | Noted: 2019-12-02

## 2019-12-06 PROBLEM — R79.89 OTHER SPECIFIED ABNORMAL FINDINGS OF BLOOD CHEMISTRY: Chronic | Status: ACTIVE | Noted: 2019-12-02

## 2019-12-06 PROBLEM — C61 MALIGNANT NEOPLASM OF PROSTATE: Chronic | Status: ACTIVE | Noted: 2019-04-22

## 2019-12-09 ENCOUNTER — APPOINTMENT (OUTPATIENT)
Dept: OTOLARYNGOLOGY | Facility: CLINIC | Age: 79
End: 2019-12-09
Payer: MEDICARE

## 2019-12-09 VITALS
HEART RATE: 72 BPM | SYSTOLIC BLOOD PRESSURE: 156 MMHG | DIASTOLIC BLOOD PRESSURE: 81 MMHG | BODY MASS INDEX: 30.86 KG/M2 | TEMPERATURE: 97.4 F | WEIGHT: 209 LBS

## 2019-12-09 PROCEDURE — 99024 POSTOP FOLLOW-UP VISIT: CPT

## 2019-12-09 PROCEDURE — 31575 DIAGNOSTIC LARYNGOSCOPY: CPT

## 2019-12-09 NOTE — CONSULT LETTER
[Dear  ___] : Dear  [unfilled], [Courtesy Letter:] : I had the pleasure of seeing your patient, [unfilled], in my office today. [Please see my note below.] : Please see my note below. [Consult Closing:] : Thank you very much for allowing me to participate in the care of this patient.  If you have any questions, please do not hesitate to contact me. [Sincerely,] : Sincerely, [FreeTextEntry2] : Dr. Gustavo Padron\par [address], \[place] [address]  [FreeTextEntry3] : Dev

## 2019-12-09 NOTE — PROCEDURE
[Dysphagia] : dysphagia not clearly evaluated by indirect laryngoscopy [None] : none [Flexible Endoscope] : examined with the flexible endoscope [Serial Number: ___] : Serial Number: [unfilled] [de-identified] : No lesions in the NPx, OPx, HPx or larynx. VC are mobile, airway patent. No significant pooling of secretions, no obvious debris.

## 2019-12-09 NOTE — REASON FOR VISIT
[Initial Evaluation] : an initial evaluation for [FreeTextEntry2] : Zenker's diverticulum- post op f/u

## 2019-12-09 NOTE — HISTORY OF PRESENT ILLNESS
[de-identified] : Referred by Dr. 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 pt is post direct DL  and endoscopic Zenker diverticulectomy on 12/4/2019. pt is feeling better, and no dysphagia. pt is able tolerate soft diet and turkey.   no more bring up food.  He denies any signs of aspiration, dyspnea, otalgia or weight loss.

## 2020-01-07 ENCOUNTER — APPOINTMENT (OUTPATIENT)
Dept: DERMATOLOGY | Facility: CLINIC | Age: 80
End: 2020-01-07
Payer: MEDICARE

## 2020-01-07 PROCEDURE — 99214 OFFICE O/P EST MOD 30 MIN: CPT

## 2020-01-07 RX ORDER — CLOBETASOL PROPIONATE 0.05 G/100ML
0.05 SHAMPOO TOPICAL DAILY
Qty: 1 | Refills: 3 | Status: ACTIVE | COMMUNITY
Start: 2020-01-07 | End: 1900-01-01

## 2020-01-07 NOTE — HISTORY OF PRESENT ILLNESS
[FreeTextEntry1] : eczema - still flaring. [de-identified] : Persistent, and marked pruritus over the past months.  Improved with clobetasol, but needs to use regularly, otherwise flares - trunk and all exts.  Also with itching in the scalp.

## 2020-01-07 NOTE — PHYSICAL EXAM
[Alert] : alert [Well Nourished] : well nourished [Oriented x 3] : ~L oriented x 3 [Full Body Skin Exam Performed] : performed [FreeTextEntry3] : Eczematous patches of the chest, back, and all exts.  Scalp clear.\par \par Scaling patch, right lateral sole.

## 2020-01-07 NOTE — ASSESSMENT
[FreeTextEntry1] : Atopic dermatitis / eczema\par Recommend systemic tx - has needed prednisone in past, topicals only marginally effective for him.\par Discussed NB-UVB txs vs. dupixent.  patient t/c options.\par Renew clobetasol ointment and will give clobetasol shampoo.\par Patient to call with tx preferences.\par \par HK of the sole.\par Use CeraVe SA cream.

## 2020-01-09 ENCOUNTER — APPOINTMENT (OUTPATIENT)
Dept: UROLOGY | Facility: CLINIC | Age: 80
End: 2020-01-09
Payer: MEDICARE

## 2020-01-09 PROCEDURE — 99214 OFFICE O/P EST MOD 30 MIN: CPT

## 2020-01-09 NOTE — PHYSICAL EXAM
[General Appearance - Well Developed] : well developed [General Appearance - Well Nourished] : well nourished [Normal Appearance] : normal appearance [Well Groomed] : well groomed [General Appearance - In No Acute Distress] : no acute distress [Abdomen Soft] : soft [Costovertebral Angle Tenderness] : no ~M costovertebral angle tenderness [Abdomen Tenderness] : non-tender [Urethral Meatus] : meatus normal [Urinary Bladder Findings] : the bladder was normal on palpation [No Prostate Nodules] : no prostate nodules [Scrotum] : the scrotum was normal [Testes Mass (___cm)] : there were no testicular masses [Edema] : no peripheral edema [] : no respiratory distress [Respiration, Rhythm And Depth] : normal respiratory rhythm and effort [Exaggerated Use Of Accessory Muscles For Inspiration] : no accessory muscle use [Oriented To Time, Place, And Person] : oriented to person, place, and time [Affect] : the affect was normal [Not Anxious] : not anxious [Mood] : the mood was normal [No Focal Deficits] : no focal deficits [Normal Station and Gait] : the gait and station were normal for the patient's age [No Palpable Adenopathy] : no palpable adenopathy

## 2020-01-10 LAB
ANION GAP SERPL CALC-SCNC: 13 MMOL/L
APPEARANCE: CLEAR
BACTERIA: NEGATIVE
BILIRUBIN URINE: NEGATIVE
BLOOD URINE: NEGATIVE
BUN SERPL-MCNC: 17 MG/DL
CALCIUM SERPL-MCNC: 9.3 MG/DL
CHLORIDE SERPL-SCNC: 98 MMOL/L
CO2 SERPL-SCNC: 29 MMOL/L
COLOR: YELLOW
CREAT SERPL-MCNC: 1.02 MG/DL
GLUCOSE QUALITATIVE U: NEGATIVE
GLUCOSE SERPL-MCNC: 89 MG/DL
HYALINE CASTS: 0 /LPF
KETONES URINE: NEGATIVE
LEUKOCYTE ESTERASE URINE: NEGATIVE
MICROSCOPIC-UA: NORMAL
NITRITE URINE: NEGATIVE
PH URINE: 6.5
POTASSIUM SERPL-SCNC: 4.9 MMOL/L
PROTEIN URINE: NORMAL
PSA FREE FLD-MCNC: 8 %
PSA FREE SERPL-MCNC: 5.12 NG/ML
PSA SERPL-MCNC: 60.7 NG/ML
RED BLOOD CELLS URINE: 1 /HPF
SODIUM SERPL-SCNC: 140 MMOL/L
SPECIFIC GRAVITY URINE: 1.02
SQUAMOUS EPITHELIAL CELLS: 1 /HPF
UROBILINOGEN URINE: NORMAL
WHITE BLOOD CELLS URINE: 1 /HPF

## 2020-03-09 ENCOUNTER — APPOINTMENT (OUTPATIENT)
Dept: OTOLARYNGOLOGY | Facility: CLINIC | Age: 80
End: 2020-03-09

## 2020-04-27 ENCOUNTER — APPOINTMENT (OUTPATIENT)
Dept: OTOLARYNGOLOGY | Facility: CLINIC | Age: 80
End: 2020-04-27

## 2020-04-27 ENCOUNTER — APPOINTMENT (OUTPATIENT)
Dept: OTOLARYNGOLOGY | Facility: CLINIC | Age: 80
End: 2020-04-27
Payer: MEDICARE

## 2020-04-27 DIAGNOSIS — K22.5 DIVERTICULUM OF ESOPHAGUS, ACQUIRED: ICD-10-CM

## 2020-04-27 PROCEDURE — 99441: CPT | Mod: 95

## 2020-05-02 ENCOUNTER — EMERGENCY (EMERGENCY)
Facility: HOSPITAL | Age: 80
LOS: 0 days | Discharge: HOME | End: 2020-05-02
Attending: EMERGENCY MEDICINE
Payer: MEDICARE

## 2020-05-02 VITALS
DIASTOLIC BLOOD PRESSURE: 71 MMHG | SYSTOLIC BLOOD PRESSURE: 142 MMHG | OXYGEN SATURATION: 97 % | TEMPERATURE: 98 F | RESPIRATION RATE: 12 BRPM | HEART RATE: 57 BPM

## 2020-05-02 VITALS — HEIGHT: 69 IN | WEIGHT: 216.93 LBS

## 2020-05-02 DIAGNOSIS — L30.9 DERMATITIS, UNSPECIFIED: ICD-10-CM

## 2020-05-02 DIAGNOSIS — R07.9 CHEST PAIN, UNSPECIFIED: ICD-10-CM

## 2020-05-02 DIAGNOSIS — B02.9 ZOSTER WITHOUT COMPLICATIONS: ICD-10-CM

## 2020-05-02 DIAGNOSIS — Z79.01 LONG TERM (CURRENT) USE OF ANTICOAGULANTS: ICD-10-CM

## 2020-05-02 DIAGNOSIS — J45.909 UNSPECIFIED ASTHMA, UNCOMPLICATED: ICD-10-CM

## 2020-05-02 DIAGNOSIS — Z90.79 ACQUIRED ABSENCE OF OTHER GENITAL ORGAN(S): Chronic | ICD-10-CM

## 2020-05-02 DIAGNOSIS — I27.82 CHRONIC PULMONARY EMBOLISM: ICD-10-CM

## 2020-05-02 DIAGNOSIS — Z88.8 ALLERGY STATUS TO OTHER DRUGS, MEDICAMENTS AND BIOLOGICAL SUBSTANCES STATUS: ICD-10-CM

## 2020-05-02 DIAGNOSIS — Z85.46 PERSONAL HISTORY OF MALIGNANT NEOPLASM OF PROSTATE: ICD-10-CM

## 2020-05-02 DIAGNOSIS — Z86.718 PERSONAL HISTORY OF OTHER VENOUS THROMBOSIS AND EMBOLISM: ICD-10-CM

## 2020-05-02 DIAGNOSIS — R06.02 SHORTNESS OF BREATH: ICD-10-CM

## 2020-05-02 DIAGNOSIS — R73.03 PREDIABETES: ICD-10-CM

## 2020-05-02 LAB
ALBUMIN SERPL ELPH-MCNC: 3.5 G/DL — SIGNIFICANT CHANGE UP (ref 3.3–5)
ALP SERPL-CCNC: 63 U/L — SIGNIFICANT CHANGE UP (ref 40–120)
ALT FLD-CCNC: 28 U/L — SIGNIFICANT CHANGE UP (ref 12–78)
ANION GAP SERPL CALC-SCNC: 3 MMOL/L — LOW (ref 5–17)
APTT BLD: 36.3 SEC — SIGNIFICANT CHANGE UP (ref 27.5–36.3)
AST SERPL-CCNC: 16 U/L — SIGNIFICANT CHANGE UP (ref 15–37)
BILIRUB SERPL-MCNC: 1.2 MG/DL — SIGNIFICANT CHANGE UP (ref 0.2–1.2)
BUN SERPL-MCNC: 19 MG/DL — SIGNIFICANT CHANGE UP (ref 7–23)
CALCIUM SERPL-MCNC: 9 MG/DL — SIGNIFICANT CHANGE UP (ref 8.5–10.1)
CHLORIDE SERPL-SCNC: 107 MMOL/L — SIGNIFICANT CHANGE UP (ref 96–108)
CO2 SERPL-SCNC: 32 MMOL/L — HIGH (ref 22–31)
CREAT SERPL-MCNC: 1.26 MG/DL — SIGNIFICANT CHANGE UP (ref 0.5–1.3)
GLUCOSE SERPL-MCNC: 120 MG/DL — HIGH (ref 70–99)
HCT VFR BLD CALC: 48.3 % — SIGNIFICANT CHANGE UP (ref 39–50)
HGB BLD-MCNC: 15.3 G/DL — SIGNIFICANT CHANGE UP (ref 13–17)
INR BLD: 1.06 RATIO — SIGNIFICANT CHANGE UP (ref 0.88–1.16)
MCHC RBC-ENTMCNC: 29.7 PG — SIGNIFICANT CHANGE UP (ref 27–34)
MCHC RBC-ENTMCNC: 31.7 GM/DL — LOW (ref 32–36)
MCV RBC AUTO: 93.6 FL — SIGNIFICANT CHANGE UP (ref 80–100)
PLATELET # BLD AUTO: 193 K/UL — SIGNIFICANT CHANGE UP (ref 150–400)
POTASSIUM SERPL-MCNC: 4.5 MMOL/L — SIGNIFICANT CHANGE UP (ref 3.5–5.3)
POTASSIUM SERPL-SCNC: 4.5 MMOL/L — SIGNIFICANT CHANGE UP (ref 3.5–5.3)
PROT SERPL-MCNC: 7.2 GM/DL — SIGNIFICANT CHANGE UP (ref 6–8.3)
PROTHROM AB SERPL-ACNC: 11.8 SEC — SIGNIFICANT CHANGE UP (ref 10–12.9)
RBC # BLD: 5.16 M/UL — SIGNIFICANT CHANGE UP (ref 4.2–5.8)
RBC # FLD: 14.5 % — SIGNIFICANT CHANGE UP (ref 10.3–14.5)
SODIUM SERPL-SCNC: 142 MMOL/L — SIGNIFICANT CHANGE UP (ref 135–145)
TROPONIN I SERPL-MCNC: <0.015 NG/ML — SIGNIFICANT CHANGE UP (ref 0.01–0.04)
TROPONIN I SERPL-MCNC: <0.015 NG/ML — SIGNIFICANT CHANGE UP (ref 0.01–0.04)
WBC # BLD: 6.41 K/UL — SIGNIFICANT CHANGE UP (ref 3.8–10.5)
WBC # FLD AUTO: 6.41 K/UL — SIGNIFICANT CHANGE UP (ref 3.8–10.5)

## 2020-05-02 PROCEDURE — 99284 EMERGENCY DEPT VISIT MOD MDM: CPT | Mod: 25

## 2020-05-02 PROCEDURE — 84484 ASSAY OF TROPONIN QUANT: CPT | Mod: 91

## 2020-05-02 PROCEDURE — 85610 PROTHROMBIN TIME: CPT

## 2020-05-02 PROCEDURE — 36415 COLL VENOUS BLD VENIPUNCTURE: CPT

## 2020-05-02 PROCEDURE — 71045 X-RAY EXAM CHEST 1 VIEW: CPT

## 2020-05-02 PROCEDURE — 71275 CT ANGIOGRAPHY CHEST: CPT | Mod: 26

## 2020-05-02 PROCEDURE — 85730 THROMBOPLASTIN TIME PARTIAL: CPT

## 2020-05-02 PROCEDURE — 99284 EMERGENCY DEPT VISIT MOD MDM: CPT

## 2020-05-02 PROCEDURE — 80053 COMPREHEN METABOLIC PANEL: CPT

## 2020-05-02 PROCEDURE — 71045 X-RAY EXAM CHEST 1 VIEW: CPT | Mod: 26

## 2020-05-02 PROCEDURE — 85027 COMPLETE CBC AUTOMATED: CPT

## 2020-05-02 PROCEDURE — 71275 CT ANGIOGRAPHY CHEST: CPT

## 2020-05-02 PROCEDURE — 93010 ELECTROCARDIOGRAM REPORT: CPT

## 2020-05-02 PROCEDURE — 93005 ELECTROCARDIOGRAM TRACING: CPT

## 2020-05-02 NOTE — ED ADULT NURSE NOTE - OBJECTIVE STATEMENT
78 y/o male with a PMHx of pre-DM, creatinine elevation, thyroid nodule, shingles, asthma, eczema, prostate CA s/p prostatectomy, recent RLE DVT on Xarelto, present ambulatory to the ED c/o chest pain that began last night. Pt reports chest pain began last night and resolved, then again this morning had right-sided chest pain and slight SOB, worse on exertion. Denies fever, chills, cough, nausea, vomiting. Pt has had a stress test within a year that was unremarkable.

## 2020-05-02 NOTE — ED PROVIDER NOTE - CARE PLAN
Principal Discharge DX:	Chest pain, unspecified type  Secondary Diagnosis:	Chronic pulmonary embolism, unspecified pulmonary embolism type, unspecified whether acute cor pulmonale present

## 2020-05-02 NOTE — ED PROVIDER NOTE - PROGRESS NOTE DETAILS
PE noted on CTA, but this appears to be chronic and likely coincides with onset of DVT.  Pt never hypoxic here and with normal HR throughout and is already appropriately dosed on xarelto with 3 months of script remaining.  No acute management indicated at this time for chronic PE.  Will require outpatient f/u for further management of anticoagulation going forward.  Trop x2 negative.  EKG non-ischemic and unchanged from prior.  Had reportedly negative stress test within the last year.  Is moderate risk HEART score and I discussed admission with the patient.  After discussing risks and benefits of admission patient declines and prefers outpatient f/u with cardiology.  Pt fully understands risks and is comfortable with these risks and will f/u cards. Provided with info for f/u with Dr. Ferrer.  No e/o ptx/pna/carditis.  Story not c/w dissection - below threshold for further w/u.  stable for d/c home at this time.  D/c home with strict return precautions and prompt outpatient f/u.

## 2020-05-02 NOTE — ED PROVIDER NOTE - MUSCULOSKELETAL, MLM
+2+ pitting edema RLE, RLE greater in circumference than left. NVID. Spine appears normal, range of motion is not limited.

## 2020-05-02 NOTE — ED PROVIDER NOTE - SECONDARY DIAGNOSIS.
Chronic pulmonary embolism, unspecified pulmonary embolism type, unspecified whether acute cor pulmonale present

## 2020-05-02 NOTE — ED PROVIDER NOTE - CLINICAL SUMMARY MEDICAL DECISION MAKING FREE TEXT BOX
Pt moderate risk heart score given story, also concerned for PE given recent diagnosis of DVT and new onset chest pain and SOB. Will start with CTA chest. Dispo pending labs and imaging.

## 2020-05-02 NOTE — ED PROVIDER NOTE - CARE PROVIDER_API CALL
Kasi Ferrer (MD)  Cardiovascular Disease  [address] [place] [address]  Phone: [phone]  Fax: [phone]  Follow Up Time: 1-3 Days

## 2020-05-02 NOTE — ED PROVIDER NOTE - OBJECTIVE STATEMENT
80 y/o male with a PMHx of pre-DM, creatinine elevation, thyroid nodule, shingles, asthma, eczema, prostate CA s/p prostatectomy, recent RLE DVT on Xarelto, present ambulatory to the ED c/o chest pain that began last night. Pt reports chest pain began last night and resolved, then again this morning had right-sided chest pain and slight SOB, worse on exertion. Denies fever, chills, cough, nausea, vomiting. Pt has had a stress test within a year that was unremarkable. No other complaints at this time. Allergies: Albuterol. PCP: Sarath Dawn.

## 2020-05-02 NOTE — ED PROVIDER NOTE - PATIENT PORTAL LINK FT
You can access the FollowMyHealth Patient Portal offered by [facility] by registering at the following website: [email or web address]. By joining [employer]’s FollowMyHealth portal, you will also be able to view your health information using other applications (apps) compatible with our system.

## 2020-05-07 ENCOUNTER — APPOINTMENT (OUTPATIENT)
Dept: CARDIOLOGY | Facility: CLINIC | Age: 80
End: 2020-05-07
Payer: MEDICARE

## 2020-05-07 ENCOUNTER — NON-APPOINTMENT (OUTPATIENT)
Age: 80
End: 2020-05-07

## 2020-05-07 ENCOUNTER — APPOINTMENT (OUTPATIENT)
Dept: UROLOGY | Facility: CLINIC | Age: 80
End: 2020-05-07

## 2020-05-07 VITALS
DIASTOLIC BLOOD PRESSURE: 74 MMHG | OXYGEN SATURATION: 95 % | WEIGHT: 223 LBS | HEART RATE: 80 BPM | HEIGHT: 69 IN | SYSTOLIC BLOOD PRESSURE: 116 MMHG | BODY MASS INDEX: 33.03 KG/M2

## 2020-05-07 DIAGNOSIS — I26.99 OTHER PULMONARY EMBOLISM W/OUT ACUTE COR PULMONALE: ICD-10-CM

## 2020-05-07 DIAGNOSIS — R07.89 OTHER CHEST PAIN: ICD-10-CM

## 2020-05-07 DIAGNOSIS — I82.431 ACUTE EMBOLISM AND THROMBOSIS OF RIGHT POPLITEAL VEIN: ICD-10-CM

## 2020-05-07 DIAGNOSIS — R94.31 ABNORMAL ELECTROCARDIOGRAM [ECG] [EKG]: ICD-10-CM

## 2020-05-07 PROCEDURE — 99214 OFFICE O/P EST MOD 30 MIN: CPT

## 2020-05-07 PROCEDURE — 93000 ELECTROCARDIOGRAM COMPLETE: CPT

## 2020-05-07 RX ORDER — RIVAROXABAN 20 MG/1
20 TABLET, FILM COATED ORAL
Qty: 90 | Refills: 3 | Status: ACTIVE | COMMUNITY

## 2020-05-07 NOTE — PHYSICAL EXAM
[Normal Conjunctiva] : the conjunctiva exhibited no abnormalities [General Appearance - Well Developed] : well developed [Normal Oral Mucosa] : normal oral mucosa [] : no respiratory distress [Respiration, Rhythm And Depth] : normal respiratory rhythm and effort [Auscultation Breath Sounds / Voice Sounds] : lungs were clear to auscultation bilaterally [Lungs Percussion] : the lungs were normal to percussion [Exaggerated Use Of Accessory Muscles For Inspiration] : no accessory muscle use [Chest Palpation] : palpation of the chest revealed no abnormalities [Heart Rate And Rhythm] : heart rate and rhythm were normal [Heart Sounds] : normal S1 and S2 [Abnormal Walk] : normal gait [Abdomen Tenderness] : non-tender [Bowel Sounds] : normal bowel sounds [Nail Clubbing] : no clubbing of the fingernails [Skin Color & Pigmentation] : normal skin color and pigmentation [Trunk] : on the trunk [FreeTextEntry1] : eczema [Oriented To Time, Place, And Person] : oriented to person, place, and time

## 2020-05-07 NOTE — REVIEW OF SYSTEMS
[Fever] : no fever [Earache] : no earache [Blurry Vision] : no blurred vision [Chest Pain] : no chest pain [Shortness Of Breath] : no shortness of breath [Palpitations] : no palpitations [Abdominal Pain] : no abdominal pain [Cough] : no cough [Dysphagia] : no dysphagia [Impotence] : no impotence [Urinary Frequency] : no change in urinary frequency [Joint Pain] : no joint pain [Skin: A Rash] : no rash: [Dizziness] : no dizziness [Skin Lesions] : no skin lesions [Easy Bleeding] : no tendency for easy bleeding [Excessive Thirst] : no polydipsia [Confusion] : no confusion was observed [Negative] : Heme/Lymph

## 2020-05-07 NOTE — HISTORY OF PRESENT ILLNESS
[FreeTextEntry1] : 79 year old male without significant cardiac history , chronic  eczema ,s/p zenkars diverticular surgery , abnormal EKG RBBB ,who came for follow up after  hospital visit  with chest pain and shortness of breath ,   as per patient who had recently diagnosed right LE swelling diagnosed to have acute RIght LE DVT. patient developd mild pressure like chest discomfort right side which was migrating , middle of chest , with mild shortness of breath on activity , went to ER where he had CT angio chest showed PE , patient was hemodynamically stable at that time negative troponin , patient was sent home since then he is doing well  was on anticoagulation ,  patient  currently feeling much better \par \par Patient denies any exertional chest pain or shortness of breath , \par

## 2020-05-07 NOTE — REASON FOR VISIT
[Abnormal ECG] : an abnormal ECG [Follow-Up - From Hospitalization] : follow-up of a recent hospitalization for [Chest Pain] : chest pain [Medication Management] : Medication management [FreeTextEntry1] : acute PE  DVT

## 2020-05-07 NOTE — ASSESSMENT
[FreeTextEntry1] : 79 year old male with above hx who had acute DVT hemodynamically stable PE  last week who is having atypical chest pain which may be due to acute PE which is resolved , negative troponin , \par \par Mild hypertension   resolved now , would recommend low salt diet  , home BP monitoring .\par \par Intermittent RBBB occurred once , without active symptoms in the past\par \par would recommend echocardiogram to asses ventricular function , will

## 2020-05-13 ENCOUNTER — APPOINTMENT (OUTPATIENT)
Dept: DERMATOLOGY | Facility: CLINIC | Age: 80
End: 2020-05-13
Payer: MEDICARE

## 2020-05-13 PROCEDURE — 99213 OFFICE O/P EST LOW 20 MIN: CPT

## 2020-05-13 NOTE — ASSESSMENT
[FreeTextEntry1] : Stasis dermatitis with exacerbation R leg;\par \par Therapeutic options and their risks and benefits; along with multiple diagnostic possibilities were discussed at length;\par \par No signs infection; may use clobetasol HS, up to one week (already has Rx)\par resume Jobst stockings daily; \par education;  \par f/u prn if fails to improve, although may have some persistent stasis changes;\par

## 2020-05-13 NOTE — PHYSICAL EXAM
[FreeTextEntry3] : Skin examination performed of the face, neck, chest, hands, lower legs;\par The patient is well, alert and oriented, pleasant and cooperative.\par Eyelids, conjunctivae, oral mucosa, digits and nails all normal.  \par No cervical adenopathy.\par \par \par Erythematous scaling patches with inflammation and induration;  R medial lower leg, with varicosities;  \par similar, less extensive changes on Left

## 2020-05-13 NOTE — HISTORY OF PRESENT ILLNESS
[de-identified] : The patient has been fit in for urgent appointment. \par c/o itching, redness of R leg;  being treated for cellulitis by PMD over last month;  finished antibiotics;\par has Hx eczema, also varicose veins;  uses support socks

## 2020-05-20 ENCOUNTER — APPOINTMENT (OUTPATIENT)
Dept: CARDIOLOGY | Facility: CLINIC | Age: 80
End: 2020-05-20
Payer: MEDICARE

## 2020-05-20 PROCEDURE — 93306 TTE W/DOPPLER COMPLETE: CPT

## 2020-07-26 PROBLEM — K22.5 ZENKER'S DIVERTICULUM: Status: ACTIVE | Noted: 2019-03-25

## 2020-07-26 NOTE — HISTORY OF PRESENT ILLNESS
[Home] : at home, [unfilled] , at the time of the visit. [Medical Office: ([facility])___] : at the medical office located in  [Verbal consent obtained from patient] : the patient, [unfilled] [de-identified] : Referred by Dr. 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 pt is post direct DL and endoscopic Zenker diverticulectomy on 12/4/2019. pt is feeling better, and no dysphagia. pt is able tolerate a regular diet without any symptoms.  He denies any dyspnea, otalgia, weight loss.\par

## 2020-08-10 ENCOUNTER — APPOINTMENT (OUTPATIENT)
Dept: OTOLARYNGOLOGY | Facility: CLINIC | Age: 80
End: 2020-08-10

## 2020-09-27 NOTE — PROGRESS NOTE ADULT - SUBJECTIVE AND OBJECTIVE BOX
HPI  79 yo male presents with fever, difficulty swallowing which causes him to cough. Admitted last week to  for cellulitis of face secondary to eczema. Pt was on vanc and unasyn. and improved. He later completed bactrim course. Pt saw his Dermatologist who told pt he had Shingles. Patient was febrile in the ED. Patient reports progressive dysphagia since discharge.      SUBJECTIVE:   3/18/19    Pt seen and evaluated at bedside today. Denied any complaints when seen. No fevers or chills. Spoke with ENT Dr. Boone who continued to recommend barium swallow for this pt for further evaluation. Plan for modified barium swallow today discussed with patient this AM. Pt later signed out AMA.       REVIEW OF SYSTEMS:    CONSTITUTIONAL: No weakness, no fevers , no chills  EYES/ENT: No visual changes;  No vertigo, difficulty swallowing  NECK: No pain or stiffness  RESPIRATORY: No cough, wheezing, hemoptysis; No shortness of breath  CARDIOVASCULAR: No chest pain or palpitations  GASTROINTESTINAL: No abdominal or epigastric pain. No nausea, vomiting, or hematemesis; No diarrhea or constipation. No melena or hematochezia.  GENITOURINARY: No dysuria, frequency or hematuria  NEUROLOGICAL: No numbness or weakness  All other review of systems is negative unless indicated above    Vital Signs Last 24 Hrs  T(C): 36.7 (18 Mar 2019 11:52), Max: 36.8 (18 Mar 2019 05:16)  T(F): 98.1 (18 Mar 2019 11:52), Max: 98.3 (18 Mar 2019 05:16)  HR: 79 (18 Mar 2019 11:52) (64 - 79)  BP: 143/78 (18 Mar 2019 11:52) (125/57 - 143/78)  BP(mean): --  RR: 18 (18 Mar 2019 05:16) (18 - 18)  SpO2: 95% (18 Mar 2019 11:52) (95% - 95%)    I&O's Summary        PHYSICAL EXAM:    Constitutional: NAD, awake and alert  HEENT: PERR, EOMI,  Neck: Soft and supple, No LAD  Respiratory: Breath sounds are clear bilaterally, No wheezing, rales or rhonchi  Cardiovascular: S1 and S2, regular rate and rhythm, no Murmurs, gallops or rubs  Gastrointestinal: Bowel Sounds present, soft, nontender, nondistended, no guarding, no rebound  Extremities: No peripheral edema  Vascular: 2+ peripheral pulses  Neurological: A/O x 3  Musculoskeletal: 5/5 strength b/l upper and lower extremities  Skin: multiple macular erythematous back lesions, not crusted, non painful. Similar lesions on b/l arms. Multiple L facial crusted lesions noted.      MEDICATIONS  (STANDING):  AQUAPHOR (petrolatum Ointment) 1 Application(s) Topical three times a day  dextrose 5% + sodium chloride 0.45%. 1000 milliLiter(s) (75 mL/Hr) IV Continuous <Continuous>  dextrose 5%. 1000 milliLiter(s) (50 mL/Hr) IV Continuous <Continuous>  dextrose 50% Injectable 12.5 Gram(s) IV Push once  dextrose 50% Injectable 25 Gram(s) IV Push once  dextrose 50% Injectable 25 Gram(s) IV Push once  fluconAZOLE   Tablet 100 milliGRAM(s) Oral daily  heparin  Injectable 5000 Unit(s) SubCutaneous every 8 hours  insulin lispro (HumaLOG) corrective regimen sliding scale   SubCutaneous three times a day before meals  insulin lispro (HumaLOG) corrective regimen sliding scale   SubCutaneous at bedtime  nystatin    Suspension 507394 Unit(s) Oral every 6 hours    MEDICATIONS  (PRN):  acetaminophen   Tablet .. 650 milliGRAM(s) Oral every 6 hours PRN Temp greater or equal to 38C (100.4F), Mild Pain (1 - 3)  dextrose 40% Gel 15 Gram(s) Oral once PRN Blood Glucose LESS THAN 70 milliGRAM(s)/deciliter  glucagon  Injectable 1 milliGRAM(s) IntraMuscular once PRN Glucose LESS THAN 70 milligrams/deciliter  hydrocortisone 0.5% Cream 1 Application(s) Topical every 6 hours PRN Rash and/or Itching      Blood Culture:     RADIOLOGY/EKG:    EXAM:  XR CHEST PORTABLE URGENT 1V                            PROCEDURE DATE:  03/14/2019          INTERPRETATION:  Chest portable.    Clinical History: Fever    Comparison: none    Single AP view submitted.    The evaluation of the cardiomediastinal silhouette is limited on portable   technique.    There is subsegmental atelectasis versus pneumonia in the left lung base.    Impression:    Subsegmental atelectasis versus pneumonia in the left lung base.        EXAM:  XR ESOPHAGUS                            PROCEDURE DATE:  03/15/2019          INTERPRETATION:  Clinical information: Dysphagia    TECHNIQUE: An esophagram was performed utilizing barium and effervescent   granules to distend the esophagus.    COMPARISON: None    FINDINGS: Preliminary radiograph of the chest demonstrates mild patchy   opacity at both lung bases and an azygos fissure.    A moderate-sized Zenker's diverticulum is identified measuring 3.3 cm in   maximal dimension. There was persistent residual contrast within the   diverticulum and after several swallows, matt tracheal aspiration was   noted. The examination was then terminated. Evaluation of the remainder   of the esophagus is limited due to underdistention, however nogross   abnormality is identified.    IMPRESSION:    Matt tracheal aspiration likely due to reflux of contrast from a   moderate-sized Zenker's diverticulum.    Findings discussed with Dr. TON Mcleod on March 15, 2019 3:15 PM.        DVT PPX: Hep SQ Q8h    ADVANCED DIRECTIVE:    DISPOSITION: Breech presentation, single or unspecified fetus

## 2020-10-22 ENCOUNTER — APPOINTMENT (OUTPATIENT)
Dept: UROLOGY | Facility: CLINIC | Age: 80
End: 2020-10-22
Payer: MEDICARE

## 2020-10-22 VITALS — TEMPERATURE: 97.6 F

## 2020-10-22 DIAGNOSIS — R97.20 ELEVATED PROSTATE, SPECIFIC ANTIGEN [PSA]: ICD-10-CM

## 2020-10-22 PROCEDURE — 99214 OFFICE O/P EST MOD 30 MIN: CPT

## 2020-10-23 LAB
ANION GAP SERPL CALC-SCNC: 14 MMOL/L
BUN SERPL-MCNC: 18 MG/DL
CALCIUM SERPL-MCNC: 9.1 MG/DL
CHLORIDE SERPL-SCNC: 103 MMOL/L
CO2 SERPL-SCNC: 27 MMOL/L
CREAT SERPL-MCNC: 1.16 MG/DL
GLUCOSE SERPL-MCNC: 169 MG/DL
POTASSIUM SERPL-SCNC: 4.2 MMOL/L
PSA FREE FLD-MCNC: 7 %
PSA FREE SERPL-MCNC: 6.6 NG/ML
PSA SERPL-MCNC: 88.8 NG/ML
SODIUM SERPL-SCNC: 144 MMOL/L

## 2020-11-03 NOTE — SBIRT NOTE ADULT - NSSBIRTDRGACTION/INTER_GEN_A_CORE
Patient called again. I let her know that I passed on the letter to Kelly and she will look over it when she has time and decide what to do. Patient stated understanding.    ----- Message from Susan Montelongo sent at 11/3/2020  1:59 PM CST -----  Contact: Madhurislade Mccainte would like a call back at [phone], Regards to she did not wish to say why other than the nurse will know once they see  her name.    Thanks  Td       None

## 2021-01-28 ENCOUNTER — APPOINTMENT (OUTPATIENT)
Dept: UROLOGY | Facility: CLINIC | Age: 81
End: 2021-01-28
Payer: MEDICARE

## 2021-01-28 ENCOUNTER — OUTPATIENT (OUTPATIENT)
Dept: OUTPATIENT SERVICES | Facility: HOSPITAL | Age: 81
LOS: 1 days | End: 2021-01-28
Payer: MEDICARE

## 2021-01-28 DIAGNOSIS — Z90.79 ACQUIRED ABSENCE OF OTHER GENITAL ORGAN(S): Chronic | ICD-10-CM

## 2021-01-28 DIAGNOSIS — R35.0 FREQUENCY OF MICTURITION: ICD-10-CM

## 2021-01-28 PROCEDURE — 99214 OFFICE O/P EST MOD 30 MIN: CPT

## 2021-01-28 PROCEDURE — 96402 CHEMO HORMON ANTINEOPL SQ/IM: CPT

## 2021-01-28 RX ORDER — LEUPROLIDE ACETATE 30 MG/.5ML
30 INJECTION, SUSPENSION, EXTENDED RELEASE SUBCUTANEOUS
Refills: 0 | Status: COMPLETED | OUTPATIENT
Start: 2021-01-28

## 2021-01-28 RX ORDER — LEUPROLIDE ACETATE 30 MG/.5ML
30 INJECTION, SUSPENSION, EXTENDED RELEASE SUBCUTANEOUS DAILY
Qty: 1 | Refills: 0 | Status: COMPLETED | OUTPATIENT
Start: 2021-01-28 | End: 2021-01-28

## 2021-01-28 RX ADMIN — LEUPROLIDE ACETATE 0 MG: KIT SUBCUTANEOUS at 00:00

## 2021-02-01 DIAGNOSIS — C61 MALIGNANT NEOPLASM OF PROSTATE: ICD-10-CM

## 2021-03-02 NOTE — H&P PST ADULT - PULMONARY EMBOLUS
Patient arrives for evaluation of abdominal pain, back pain, and right leg pain for 1 week. Denies injury.    no

## 2021-03-15 NOTE — ED PROVIDER NOTE - CONSTITUTIONAL, MLM
Stable labs normal... Well appearing, awake, alert, oriented to person, place, time/situation and in no apparent distress.

## 2021-03-23 ENCOUNTER — EMERGENCY (EMERGENCY)
Facility: HOSPITAL | Age: 81
LOS: 0 days | Discharge: HOME | End: 2021-03-23
Attending: EMERGENCY MEDICINE
Payer: MEDICARE

## 2021-03-23 VITALS
RESPIRATION RATE: 18 BRPM | HEIGHT: 69 IN | HEART RATE: 86 BPM | SYSTOLIC BLOOD PRESSURE: 133 MMHG | OXYGEN SATURATION: 95 % | WEIGHT: 229.94 LBS | TEMPERATURE: 97 F | DIASTOLIC BLOOD PRESSURE: 85 MMHG

## 2021-03-23 VITALS
DIASTOLIC BLOOD PRESSURE: 78 MMHG | RESPIRATION RATE: 18 BRPM | SYSTOLIC BLOOD PRESSURE: 146 MMHG | OXYGEN SATURATION: 97 % | HEART RATE: 85 BPM | TEMPERATURE: 98 F
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
## 2021-03-23 DIAGNOSIS — S31.133A PUNCTURE WOUND OF ABDOMINAL WALL WITHOUT FOREIGN BODY, RIGHT LOWER QUADRANT WITHOUT PENETRATION INTO PERITONEAL CAVITY, INITIAL ENCOUNTER: ICD-10-CM

## 2021-03-23 DIAGNOSIS — J45.909 UNSPECIFIED ASTHMA, UNCOMPLICATED: ICD-10-CM

## 2021-03-23 DIAGNOSIS — Z86.718 PERSONAL HISTORY OF OTHER VENOUS THROMBOSIS AND EMBOLISM: ICD-10-CM

## 2021-03-23 DIAGNOSIS — Z90.79 ACQUIRED ABSENCE OF OTHER GENITAL ORGAN(S): Chronic | ICD-10-CM

## 2021-03-23 DIAGNOSIS — R73.03 PREDIABETES: ICD-10-CM

## 2021-03-23 DIAGNOSIS — L30.9 DERMATITIS, UNSPECIFIED: ICD-10-CM
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
## 2021-03-23 DIAGNOSIS — Z79.01 LONG TERM (CURRENT) USE OF ANTICOAGULANTS: ICD-10-CM

## 2021-03-23 DIAGNOSIS — E05.90 THYROTOXICOSIS, UNSPECIFIED WITHOUT THYROTOXIC CRISIS OR STORM: ICD-10-CM

## 2021-03-23 DIAGNOSIS — N40.1 BENIGN PROSTATIC HYPERPLASIA WITH LOWER URINARY TRACT SYMPTOMS: ICD-10-CM

## 2021-03-23 DIAGNOSIS — Z85.46 PERSONAL HISTORY OF MALIGNANT NEOPLASM OF PROSTATE: ICD-10-CM

## 2021-03-23 LAB
ANION GAP SERPL CALC-SCNC: 3 MMOL/L — LOW (ref 5–17)
APTT BLD: 43.7 SEC — HIGH (ref 27.5–35.5)
BASOPHILS # BLD AUTO: 0.03 K/UL — SIGNIFICANT CHANGE UP (ref 0–0.2)
BASOPHILS NFR BLD AUTO: 0.5 % — SIGNIFICANT CHANGE UP (ref 0–2)
BUN SERPL-MCNC: 20 MG/DL — SIGNIFICANT CHANGE UP (ref 7–23)
CALCIUM SERPL-MCNC: 9 MG/DL — SIGNIFICANT CHANGE UP (ref 8.5–10.1)
CHLORIDE SERPL-SCNC: 109 MMOL/L — HIGH (ref 96–108)
CO2 SERPL-SCNC: 30 MMOL/L — SIGNIFICANT CHANGE UP (ref 22–31)
CREAT SERPL-MCNC: 1.2 MG/DL — SIGNIFICANT CHANGE UP (ref 0.5–1.3)
EOSINOPHIL # BLD AUTO: 0.51 K/UL — HIGH (ref 0–0.5)
EOSINOPHIL NFR BLD AUTO: 8.1 % — HIGH (ref 0–6)
GLUCOSE SERPL-MCNC: 149 MG/DL — HIGH (ref 70–99)
HCT VFR BLD CALC: 42.6 % — SIGNIFICANT CHANGE UP (ref 39–50)
HGB BLD-MCNC: 14 G/DL — SIGNIFICANT CHANGE UP (ref 13–17)
IMM GRANULOCYTES NFR BLD AUTO: 0.2 % — SIGNIFICANT CHANGE UP (ref 0–1.5)
INR BLD: 1.58 RATIO — HIGH (ref 0.88–1.16)
LYMPHOCYTES # BLD AUTO: 1 K/UL — SIGNIFICANT CHANGE UP (ref 1–3.3)
LYMPHOCYTES # BLD AUTO: 15.8 % — SIGNIFICANT CHANGE UP (ref 13–44)
MCHC RBC-ENTMCNC: 31.8 PG — SIGNIFICANT CHANGE UP (ref 27–34)
MCHC RBC-ENTMCNC: 32.9 GM/DL — SIGNIFICANT CHANGE UP (ref 32–36)
MCV RBC AUTO: 96.8 FL — SIGNIFICANT CHANGE UP (ref 80–100)
MONOCYTES # BLD AUTO: 0.53 K/UL — SIGNIFICANT CHANGE UP (ref 0–0.9)
MONOCYTES NFR BLD AUTO: 8.4 % — SIGNIFICANT CHANGE UP (ref 2–14)
NEUTROPHILS # BLD AUTO: 4.25 K/UL — SIGNIFICANT CHANGE UP (ref 1.8–7.4)
NEUTROPHILS NFR BLD AUTO: 67 % — SIGNIFICANT CHANGE UP (ref 43–77)
PLATELET # BLD AUTO: 164 K/UL — SIGNIFICANT CHANGE UP (ref 150–400)
POTASSIUM SERPL-MCNC: 4.4 MMOL/L — SIGNIFICANT CHANGE UP (ref 3.5–5.3)
POTASSIUM SERPL-SCNC: 4.4 MMOL/L — SIGNIFICANT CHANGE UP (ref 3.5–5.3)
PROTHROM AB SERPL-ACNC: 18.1 SEC — HIGH (ref 10.6–13.6)
RBC # BLD: 4.4 M/UL — SIGNIFICANT CHANGE UP (ref 4.2–5.8)
RBC # FLD: 13.4 % — SIGNIFICANT CHANGE UP (ref 10.3–14.5)
SODIUM SERPL-SCNC: 142 MMOL/L — SIGNIFICANT CHANGE UP (ref 135–145)
WBC # BLD: 6.33 K/UL — SIGNIFICANT CHANGE UP (ref 3.8–10.5)
WBC # FLD AUTO: 6.33 K/UL — SIGNIFICANT CHANGE UP (ref 3.8–10.5)

## 2021-03-23 PROCEDURE — 36415 COLL VENOUS BLD VENIPUNCTURE: CPT

## 2021-03-23 PROCEDURE — 80048 BASIC METABOLIC PNL TOTAL CA: CPT

## 2021-03-23 PROCEDURE — 85025 COMPLETE CBC W/AUTO DIFF WBC: CPT

## 2021-03-23 PROCEDURE — 85730 THROMBOPLASTIN TIME PARTIAL: CPT

## 2021-03-23 PROCEDURE — 74177 CT ABD & PELVIS W/CONTRAST: CPT | Mod: 26

## 2021-03-23 PROCEDURE — 85610 PROTHROMBIN TIME: CPT

## 2021-03-23 PROCEDURE — 12001 RPR S/N/AX/GEN/TRNK 2.5CM/<: CPT

## 2021-03-23 PROCEDURE — 12001 RPR S/N/AX/GEN/TRNK 2.5CM/<: CPT | Mod: GC

## 2021-03-23 PROCEDURE — 99284 EMERGENCY DEPT VISIT MOD MDM: CPT | Mod: 25

## 2021-03-23 PROCEDURE — 74177 CT ABD & PELVIS W/CONTRAST: CPT

## 2021-03-23 RX ORDER — CEPHALEXIN 500 MG
500 CAPSULE ORAL ONCE
Refills: 0 | Status: COMPLETED | OUTPATIENT
Start: 2021-03-23 | End: 2021-03-23

## 2021-03-23 RX ORDER — CEPHALEXIN 500 MG
1 CAPSULE ORAL
Qty: 14 | Refills: 0
Start: 2021-03-23 | End: 2021-03-29

## 2021-03-23 RX ADMIN — Medication 500 MILLIGRAM(S): at 11:54

## 2021-03-23 NOTE — ED ADULT TRIAGE NOTE - CHIEF COMPLAINT QUOTE
pt. states he was punctured with glass to abdomen on Xarelto, triage RN assessed wound, no active bleeding, pt. abdomen is soft and non-tender, umbilicus is at baseline as per pt. ,states blood was "squirting". code trauma to be called, ED MD to bedside for eval, ED MD Jesus Alberto states no code trauma needed and no trauma alert, ED MD inserted q-tip for depth of wound and states wound is 1.5cm, wound began to ooze blood. ED ANM and ED RN director at bedside and aware of POC. pt. denies dizziness. Pt. color is pink, VSS

## 2021-03-23 NOTE — ED PROVIDER NOTE - PATIENT PORTAL LINK FT
You can access the FollowMyHealth Patient Portal offered by [facility] by registering at the following website: [email or web address]. By joining [email or web address]’s FollowMyHealth portal, you will also be able to view your health information using other applications (apps) compatible with our system.

## 2021-03-23 NOTE — ED PROVIDER NOTE - PHYSICAL EXAMINATION
*GEN: No acute distress, well appearing   *HEAD: Normocephalic, Atraumatic  *EYES/NOSE: b/l Pupils symmetric & Reactive to ligth, EOMI b/l  *THROAT: airway patent, moist mucous membranes  *NECK: Neck supple  *PULMONARY: No Respiratory distress, symmetric b/l chest rise  *CARDIAC: s1s2, regular rhythm   *ABDOMEN:  Non Tender, Non Distended, soft, no guarding, no rebound. Reducible umbilical hernia. 1cm superficial laceration. Placed tip and wound is 1.5 cm deep.   *BACK: no CVA tenderness, No midline vertebral tenderness to palpation   *EXTREMITIES: symmetric pulses, 2+ DP & radial pulses, no cyanosis, no edema   *SKIN: no rash, no bruising   *NEUROLOGIC: alert,  full active & passive ROM in all 4 extremities,   *PSYCH: appropriate concern about symptoms, pleasant *GEN: No acute distress, well appearing   *HEAD: Normocephalic, Atraumatic  *EYES/NOSE: b/l Pupils symmetric & Reactive to ligth, EOMI b/l  *THROAT: airway patent, moist mucous membranes  *NECK: Neck supple  *PULMONARY: No Respiratory distress, symmetric b/l chest rise  *CARDIAC: s1s2, regular rhythm   *ABDOMEN:  Non Tender, Non Distended, soft, no guarding, no rebound. Reducible umbilical hernia. 1cm superficial laceration. Placed qtip and wound is 1.5 cm deep.   *BACK: no CVA tenderness, No midline vertebral tenderness to palpation   *EXTREMITIES: symmetric pulses, 2+ DP & radial pulses, no cyanosis, no edema   *SKIN: no rash, no bruising   *NEUROLOGIC: alert,  full active & passive ROM in all 4 extremities,   *PSYCH: appropriate concern about symptoms, pleasant

## 2021-03-23 NOTE — ED ADULT NURSE NOTE - OBJECTIVE STATEMENT
Patient prements to ED s/p puncture wound to RLQ. Pt was taking out a garbage bag that had glass in hit and the bag hit his abd causing 1cm superficial laceration that is about 1.5 cm deep. Pt is not having any abd pain expect at site of puncture wound. Bleeding noted to pants and shoes, pt on xarelto. No trauma alert or code trauma as per MD Granda.

## 2021-03-23 NOTE — ED PROVIDER NOTE - OBJECTIVE STATEMENT
Pertinent HPI/PMH/PSH/FHx/SHx and Review of Systems contained within  HPI:  81 y/o male p/w puncture wound to RLQ. Pt was taking out a garbage bag that had glass in hit and the bag hit his abd causing 1cm superficial laceration that is about 1.5 cm deep. Pt is not having any abd pain expect at site of puncture wound. Tdap UTD.  PMH/PSH relevant for: pre-DM, creatinine elevation, thyroid nodule, shingles, asthma, eczema, prostate CA s/p prostatectomy, RLE DVT on Xarelto  ROS negative for: fever, Chest pain, SOB, Nausea, vomiting, diarrhea, abdominal pain, dysuria    FamilyHx and SocialHx not otherwise contributory Pertinent HPI/PMH/PSH/FHx/SHx and Review of Systems contained within  HPI:  81 y/o male p/w puncture wound to RLQ. Pt was taking out a garbage bag that had glass in it and the bag hit his abd causing 1cm superficial laceration that is about 1.5 cm deep. wound is actively bleeding liekly since pt is on xarelto. Pt is not having any abd pain expect at site of puncture wound. Tdap UTD.  PMH/PSH relevant for: pre-DM, creatinine elevation, thyroid nodule, shingles, asthma, eczema, prostate CA s/p prostatectomy, RLE DVT on Xarelto  ROS negative for: fever, Chest pain, SOB, Nausea, vomiting, diarrhea, abdominal pain, dysuria    FamilyHx and SocialHx not otherwise contributory

## 2021-03-23 NOTE — ED PROVIDER NOTE - NSFOLLOWUPINSTRUCTIONS_ED_ALL_ED_FT
FOLLOW UP WITH PMD  WITHIN 1-2DAYS, CALL TO MAKE APPOINTMENT  COME BACK TO ED IF YOUR CONDITION WORSENS OR IF YOU DEVELOP FEVER GREATER THAN 100.4F, CHEST PAIN,  SHORTNESS OF BREATH OR ANY OTHER SYMPTOMS CONCERNING TO YOU  TAKE TYLENOL (ACETAMINOPHEN) 650 MG EVERY 6 HOURS AS NEEDED FOR PAIN    IF YOU WERE PRESRCIBED ANY MEDICATIONS FROM TODAY'S VISIT, TAKE THEM AS DIRECTED (keflex)  2 stitches need to be removed in 7-10days    Stitches, Staples, or Adhesive Wound Closure  ImageDoctors use stitches (sutures), staples, and certain glue (skin adhesives) to hold your skin together while it heals (wound closure). You may need this treatment after you have surgery or if you cut your skin accidentally. These methods help your skin heal more quickly. They also make it less likely that you will have a scar.    What are the different kinds of wound closures?  There are many options for wound closure. The one that your doctor uses depends on how deep and large your wound is.    Adhesive Glue     To use this glue to close a wound, your doctor holds the edges of the wound together and paints the glue on the surface of your skin. You may need more than one layer of glue. Then the wound may be covered with a light bandage (dressing).    This type of skin closure may be used for small wounds that are not deep (superficial). Using glue for wound closure is less painful than other methods. It does not require a medicine that numbs the area. This method also leaves nothing to be removed. Adhesive glue is often used for children and on facial wounds.    Adhesive glue cannot be used for wounds that are deep, uneven, or bleeding. It is not used inside of a wound.    Adhesive Strips     These strips are made of sticky (adhesive), porous paper. They are placed across your skin edges like a regular adhesive bandage. You leave them on until they fall off.    Adhesive strips may be used to close very superficial wounds. They may also be used along with sutures to improve closure of your skin edges.    Sutures     Sutures are the oldest method of wound closure. Sutures can be made from natural or synthetic materials. They can be made from a material that your body can break down as your wound heals (absorbable), or they can be made from a material that needs to be removed from your skin (nonabsorbable). They come in many different strengths and sizes.    Your doctor attaches the sutures to a steel needle on one end. Sutures can be passed through your skin, or through the tissues beneath your skin. Then they are tied and cut. Your skin edges may be closed in one continuous stitch or in separate stitches.    Sutures are strong and can be used for all kinds of wounds. Absorbable sutures may be used to close tissues under the skin. The disadvantage of sutures is that they may cause skin reactions that lead to infection. Nonabsorbable sutures need to be removed.    Staples     When surgical staples are used to close a wound, the edges of your skin on both sides of the wound are brought close together. A staple is placed across the wound, and an instrument secures the edges together. Staples are often used to close surgical cuts (incisions).    Staples are faster to use than sutures, and they cause less reaction from your skin. Staples need to be removed using a tool that bends the staples away from your skin.    How do I care for my wound closure?  Take medicines only as told by your doctor.  If you were prescribed an antibiotic medicine for your wound, finish it all even if you start to feel better.  Use ointments or creams only as told by your doctor.  Wash your hands with soap and water before and after touching your wound.  Do not soak your wound in water. Do not take baths, swim, or use a hot tub until your doctor says it is okay.  Ask your doctor when you can start showering. Cover your wound if told by your doctor.  Do not take out your own sutures or staples.  Do not pick at your wound. Picking can cause an infection.  Keep all follow-up visits as told by your doctor. This is important.  How long will I have my wound closure?  Leave adhesive glue on your skin until the glue peels away.  Leave adhesive strips on your skin until they fall off.  Absorbable sutures will dissolve within several days.  Nonabsorbable sutures and staples must be removed. The location of the wound will determine how long they stay in. This can range from several days to a couple of weeks.    YOUR NUPUR WOUND NEEDS FOLLOW UP FOR A WOUND CHECK, SUTURE REMOVAL OR STAPLE REMOVAL IN  ______ DAYS    IF YOU HAD SUTURES WERE PLACED TODAY:  _________ SUTURES WERE PLACED  When should I seek help for my wound closure?  Contact your doctor if:    You have a fever.  You have chills.  You have redness, puffiness (swelling), or pain at the site of your wound.  You have fluid, blood, or pus coming from your wound.  There is a bad smell coming from your wound.  The skin edges of your wound start to separate after your sutures have been removed.  Your wound becomes thick, raised, and darker in color after your sutures come out (scarring).    This information is not intended to replace advice given to you by your health care provider. Make sure you discuss any questions you have with your health care provider.

## 2021-03-23 NOTE — CONSULT NOTE ADULT - ASSESSMENT
80M with abdominal wound found to extend only to the level of the abdominal subcutaneous fat after injury with glass while taking out the garbage.    -CT scan reviewed, no surgical pathology requiring intervention at this time  -As wound was thoroughly irrigated and closed, no further intervention needed  -monitor for bleeding and infection at site of injury  -pain control prn    Plan discussed with Dr. Calderon

## 2021-03-23 NOTE — ED PROVIDER NOTE - CLINICAL SUMMARY MEDICAL DECISION MAKING FREE TEXT BOX
Puncture wound to RLQ.  Pt with 1cm superficial laceration. Placed tip and wound is 1.5 cm deep. Pt without any abd pain. Abd nontender. Downgraded code trauma and trauma alert since pt very well appearing and abd nontender. Plan: Laceration repair, Keflex for prophylaxis. Puncture wound to RLQ.  Pt with 1cm superficial laceration. Placed tip and wound is 1.5 cm deep. Pt without any abd pain. Abd nontender. Downgraded code trauma and trauma alert since pt very well appearing and abd nontender. Plan: Laceration repair, Keflex for infectiokn prophylaxis, ct to make sure no mesentary  or peritoneal violation

## 2021-03-23 NOTE — ED PROVIDER NOTE - NS ED ROS FT
Review of Systems:  	•	CONSTITUTIONAL: no fever  	•	SKIN: no rash. +laceration  	•	RESPIRATORY: no shortness of breath  	•	CARDIAC: no chest pain  	•	GI:  no abd pain, no nausea, no vomiting, no diarrhea  	•	GENITO-URINARY:  no dysuria  	•	MUSCULOSKELETAL:  no back pain  	•	NEUROLOGIC: no weakness  	•	ALLERGY: no rhinorrhea  	•	PSYSCHIATRIC: appropriate concern about symptoms

## 2021-03-23 NOTE — ED PROCEDURE NOTE - ATTENDING CONTRIBUTION TO CARE
Attending Attestation:  Cesar Granda MD,  I have personally performed a history and physical examination of the patient and discussed management with the resident as well as the patient.  I reviewed the resident's note and agree with the documented findings and plan of care.  I have authored and modified critical sections of the Provider Note, including but not limited to HPI, ROS, Physical Exam and MDM.

## 2021-03-23 NOTE — CONSULT NOTE ADULT - SUBJECTIVE AND OBJECTIVE BOX
79 y/o male p/w puncture wound to RLQ. Pt was taking out a garbage bag that had glass in it and the bag hit his abdomen causing 1cm superficial laceration that is about 1.5 cm deep. Pt is not having any abd pain expect at site of puncture wound. Tdap UTD. PMH/PSH relevant for: pre-DM, creatinine elevation, thyroid nodule, shingles, asthma, eczema, prostate CA s/p prostatectomy, RLE DVT on Xarelto. ED probed to wound and found the wound to be superficial. Wound was thoroughly irrigated and closed primarily by ED.            Vital Signs Last 24 Hrs  T(C): 36.7 (23 Mar 2021 12:45), Max: 36.7 (23 Mar 2021 12:45)  T(F): 98 (23 Mar 2021 12:45), Max: 98 (23 Mar 2021 12:45)  HR: 85 (23 Mar 2021 12:45) (85 - 86)  BP: 146/78 (23 Mar 2021 12:45) (133/85 - 146/78)  BP(mean): --  RR: 18 (23 Mar 2021 12:45) (18 - 18)  SpO2: 97% (23 Mar 2021 12:45) (95% - 97%)    Labs:                                14.0   6.33  )-----------( 164      ( 23 Mar 2021 11:30 )             42.6     CBC Full  -  ( 23 Mar 2021 11:30 )  WBC Count : 6.33 K/uL  RBC Count : 4.40 M/uL  Hemoglobin : 14.0 g/dL  Hematocrit : 42.6 %  Platelet Count - Automated : 164 K/uL  Mean Cell Volume : 96.8 fl  Mean Cell Hemoglobin : 31.8 pg  Mean Cell Hemoglobin Concentration : 32.9 gm/dL  Auto Neutrophil # : 4.25 K/uL  Auto Lymphocyte # : 1.00 K/uL  Auto Monocyte # : 0.53 K/uL  Auto Eosinophil # : 0.51 K/uL  Auto Basophil # : 0.03 K/uL  Auto Neutrophil % : 67.0 %  Auto Lymphocyte % : 15.8 %  Auto Monocyte % : 8.4 %  Auto Eosinophil % : 8.1 %  Auto Basophil % : 0.5 %    03-23    142  |  109<H>  |  20  ----------------------------<  149<H>  4.4   |  30  |  1.20    Ca    9.0      23 Mar 2021 11:30        PT/INR - ( 23 Mar 2021 11:30 )   PT: 18.1 sec;   INR: 1.58 ratio         PTT - ( 23 Mar 2021 11:30 )  PTT:43.7 sec      Meds:      Radiology:  CT ABDOMEN    IMPRESSION:  Right lower quadrant subcutaneous fat stranding compatible with stated history of puncture wound, however there is no evidence of peritoneal violation.    Enlarged prostate with extension posteriorly, compatible with known history of prostate cancer, likely with extracapsular extension. Nonemergent MRI can be performed to better evaluate as warranted.       VOMITING/NAUSEA/PAIN

## 2021-03-23 NOTE — ED PROVIDER NOTE - PROGRESS NOTE DETAILS
jill: PT seen and reassessed.  Patient symptomatically improved.  Wants to be discharged AAOX3, NAD, VSS.  Discussed test results w/ patient. Patient verbalized understanding of hospital course and outpatient plans, has decisional making capacity.  Will f/u w/ pmd in the next few days; patient will call for an appointment. Will return to the ED if there is any worsening of symptoms.  Patient able to ambulate at baseline, is tolerating PO intake. Has safe way of getting home.

## 2021-04-22 ENCOUNTER — APPOINTMENT (OUTPATIENT)
Dept: UROLOGY | Facility: CLINIC | Age: 81
End: 2021-04-22

## 2021-04-26 ENCOUNTER — RX RENEWAL (OUTPATIENT)
Age: 81
End: 2021-04-26

## 2021-04-27 ENCOUNTER — RX RENEWAL (OUTPATIENT)
Age: 81
End: 2021-04-27

## 2021-05-20 ENCOUNTER — APPOINTMENT (OUTPATIENT)
Dept: DERMATOLOGY | Facility: CLINIC | Age: 81
End: 2021-05-20
Payer: MEDICARE

## 2021-05-20 DIAGNOSIS — L30.9 DERMATITIS, UNSPECIFIED: ICD-10-CM

## 2021-05-20 PROCEDURE — 99214 OFFICE O/P EST MOD 30 MIN: CPT

## 2021-05-20 RX ORDER — TRIAMCINOLONE ACETONIDE 1 MG/G
0.1 OINTMENT TOPICAL
Qty: 1 | Refills: 0 | Status: ACTIVE | COMMUNITY
Start: 2021-05-20 | End: 1900-01-01

## 2021-05-26 ENCOUNTER — OUTPATIENT (OUTPATIENT)
Dept: OUTPATIENT SERVICES | Facility: HOSPITAL | Age: 81
LOS: 1 days | End: 2021-05-26
Payer: MEDICARE

## 2021-05-26 ENCOUNTER — APPOINTMENT (OUTPATIENT)
Dept: UROLOGY | Facility: CLINIC | Age: 81
End: 2021-05-26
Payer: MEDICARE

## 2021-05-26 DIAGNOSIS — R35.0 FREQUENCY OF MICTURITION: ICD-10-CM

## 2021-05-26 DIAGNOSIS — C61 MALIGNANT NEOPLASM OF PROSTATE: ICD-10-CM

## 2021-05-26 DIAGNOSIS — Z90.79 ACQUIRED ABSENCE OF OTHER GENITAL ORGAN(S): Chronic | ICD-10-CM

## 2021-05-26 DIAGNOSIS — R97.20 ELEVATED PROSTATE SPECIFIC ANTIGEN [PSA]: ICD-10-CM

## 2021-05-26 PROCEDURE — 99214 OFFICE O/P EST MOD 30 MIN: CPT | Mod: 25

## 2021-05-26 PROCEDURE — 96402U: CUSTOM | Mod: NC

## 2021-05-26 PROCEDURE — 96402 CHEMO HORMON ANTINEOPL SQ/IM: CPT

## 2021-05-26 RX ORDER — LEUPROLIDE ACETATE 30 MG/.5ML
30 INJECTION, SUSPENSION, EXTENDED RELEASE SUBCUTANEOUS DAILY
Qty: 1 | Refills: 0 | Status: COMPLETED | OUTPATIENT
Start: 2021-05-25 | End: 2021-05-26

## 2021-06-23 ENCOUNTER — APPOINTMENT (OUTPATIENT)
Dept: DERMATOLOGY | Facility: CLINIC | Age: 81
End: 2021-06-23
Payer: MEDICARE

## 2021-06-23 PROCEDURE — 11102 TANGNTL BX SKIN SINGLE LES: CPT

## 2021-06-23 PROCEDURE — 99214 OFFICE O/P EST MOD 30 MIN: CPT | Mod: 25

## 2021-06-23 NOTE — PHYSICAL EXAM
[FreeTextEntry3] : Skin examination performed of the face, neck, chest, hands, lower legs;\par The patient is well, alert and oriented, pleasant and cooperative.\par Eyelids, conjunctivae, oral mucosa, digits and nails all normal.  \par No cervical adenopathy.\par \par Erythematous scaling patches with inflammation and induration;  R medial lower leg, with varicosities;  \par similar, less extensive changes on Left\par \par resolving eczematous patches on back;\par \par pearly papule with telangiectasiae R lower cheek/jawline

## 2021-06-23 NOTE — HISTORY OF PRESENT ILLNESS
[de-identified] : Pt. presents for skin check;\par c/o few spots of concern;  \par Severity:  mild  \par Modifying factors:  none\par Associated symptoms:  none\par Context:  no association with activity\par \par has Hx eczema, also varicose veins;  uses support socks- was flaring on back, improved with intermittent clobetasol, aquaphor;  still some persistent on legs

## 2021-06-23 NOTE — ASSESSMENT
[FreeTextEntry1] : Therapeutic options and their risks and benefits; along with multiple diagnostic possibilities were discussed at length; risks and benefits of further study were discussed;\par \par The patient was instructed to check portal and/or call the office in one week for biopsy results.\par Plan to treat by surgical excision if the biopsy is positive. \par \par Eczema/stasis;  improved;  continue aquaphor; \par clobetasol prn only;  discussed proper use at length; \par

## 2021-07-07 LAB — CORE LAB BIOPSY: NORMAL

## 2021-07-21 ENCOUNTER — NON-APPOINTMENT (OUTPATIENT)
Age: 81
End: 2021-07-21

## 2021-08-16 ENCOUNTER — APPOINTMENT (OUTPATIENT)
Dept: DERMATOLOGY | Facility: CLINIC | Age: 81
End: 2021-08-16
Payer: MEDICARE

## 2021-08-16 ENCOUNTER — EMERGENCY (EMERGENCY)
Facility: HOSPITAL | Age: 81
LOS: 0 days | Discharge: HOME | End: 2021-08-16
Attending: EMERGENCY MEDICINE
Payer: MEDICARE

## 2021-08-16 VITALS — WEIGHT: 235.89 LBS | HEIGHT: 69 IN

## 2021-08-16 VITALS
SYSTOLIC BLOOD PRESSURE: 167 MMHG | DIASTOLIC BLOOD PRESSURE: 81 MMHG | RESPIRATION RATE: 18 BRPM | HEART RATE: 82 BPM | OXYGEN SATURATION: 97 %

## 2021-08-16 DIAGNOSIS — Z85.828 PERSONAL HISTORY OF OTHER MALIGNANT NEOPLASM OF SKIN: ICD-10-CM

## 2021-08-16 DIAGNOSIS — Z48.01 ENCOUNTER FOR CHANGE OR REMOVAL OF SURGICAL WOUND DRESSING: ICD-10-CM

## 2021-08-16 DIAGNOSIS — N40.1 BENIGN PROSTATIC HYPERPLASIA WITH LOWER URINARY TRACT SYMPTOMS: ICD-10-CM

## 2021-08-16 DIAGNOSIS — J44.9 CHRONIC OBSTRUCTIVE PULMONARY DISEASE, UNSPECIFIED: ICD-10-CM

## 2021-08-16 DIAGNOSIS — Z85.46 PERSONAL HISTORY OF MALIGNANT NEOPLASM OF PROSTATE: ICD-10-CM

## 2021-08-16 DIAGNOSIS — Z90.79 ACQUIRED ABSENCE OF OTHER GENITAL ORGAN(S): Chronic | ICD-10-CM

## 2021-08-16 DIAGNOSIS — Z88.8 ALLERGY STATUS TO OTHER DRUGS, MEDICAMENTS AND BIOLOGICAL SUBSTANCES: ICD-10-CM

## 2021-08-16 PROCEDURE — 14040 TIS TRNFR F/C/C/M/N/A/G/H/F: CPT

## 2021-08-16 PROCEDURE — 99282 EMERGENCY DEPT VISIT SF MDM: CPT

## 2021-08-16 PROCEDURE — 99283 EMERGENCY DEPT VISIT LOW MDM: CPT

## 2021-08-16 NOTE — ED ADULT TRIAGE NOTE - CHIEF COMPLAINT QUOTE
pt c/o bleeding s/p cancerous skin tissue  on R cheek removed earlier. on xarelto. pressure applied to site.

## 2021-08-17 ENCOUNTER — APPOINTMENT (OUTPATIENT)
Dept: DERMATOLOGY | Facility: CLINIC | Age: 81
End: 2021-08-17
Payer: MEDICARE

## 2021-08-17 DIAGNOSIS — D48.5 NEOPLASM OF UNCERTAIN BEHAVIOR OF SKIN: ICD-10-CM

## 2021-08-17 PROCEDURE — 99024 POSTOP FOLLOW-UP VISIT: CPT

## 2021-08-17 NOTE — ED PROVIDER NOTE - NEURO NEGATIVE STATEMENT, MLM
Declines
no loss of consciousness, no gait abnormality, no headache, no sensory deficits, and no weakness.

## 2021-08-17 NOTE — ED PROVIDER NOTE - NSICDXPASTMEDICALHX_GEN_ALL_CORE_FT
PAST MEDICAL HISTORY:  Abnormal EKG     Chronic obstructive asthma denies recent hospitalizations or intubations    Creatinine elevation 7/19 ; with urinary retention    Eczema     Enlarged prostate with lower urinary tract symptoms (LUTS)     Pre-diabetes fs     Prostate CA S/P Open Suprapubic Prostatectomy    Shingles 03/2019    Thyroid nodule Hyperthyroidism s/p radioactive idione tx 1980

## 2021-08-17 NOTE — ED PROVIDER NOTE - PATIENT PORTAL LINK FT
You can access the FollowMyHealth Patient Portal offered by [facility] by registering at the following website: [email or web address]. By joining [employer]’s FollowMyHealth portal, you will also be able to view your health information using other applications (apps) compatible with our system.

## 2021-08-17 NOTE — ED PROVIDER NOTE - RESPIRATORY NEGATIVE STATEMENT, MLM
Pt here for rx chk.  Picked up first PAL's recently and dva is blurry.  Got 2 pair.  Made a [employer].  NVA ok.    Technician notes reviewed and agree.    A:  Refractive Error:  Pt did not like more minus for distance- made her eyes pull.  P;  Decreased cyl by 0.25 od,os.  Asked pt to discuss type of PAL she has with [employer] and recheck PD and seg ht.   no chest pain, no cough, and no shortness of breath.

## 2021-08-17 NOTE — HISTORY OF PRESENT ILLNESS
[FreeTextEntry1] : wound check [de-identified] : 80 year old here for wound check s/p excision and flap repair on right lower cheek.\par bleeding last night. doing well today.

## 2021-08-17 NOTE — ED PROVIDER NOTE - OBJECTIVE STATEMENT
pt prseents with rigfht cheek oozing bleeding from 4 cm incision made by dermatologist earlier in day he is on xarelto no lightheadness or dizziness. bleeding is controlled with direct pressure to area. no new trauma to region

## 2021-08-17 NOTE — ED PROVIDER NOTE - SKIN, MLM
right cheek incision clean intact with minmial oozing blood throyugh suture lines no overt active bleeding

## 2021-08-20 LAB — CORE LAB BIOPSY: NORMAL

## 2021-08-23 ENCOUNTER — APPOINTMENT (OUTPATIENT)
Dept: DERMATOLOGY | Facility: CLINIC | Age: 81
End: 2021-08-23
Payer: MEDICARE

## 2021-08-23 PROCEDURE — 99024 POSTOP FOLLOW-UP VISIT: CPT

## 2021-08-23 NOTE — HISTORY OF PRESENT ILLNESS
[de-identified] : Patient presents for suture removal- \par wound healed well, no signs infection\par sutures removed, dressed;\par Path:  Margins negative\par \par

## 2021-09-30 ENCOUNTER — OUTPATIENT (OUTPATIENT)
Dept: OUTPATIENT SERVICES | Facility: HOSPITAL | Age: 81
LOS: 1 days | End: 2021-09-30
Payer: MEDICARE

## 2021-09-30 ENCOUNTER — APPOINTMENT (OUTPATIENT)
Dept: UROLOGY | Facility: CLINIC | Age: 81
End: 2021-09-30
Payer: MEDICARE

## 2021-09-30 DIAGNOSIS — Z90.79 ACQUIRED ABSENCE OF OTHER GENITAL ORGAN(S): Chronic | ICD-10-CM

## 2021-09-30 DIAGNOSIS — R35.0 FREQUENCY OF MICTURITION: ICD-10-CM

## 2021-09-30 PROCEDURE — 99213 OFFICE O/P EST LOW 20 MIN: CPT | Mod: 25

## 2021-09-30 PROCEDURE — 96402U: CUSTOM | Mod: NC

## 2021-09-30 PROCEDURE — 96402 CHEMO HORMON ANTINEOPL SQ/IM: CPT

## 2021-09-30 RX ORDER — LEUPROLIDE ACETATE 30 MG/.5ML
30 INJECTION, SUSPENSION, EXTENDED RELEASE SUBCUTANEOUS DAILY
Qty: 1 | Refills: 0 | Status: COMPLETED | OUTPATIENT
Start: 2021-09-28 | End: 2021-09-30

## 2021-09-30 RX ORDER — LEUPROLIDE ACETATE 30 MG/.5ML
30 INJECTION, SUSPENSION, EXTENDED RELEASE SUBCUTANEOUS
Refills: 0 | Status: COMPLETED | OUTPATIENT
Start: 2021-09-30

## 2021-09-30 RX ADMIN — LEUPROLIDE ACETATE 0 MG: KIT SUBCUTANEOUS at 00:00

## 2021-10-01 LAB
APPEARANCE: CLEAR
BACTERIA: NEGATIVE
BILIRUBIN URINE: NEGATIVE
BLOOD URINE: NEGATIVE
COLOR: YELLOW
GLUCOSE QUALITATIVE U: ABNORMAL
HYALINE CASTS: 0 /LPF
KETONES URINE: NEGATIVE
LEUKOCYTE ESTERASE URINE: NEGATIVE
MICROSCOPIC-UA: NORMAL
NITRITE URINE: NEGATIVE
PH URINE: 5.5
PROTEIN URINE: NORMAL
PSA FREE FLD-MCNC: 6 %
PSA FREE SERPL-MCNC: 0.11 NG/ML
PSA SERPL-MCNC: 1.9 NG/ML
RED BLOOD CELLS URINE: 1 /HPF
SPECIFIC GRAVITY URINE: 1.03
SQUAMOUS EPITHELIAL CELLS: 1 /HPF
UROBILINOGEN URINE: NORMAL
WHITE BLOOD CELLS URINE: 1 /HPF

## 2021-10-12 DIAGNOSIS — C61 MALIGNANT NEOPLASM OF PROSTATE: ICD-10-CM

## 2021-10-21 ENCOUNTER — RX RENEWAL (OUTPATIENT)
Age: 81
End: 2021-10-21

## 2021-12-01 ENCOUNTER — APPOINTMENT (OUTPATIENT)
Dept: DERMATOLOGY | Facility: CLINIC | Age: 81
End: 2021-12-01
Payer: MEDICARE

## 2021-12-01 PROCEDURE — 99214 OFFICE O/P EST MOD 30 MIN: CPT

## 2021-12-01 RX ORDER — MUPIROCIN 20 MG/G
2 OINTMENT TOPICAL
Qty: 1 | Refills: 2 | Status: ACTIVE | COMMUNITY
Start: 2021-12-01 | End: 1900-01-01

## 2021-12-01 NOTE — PHYSICAL EXAM
[FreeTextEntry3] : Erythematous scaling patches with inflammation \par Stasis changes, pigmentation;  b/l LEs; \par \par few crusted areas;  nares;

## 2021-12-01 NOTE — ASSESSMENT
[FreeTextEntry1] : Stasis Dermatitis/LDS\par exacerbated recently; \par \par Therapeutic options and their risks and benefits; along with multiple diagnostic possibilities were discussed at length; risks and benefits of further study were discussed;\par \par has clobetasol for prn use; \par tacroliumus 0.1 ointment maintenance \par has support stockings;  need to start using; \par \par also;  mupirocin to nose to eradicate possible staph carriage; \par recheck at upcoming TBSE in 3/2022

## 2021-12-01 NOTE — HISTORY OF PRESENT ILLNESS
[de-identified] : Pt. for check of legs;  \par Hx stasis, cellulitis;  worse recently; \par also with problems (?infection) in nose;

## 2021-12-02 RX ORDER — TACROLIMUS 1 MG/G
0.1 OINTMENT TOPICAL
Qty: 1 | Refills: 3 | Status: ACTIVE | COMMUNITY
Start: 2021-12-01

## 2022-01-13 NOTE — PROGRESS NOTE ADULT - REASON FOR ADMISSION
Patient presents for suture removal. The wound is well healed without signs of infection.  The sutures are removed. Wound care and activity instructions given. Return prn.    
ARF, obstructive uropathy, hematuria

## 2022-02-03 ENCOUNTER — OUTPATIENT (OUTPATIENT)
Dept: OUTPATIENT SERVICES | Facility: HOSPITAL | Age: 82
LOS: 1 days | End: 2022-02-03
Payer: MEDICARE

## 2022-02-03 ENCOUNTER — APPOINTMENT (OUTPATIENT)
Dept: UROLOGY | Facility: CLINIC | Age: 82
End: 2022-02-03
Payer: MEDICARE

## 2022-02-03 DIAGNOSIS — R97.20 ELEVATED PROSTATE, SPECIFIC ANTIGEN [PSA]: ICD-10-CM

## 2022-02-03 DIAGNOSIS — R35.0 FREQUENCY OF MICTURITION: ICD-10-CM

## 2022-02-03 DIAGNOSIS — Z90.79 ACQUIRED ABSENCE OF OTHER GENITAL ORGAN(S): Chronic | ICD-10-CM

## 2022-02-03 PROCEDURE — 96402U: CUSTOM | Mod: NC

## 2022-02-03 PROCEDURE — 99213 OFFICE O/P EST LOW 20 MIN: CPT | Mod: 25

## 2022-02-03 PROCEDURE — 96402 CHEMO HORMON ANTINEOPL SQ/IM: CPT

## 2022-02-03 RX ORDER — LEUPROLIDE ACETATE 30 MG/.5ML
30 INJECTION, SUSPENSION, EXTENDED RELEASE SUBCUTANEOUS
Refills: 0 | Status: COMPLETED | OUTPATIENT
Start: 2022-02-03

## 2022-02-03 RX ADMIN — LEUPROLIDE ACETATE 0 MG: KIT SUBCUTANEOUS at 00:00

## 2022-02-04 LAB
PSA FREE FLD-MCNC: 6 %
PSA FREE SERPL-MCNC: 0.05 NG/ML
PSA SERPL-MCNC: 0.9 NG/ML
VIT B12 SERPL-MCNC: 382 PG/ML

## 2022-02-07 DIAGNOSIS — R97.20 ELEVATED PROSTATE SPECIFIC ANTIGEN [PSA]: ICD-10-CM

## 2022-02-07 DIAGNOSIS — C61 MALIGNANT NEOPLASM OF PROSTATE: ICD-10-CM

## 2022-03-02 ENCOUNTER — APPOINTMENT (OUTPATIENT)
Dept: DERMATOLOGY | Facility: CLINIC | Age: 82
End: 2022-03-02

## 2022-03-08 ENCOUNTER — APPOINTMENT (OUTPATIENT)
Dept: DERMATOLOGY | Facility: CLINIC | Age: 82
End: 2022-03-08

## 2022-03-08 ENCOUNTER — APPOINTMENT (OUTPATIENT)
Dept: DERMATOLOGY | Facility: CLINIC | Age: 82
End: 2022-03-08
Payer: MEDICARE

## 2022-03-08 PROCEDURE — 11102 TANGNTL BX SKIN SINGLE LES: CPT

## 2022-03-08 PROCEDURE — 99213 OFFICE O/P EST LOW 20 MIN: CPT | Mod: 25

## 2022-03-08 NOTE — PHYSICAL EXAM
[FreeTextEntry3] : Skin examination performed of the face, neck, chest, hands, lower legs;\par The patient is well, alert and oriented, pleasant and cooperative.\par Eyelids, conjunctivae, oral mucosa, digits and nails all normal.  \par No cervical adenopathy.\par \par Erythematous scaling patches with inflammation and induration;  R medial lower leg, with varicosities;  \par similar, less extensive changes on Left\par \par resolving eczematous patches on back;\par \par healed surgical scar;  R lower cheek/jawline- mild hypertrophy; \par \par pearly papule with telangiectasiae - pigmented;  Left chest;

## 2022-03-08 NOTE — HISTORY OF PRESENT ILLNESS
[de-identified] : Pt. presents for skin check;\par c/o few spots of concern;  one spot on chest that occasionally bleeds;\par Severity:  mild  \par Modifying factors:  none\par Associated symptoms:  none\par Context:  no association with activity\par \par Recent BCC R lower cheek excised 7/2021

## 2022-03-08 NOTE — ASSESSMENT
[FreeTextEntry1] : Therapeutic options and their risks and benefits; along with multiple diagnostic possibilities were discussed at length; risks and benefits of further study were discussed;\par \par The patient was instructed to check portal and/or call the office in one week for biopsy results.\par Plan to treat by D&C if the biopsy is positive. r/o pigmented BCC\par \par Eczema/stasis;  improved;  continue aquaphor; \par clobetasol prn only;  discussed proper use at length; \par \par Follow up for TBSE in 6 months

## 2022-03-23 LAB — CORE LAB BIOPSY: NORMAL

## 2022-04-12 ENCOUNTER — APPOINTMENT (OUTPATIENT)
Dept: DERMATOLOGY | Facility: CLINIC | Age: 82
End: 2022-04-12
Payer: MEDICARE

## 2022-04-12 PROCEDURE — 17262 DSTRJ MAL LES T/A/L 1.1-2.0: CPT

## 2022-04-18 ENCOUNTER — RX RENEWAL (OUTPATIENT)
Age: 82
End: 2022-04-18

## 2022-04-29 ENCOUNTER — EMERGENCY (EMERGENCY)
Facility: HOSPITAL | Age: 82
LOS: 0 days | Discharge: HOME | End: 2022-04-29
Attending: EMERGENCY MEDICINE
Payer: MEDICARE

## 2022-04-29 VITALS
OXYGEN SATURATION: 93 % | DIASTOLIC BLOOD PRESSURE: 84 MMHG | HEART RATE: 74 BPM | RESPIRATION RATE: 17 BRPM | SYSTOLIC BLOOD PRESSURE: 148 MMHG | TEMPERATURE: 98 F

## 2022-04-29 VITALS — WEIGHT: 229.94 LBS | HEIGHT: 69 IN

## 2022-04-29 DIAGNOSIS — E03.9 HYPOTHYROIDISM, UNSPECIFIED: ICD-10-CM

## 2022-04-29 DIAGNOSIS — C61 MALIGNANT NEOPLASM OF PROSTATE: ICD-10-CM

## 2022-04-29 DIAGNOSIS — J45.909 UNSPECIFIED ASTHMA, UNCOMPLICATED: ICD-10-CM

## 2022-04-29 DIAGNOSIS — L03.116 CELLULITIS OF LEFT LOWER LIMB: ICD-10-CM

## 2022-04-29 DIAGNOSIS — Z90.79 ACQUIRED ABSENCE OF OTHER GENITAL ORGAN(S): Chronic | ICD-10-CM

## 2022-04-29 DIAGNOSIS — L03.115 CELLULITIS OF RIGHT LOWER LIMB: ICD-10-CM

## 2022-04-29 DIAGNOSIS — Z20.822 CONTACT WITH AND (SUSPECTED) EXPOSURE TO COVID-19: ICD-10-CM

## 2022-04-29 DIAGNOSIS — R22.43 LOCALIZED SWELLING, MASS AND LUMP, LOWER LIMB, BILATERAL: ICD-10-CM

## 2022-04-29 DIAGNOSIS — Z88.9 ALLERGY STATUS TO UNSPECIFIED DRUGS, MEDICAMENTS AND BIOLOGICAL SUBSTANCES: ICD-10-CM

## 2022-04-29 LAB
ALBUMIN SERPL ELPH-MCNC: 3.6 G/DL — SIGNIFICANT CHANGE UP (ref 3.3–5)
ALP SERPL-CCNC: 73 U/L — SIGNIFICANT CHANGE UP (ref 40–120)
ALT FLD-CCNC: 36 U/L — SIGNIFICANT CHANGE UP (ref 12–78)
ANION GAP SERPL CALC-SCNC: 6 MMOL/L — SIGNIFICANT CHANGE UP (ref 5–17)
APTT BLD: 37.1 SEC — HIGH (ref 27.5–35.5)
AST SERPL-CCNC: 24 U/L — SIGNIFICANT CHANGE UP (ref 15–37)
BASOPHILS # BLD AUTO: 0.05 K/UL — SIGNIFICANT CHANGE UP (ref 0–0.2)
BASOPHILS NFR BLD AUTO: 0.6 % — SIGNIFICANT CHANGE UP (ref 0–2)
BILIRUB SERPL-MCNC: 1 MG/DL — SIGNIFICANT CHANGE UP (ref 0.2–1.2)
BUN SERPL-MCNC: 21 MG/DL — SIGNIFICANT CHANGE UP (ref 7–23)
CALCIUM SERPL-MCNC: 9.3 MG/DL — SIGNIFICANT CHANGE UP (ref 8.5–10.1)
CHLORIDE SERPL-SCNC: 105 MMOL/L — SIGNIFICANT CHANGE UP (ref 96–108)
CO2 SERPL-SCNC: 29 MMOL/L — SIGNIFICANT CHANGE UP (ref 22–31)
CREAT SERPL-MCNC: 1.3 MG/DL — SIGNIFICANT CHANGE UP (ref 0.5–1.3)
EGFR: 56 ML/MIN/1.73M2 — LOW
EOSINOPHIL # BLD AUTO: 0.44 K/UL — SIGNIFICANT CHANGE UP (ref 0–0.5)
EOSINOPHIL NFR BLD AUTO: 4.8 % — SIGNIFICANT CHANGE UP (ref 0–6)
GLUCOSE SERPL-MCNC: 144 MG/DL — HIGH (ref 70–99)
HCT VFR BLD CALC: 43.1 % — SIGNIFICANT CHANGE UP (ref 39–50)
HGB BLD-MCNC: 14 G/DL — SIGNIFICANT CHANGE UP (ref 13–17)
IMM GRANULOCYTES NFR BLD AUTO: 0.3 % — SIGNIFICANT CHANGE UP (ref 0–1.5)
INR BLD: 1.3 RATIO — HIGH (ref 0.88–1.16)
LYMPHOCYTES # BLD AUTO: 0.98 K/UL — LOW (ref 1–3.3)
LYMPHOCYTES # BLD AUTO: 10.8 % — LOW (ref 13–44)
MCHC RBC-ENTMCNC: 30.8 PG — SIGNIFICANT CHANGE UP (ref 27–34)
MCHC RBC-ENTMCNC: 32.5 GM/DL — SIGNIFICANT CHANGE UP (ref 32–36)
MCV RBC AUTO: 94.7 FL — SIGNIFICANT CHANGE UP (ref 80–100)
MONOCYTES # BLD AUTO: 0.88 K/UL — SIGNIFICANT CHANGE UP (ref 0–0.9)
MONOCYTES NFR BLD AUTO: 9.7 % — SIGNIFICANT CHANGE UP (ref 2–14)
NEUTROPHILS # BLD AUTO: 6.71 K/UL — SIGNIFICANT CHANGE UP (ref 1.8–7.4)
NEUTROPHILS NFR BLD AUTO: 73.8 % — SIGNIFICANT CHANGE UP (ref 43–77)
PLATELET # BLD AUTO: 206 K/UL — SIGNIFICANT CHANGE UP (ref 150–400)
POTASSIUM SERPL-MCNC: 4.3 MMOL/L — SIGNIFICANT CHANGE UP (ref 3.5–5.3)
POTASSIUM SERPL-SCNC: 4.3 MMOL/L — SIGNIFICANT CHANGE UP (ref 3.5–5.3)
PROT SERPL-MCNC: 7.8 GM/DL — SIGNIFICANT CHANGE UP (ref 6–8.3)
PROTHROM AB SERPL-ACNC: 15.1 SEC — HIGH (ref 10.5–13.4)
RBC # BLD: 4.55 M/UL — SIGNIFICANT CHANGE UP (ref 4.2–5.8)
RBC # FLD: 14.3 % — SIGNIFICANT CHANGE UP (ref 10.3–14.5)
SARS-COV-2 RNA SPEC QL NAA+PROBE: SIGNIFICANT CHANGE UP
SODIUM SERPL-SCNC: 140 MMOL/L — SIGNIFICANT CHANGE UP (ref 135–145)
WBC # BLD: 9.09 K/UL — SIGNIFICANT CHANGE UP (ref 3.8–10.5)
WBC # FLD AUTO: 9.09 K/UL — SIGNIFICANT CHANGE UP (ref 3.8–10.5)

## 2022-04-29 PROCEDURE — 99284 EMERGENCY DEPT VISIT MOD MDM: CPT | Mod: 25

## 2022-04-29 PROCEDURE — 99284 EMERGENCY DEPT VISIT MOD MDM: CPT | Mod: FS

## 2022-04-29 PROCEDURE — 85730 THROMBOPLASTIN TIME PARTIAL: CPT

## 2022-04-29 PROCEDURE — 85610 PROTHROMBIN TIME: CPT

## 2022-04-29 PROCEDURE — 80053 COMPREHEN METABOLIC PANEL: CPT

## 2022-04-29 PROCEDURE — 87040 BLOOD CULTURE FOR BACTERIA: CPT

## 2022-04-29 PROCEDURE — 93971 EXTREMITY STUDY: CPT | Mod: 26,RT

## 2022-04-29 PROCEDURE — 93971 EXTREMITY STUDY: CPT | Mod: RT

## 2022-04-29 PROCEDURE — 85025 COMPLETE CBC W/AUTO DIFF WBC: CPT

## 2022-04-29 PROCEDURE — 36415 COLL VENOUS BLD VENIPUNCTURE: CPT

## 2022-04-29 PROCEDURE — 87635 SARS-COV-2 COVID-19 AMP PRB: CPT

## 2022-04-29 RX ADMIN — Medication 100 MILLIGRAM(S): at 19:27

## 2022-04-29 NOTE — ED STATDOCS - NSFOLLOWUPINSTRUCTIONS_ED_ALL_ED_FT
Cellulitis, Adult       Cellulitis is a skin infection. The infected area is often warm, red, swollen, and sore. It occurs most often in the arms and lower legs. It is very important to get treated for this condition.      What are the causes?    This condition is caused by bacteria. The bacteria enter through a break in the skin, such as a cut, burn, insect bite, open sore, or crack.      What increases the risk?    This condition is more likely to occur in people who:  •Have a weak body defense system (immune system).       •Have open cuts, burns, bites, or scrapes on the skin.      •Are older than 60 years of age.      •Have a blood sugar problem (diabetes).       •Have a long-lasting (chronic) liver disease (cirrhosis) or kidney disease.      •Are very overweight (obese).    •Have a skin problem, such as:  •Itchy rash (eczema).      •Slow movement of blood in the veins (venous stasis).      •Fluid buildup below the skin (edema).        •Have been treated with high-energy rays (radiation).      •Use IV drugs.         What are the signs or symptoms?    Symptoms of this condition include:•Skin that is:  •Red.      •Streaking.      •Spotting.      •Swollen.      •Sore or painful when you touch it.      •Warm.        •A fever.      •Chills.       •Blisters.        How is this diagnosed?    This condition is diagnosed based on:  •Medical history.      •Physical exam.      •Blood tests.      •Imaging tests.        How is this treated?    Treatment for this condition may include:  •Medicines to treat infections or allergies.    •Home care, such as:  •Rest.      •Placing cold or warm cloths (compresses) on the skin.        •Hospital care, if the condition is very bad.        Follow these instructions at home:    Medicines     •Take over-the-counter and prescription medicines only as told by your doctor.      •If you were prescribed an antibiotic medicine, take it as told by your doctor. Do not stop taking it even if you start to feel better.        General instructions      •Drink enough fluid to keep your pee (urine) pale yellow.      • Do not touch or rub the infected area.      •Raise (elevate) the infected area above the level of your heart while you are sitting or lying down.      •Place cold or warm cloths on the area as told by your doctor.      •Keep all follow-up visits as told by your doctor. This is important.        Contact a doctor if:    •You have a fever.      •You do not start to get better after 1–2 days of treatment.      •Your bone or joint under the infected area starts to hurt after the skin has healed.      •Your infection comes back. This can happen in the same area or another area.      •You have a swollen bump in the area.      •You have new symptoms.      •You feel ill and have muscle aches and pains.        Get help right away if:    •Your symptoms get worse.      •You feel very sleepy.      •You throw up (vomit) or have watery poop (diarrhea) for a long time.      •You see red streaks coming from the area.      •Your red area gets larger.      •Your red area turns dark in color.      These symptoms may represent a serious problem that is an emergency. Do not wait to see if the symptoms will go away. Get medical help right away. Call your local emergency services (911 in the [place] Do not drive yourself to the hospital.       Summary    •Cellulitis is a skin infection. The area is often warm, red, swollen, and sore.      •This condition is treated with medicines, rest, and cold and warm cloths.      •Take all medicines only as told by your doctor.      •Tell your doctor if symptoms do not start to get better after 1–2 days of treatment.      This information is not intended to replace advice given to you by your health care provider. Make sure you discuss any questions you have with your health care provider.      Document Revised: 05/09/2019 Document Reviewed: 05/09/2019    [employer] Patient Education © 2022 [employer]

## 2022-04-29 NOTE — ED STATDOCS - NS ED ATTENDING STATEMENT MOD
This was a shared visit with the [facility]. I reviewed and verified the documentation and independently performed the documented:

## 2022-04-29 NOTE — ED STATDOCS - CLINICAL SUMMARY MEDICAL DECISION MAKING FREE TEXT BOX
Pt with concern for cellulitis. Will sono r/o DVT, will check basic labs. Discussed outpatient antibiotics vs. admission. Pt requests trial oral antibiotics outpatient.

## 2022-04-29 NOTE — ED STATDOCS - OBJECTIVE STATEMENT
80 y/o male with PMHx of chronic eczema presents to the ED c/o 2 days of legs are more red and tender than usual, R>L. Saw PCP who suggested to come to ER for IV antibiotics. Pt denies fevers and chills.

## 2022-04-29 NOTE — ED STATDOCS - MUSCULOSKELETAL, MLM
range of motion is not limites. extremities: b/l erythema to LEs, worse on the right with some RLE swelling. No calf tenderness b/l.

## 2022-04-29 NOTE — ED STATDOCS - PROGRESS NOTE DETAILS
82 y/o Male with chronic eczema presents to ED c/o swelling and redness to lower legs for 2 days.  Worse to R leg.  Pt was not on oral antibiotics prior to coming to ED today.  Neg fevers, chills.                 On exam, bilat lower extremities with well demarcated erythema to lower legs, R>L.   WBC not elevated.  Pt not febrile. Doppler Neg for DVT.  Used marker to christen areas of infection.  Will dc home on oral Clindamycin TID.  Elevate legs.  Monitor for worsening infection.  Return precautions given.  F/U with PMD.

## 2022-04-29 NOTE — ED ADULT TRIAGE NOTE - CHIEF COMPLAINT QUOTE
Pt presents to ER c/o redness and swelling of b/l lower extremity; more significant on RLE. Pt sent from  PCP for IV abx r/t b/l LE cellulitis. Denies fever/chills

## 2022-06-16 ENCOUNTER — APPOINTMENT (OUTPATIENT)
Dept: UROLOGY | Facility: CLINIC | Age: 82
End: 2022-06-16
Payer: MEDICARE

## 2022-06-16 ENCOUNTER — OUTPATIENT (OUTPATIENT)
Dept: OUTPATIENT SERVICES | Facility: HOSPITAL | Age: 82
LOS: 1 days | End: 2022-06-16
Payer: MEDICARE

## 2022-06-16 VITALS
TEMPERATURE: 97.1 F | SYSTOLIC BLOOD PRESSURE: 164 MMHG | WEIGHT: 233 LBS | DIASTOLIC BLOOD PRESSURE: 84 MMHG | HEIGHT: 69 IN | BODY MASS INDEX: 34.51 KG/M2

## 2022-06-16 DIAGNOSIS — R97.20 ELEVATED PROSTATE SPECIFIC ANTIGEN [PSA]: ICD-10-CM

## 2022-06-16 DIAGNOSIS — R35.0 FREQUENCY OF MICTURITION: ICD-10-CM

## 2022-06-16 DIAGNOSIS — Z90.79 ACQUIRED ABSENCE OF OTHER GENITAL ORGAN(S): Chronic | ICD-10-CM

## 2022-06-16 DIAGNOSIS — C61 MALIGNANT NEOPLASM OF PROSTATE: ICD-10-CM

## 2022-06-16 PROCEDURE — 99213 OFFICE O/P EST LOW 20 MIN: CPT | Mod: 25

## 2022-06-16 PROCEDURE — 96402 CHEMO HORMON ANTINEOPL SQ/IM: CPT

## 2022-06-16 PROCEDURE — 96402U: CUSTOM | Mod: NC

## 2022-06-16 RX ORDER — LEUPROLIDE ACETATE 30 MG/.5ML
30 INJECTION, SUSPENSION, EXTENDED RELEASE SUBCUTANEOUS DAILY
Qty: 1 | Refills: 0 | Status: COMPLETED | OUTPATIENT
Start: 2022-06-14 | End: 2022-06-16

## 2022-06-16 RX ORDER — LEUPROLIDE ACETATE 30 MG/.5ML
30 INJECTION, SUSPENSION, EXTENDED RELEASE SUBCUTANEOUS
Refills: 0 | Status: COMPLETED | OUTPATIENT
Start: 2022-06-16

## 2022-06-16 RX ORDER — LEUPROLIDE ACETATE 30 MG/.5ML
30 INJECTION, SUSPENSION, EXTENDED RELEASE SUBCUTANEOUS DAILY
Qty: 1 | Refills: 0 | Status: COMPLETED | OUTPATIENT
Start: 2022-02-01 | End: 2022-06-16

## 2022-06-16 RX ADMIN — LEUPROLIDE ACETATE 0 MG: KIT SUBCUTANEOUS at 00:00

## 2022-06-17 LAB
PSA FREE FLD-MCNC: 7 %
PSA FREE SERPL-MCNC: 0.08 NG/ML
PSA SERPL-MCNC: 1.04 NG/ML

## 2022-09-21 ENCOUNTER — APPOINTMENT (OUTPATIENT)
Dept: DERMATOLOGY | Facility: CLINIC | Age: 82
End: 2022-09-21

## 2022-10-12 ENCOUNTER — APPOINTMENT (OUTPATIENT)
Dept: DERMATOLOGY | Facility: CLINIC | Age: 82
End: 2022-10-12

## 2022-10-12 DIAGNOSIS — C44.519 BASAL CELL CARCINOMA OF SKIN OF OTHER PART OF TRUNK: ICD-10-CM

## 2022-10-12 PROCEDURE — 99214 OFFICE O/P EST MOD 30 MIN: CPT

## 2022-10-12 NOTE — ASSESSMENT
[FreeTextEntry1] : Complete skin examination is negative for malignancy; Multiple new concerns were addressed and discussed.\par Therapeutic options and their risks and benefits; along with multiple diagnostic possibilities were discussed at length;\par risks and benefits of skin biopsy and/or other further study were discussed;\par \par recent BCC x 2 did well\par \par Eczema/stasis;  improved;  continue aquaphor; \par clobetasol prn only;  discussed proper use at length; \par \par Discussed Rinvoq/Dupixent briefly - for now pt. able to maintain on current regimen\par \par Follow up for TBSE in 6 months

## 2022-10-12 NOTE — HISTORY OF PRESENT ILLNESS
[de-identified] : Pt. presents for skin check;\par c/o few spots of concern;  check of lower legs\par Severity:  mild  \par Modifying factors:  none\par Associated symptoms:  none\par Context:  no association with activity\par \par Recent BCCs Left chest D&C 4/2022;\par  R lower cheek excised 7/2021

## 2022-10-27 ENCOUNTER — APPOINTMENT (OUTPATIENT)
Dept: UROLOGY | Facility: CLINIC | Age: 82
End: 2022-10-27

## 2022-10-27 ENCOUNTER — OUTPATIENT (OUTPATIENT)
Dept: OUTPATIENT SERVICES | Facility: HOSPITAL | Age: 82
LOS: 1 days | End: 2022-10-27
Payer: MEDICARE

## 2022-10-27 VITALS
SYSTOLIC BLOOD PRESSURE: 166 MMHG | RESPIRATION RATE: 16 BRPM | HEART RATE: 73 BPM | OXYGEN SATURATION: 97 % | DIASTOLIC BLOOD PRESSURE: 82 MMHG | TEMPERATURE: 97.8 F

## 2022-10-27 DIAGNOSIS — R97.20 ELEVATED PROSTATE SPECIFIC ANTIGEN [PSA]: ICD-10-CM

## 2022-10-27 DIAGNOSIS — Z90.79 ACQUIRED ABSENCE OF OTHER GENITAL ORGAN(S): Chronic | ICD-10-CM

## 2022-10-27 DIAGNOSIS — R35.0 FREQUENCY OF MICTURITION: ICD-10-CM

## 2022-10-27 DIAGNOSIS — C61 MALIGNANT NEOPLASM OF PROSTATE: ICD-10-CM

## 2022-10-27 DIAGNOSIS — N13.8 BENIGN PROSTATIC HYPERPLASIA WITH LOWER URINARY TRACT SYMPMS: ICD-10-CM

## 2022-10-27 DIAGNOSIS — N40.1 BENIGN PROSTATIC HYPERPLASIA WITH LOWER URINARY TRACT SYMPMS: ICD-10-CM

## 2022-10-27 DIAGNOSIS — R97.20 ELEVATED PROSTATE, SPECIFIC ANTIGEN [PSA]: ICD-10-CM

## 2022-10-27 DIAGNOSIS — N40.1 BENIGN PROSTATIC HYPERPLASIA WITH LOWER URINARY TRACT SYMPTOMS: ICD-10-CM

## 2022-10-27 LAB
PSA FREE FLD-MCNC: 7 %
PSA FREE SERPL-MCNC: 0.15 NG/ML
PSA SERPL-MCNC: 2.29 NG/ML

## 2022-10-27 PROCEDURE — 99214 OFFICE O/P EST MOD 30 MIN: CPT

## 2022-10-27 PROCEDURE — 96402 CHEMO HORMON ANTINEOPL SQ/IM: CPT

## 2022-10-27 RX ORDER — LEUPROLIDE ACETATE 30 MG/.5ML
30 INJECTION, SUSPENSION, EXTENDED RELEASE SUBCUTANEOUS DAILY
Qty: 1 | Refills: 0 | Status: COMPLETED | OUTPATIENT
Start: 2022-10-25 | End: 2022-10-27

## 2022-10-27 RX ORDER — LEUPROLIDE ACETATE 30 MG/.5ML
30 INJECTION, SUSPENSION, EXTENDED RELEASE SUBCUTANEOUS
Refills: 0 | Status: COMPLETED | OUTPATIENT
Start: 2022-10-27

## 2022-10-27 RX ADMIN — LEUPROLIDE ACETATE 0 MG: KIT SUBCUTANEOUS at 00:00

## 2022-11-01 NOTE — PROGRESS NOTE ADULT - ASSESSMENT
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 Notes he has more wound care appointments upcoming. Notes no difficulties with household activities currently.       OBJECTIVE                                                                                                                     Vitals:  Blood Pressure (mmhg):      - Standing: 160/80, asymptomatic  After treadmill: 170/80 mm Hg               Stair Ambulation  - Assist Level: modified independent  - Rails: no rails  - Pattern: reciprocal             Treatment     Gait belt applied and used throughout session.  Therapeutic Exercise  - treadmill at 0.9-1.0 mph without UE support x 3:30   - treadmill at 0.8-0.9 mph without UE support x 2:30 // demos decreased step length with increased fatigue   - amb x 250' ind no AD     Neuromuscular Re-Education  - navigating over obstacles, foam pads, foam rollers, 6\" step // demos R LE leading favoring, verbal cues to lead with L LE - notes no familiar pain provocation  - 4 square laterally, diagonally with yellow ball in single UE // intermittent LOB initially with diagonals   - kicking soccer ball with both R and L LE // one instance of BLE crossover with adjusting, no LOB     Skilled input: tactile instruction/cues, as detailed above and verbal instruction/cues  for hand placement and palpation for techniques as described above and how they are pertinent to the patient's plan of care.    Home Exercise Program  Sit<>stand 10x every hour, walking in home      ASSESSMENT                                                                                                             Patient progressing well today, focus on increasing cardiovascular endurance with treadmill training today. Patient also participates today with increased dynamic balance exercises with navigating over obstacles, demos no LOB. Also participates in recreational activities that he is able to perform with son, able to catch self with instances of LOB due to kicking a soccer ball. Patient likely discharge next visit given progress. Patient would continue to benefit from participation in skilled physical therapy intervention to address the aforementioned deficits, meet established goals, and increase patient independence with functional mobility.   Patient Education:   Results of above outlined education: Demonstrates understanding and Verbalizes understanding    PLAN                                                                                                                           Suggestions for next session as indicated: PLAN: strengthening, endurance, balance    Goals  Long Term Goals: to be met by end of plan of care  1. Pt to ambulate 1000 ft even/unevens without AD Independently Status: met   2. Pt to negotiate stairs with 1 HR reciprocally Mod I Status: met   3. Pt to demonstrate 4 point increase in FGA to decrease fall risk (initial score: 18/30)  Status: met   4. Pt to demonstrate 5x STS <13 sec without UE support to demonstrate increased LE strength and improve transfer efficiency  Status: progressing/ongoing  5. Pt to be Independent with home ex program Status: progressing/ongoing  6. Pt's mother to report score of 36/40 on Neuro QOL Lower Extremity Function (Mobility )- Short Form to demonstrate improved patient perception of quality of life or Pt to demonstrate 40/40 on Neuro NQOL Lower Extremity Function Short form to demonstrate improved patient perception of QOL.        Myrna Quintero, PT, DPT    Therapy procedure time and total treatment time can be found documented on the Time  Entry flowsheet     78 M PMH Prostate ca (dx 20 years ago) no chemo/RT in past but with rising PSA, eczema poorly responsive to topical emollients, Zenker's diverticulum c/b dysphagia and intermittent aspiration, facial zoster, recent admission to [facility] for obstructive uropathy, admitted for open suprapubic prostatectomy.

## 2022-11-09 ENCOUNTER — APPOINTMENT (OUTPATIENT)
Dept: ULTRASOUND IMAGING | Facility: CLINIC | Age: 82
End: 2022-11-09

## 2022-11-09 ENCOUNTER — OUTPATIENT (OUTPATIENT)
Dept: OUTPATIENT SERVICES | Facility: HOSPITAL | Age: 82
LOS: 1 days | End: 2022-11-09
Payer: MEDICARE

## 2022-11-09 DIAGNOSIS — Z90.79 ACQUIRED ABSENCE OF OTHER GENITAL ORGAN(S): Chronic | ICD-10-CM

## 2022-11-09 DIAGNOSIS — N40.1 BENIGN PROSTATIC HYPERPLASIA WITH LOWER URINARY TRACT SYMPTOMS: ICD-10-CM

## 2022-11-09 PROCEDURE — 76770 US EXAM ABDO BACK WALL COMP: CPT | Mod: 26

## 2022-11-09 PROCEDURE — 76770 US EXAM ABDO BACK WALL COMP: CPT

## 2023-01-20 NOTE — PHYSICAL THERAPY INITIAL EVALUATION ADULT - LEVEL OF CONSCIOUSNESS, REHAB EVAL
alert M-Plasty Complex Repair Preamble Text (Leave Blank If You Do Not Want): Extensive wide undermining was performed.

## 2023-02-23 ENCOUNTER — APPOINTMENT (OUTPATIENT)
Dept: UROLOGY | Facility: CLINIC | Age: 83
End: 2023-02-23
Payer: MEDICARE

## 2023-02-23 ENCOUNTER — OUTPATIENT (OUTPATIENT)
Dept: OUTPATIENT SERVICES | Facility: HOSPITAL | Age: 83
LOS: 1 days | End: 2023-02-23
Payer: MEDICARE

## 2023-02-23 DIAGNOSIS — Z90.79 ACQUIRED ABSENCE OF OTHER GENITAL ORGAN(S): Chronic | ICD-10-CM

## 2023-02-23 DIAGNOSIS — R35.0 FREQUENCY OF MICTURITION: ICD-10-CM

## 2023-02-23 PROCEDURE — 96402U: CUSTOM | Mod: NC

## 2023-02-23 PROCEDURE — 99213 OFFICE O/P EST LOW 20 MIN: CPT | Mod: 25

## 2023-02-23 PROCEDURE — 96402 CHEMO HORMON ANTINEOPL SQ/IM: CPT

## 2023-02-23 RX ORDER — LEUPROLIDE ACETATE 30 MG/.5ML
30 INJECTION, SUSPENSION, EXTENDED RELEASE SUBCUTANEOUS
Refills: 0 | Status: COMPLETED | OUTPATIENT
Start: 2023-02-23

## 2023-02-23 RX ORDER — LEUPROLIDE ACETATE 30 MG/.5ML
30 INJECTION, SUSPENSION, EXTENDED RELEASE SUBCUTANEOUS DAILY
Qty: 1 | Refills: 0 | Status: COMPLETED | OUTPATIENT
Start: 2023-02-21 | End: 2023-02-23

## 2023-02-23 RX ADMIN — LEUPROLIDE ACETATE 0 MG: KIT SUBCUTANEOUS at 00:00

## 2023-02-24 DIAGNOSIS — C61 MALIGNANT NEOPLASM OF PROSTATE: ICD-10-CM

## 2023-02-24 DIAGNOSIS — R97.20 ELEVATED PROSTATE SPECIFIC ANTIGEN [PSA]: ICD-10-CM

## 2023-02-24 LAB
PSA FREE FLD-MCNC: 10 %
PSA FREE SERPL-MCNC: 0.51 NG/ML
PSA SERPL-MCNC: 5.21 NG/ML

## 2023-03-06 NOTE — ED PROVIDER NOTE - RELIEVING FACTORS
Upper respiratory infections are usually caused by viruses and, sometimes certain bacteria.  Antibiotics don't help the vast majority of people recover any quicker even when caused by a bacteria.  The body will fight this infection but it needs to be treated well in order to help heal itself.  Rest as needed.  It is ok to reduce food intake if appetite is poor but it is quite important to maintain/increase fluid intake.    For cough, dextromethorphan/guaifenesin combinations help loosen secretions and suppress cough safely without significant risk of sedation. Often 2 puffs four times daily of an albuterol inhaler will help with bronchitis.  This is a prescription medicine.    For nasal congestion and sinus pressure, pseudoephedrine (Sudafed) or phenylephrine is often helpful but it can cause elevations in blood pressure and insomnia.  Short courses of a nasal decongestant spray (Afrin or Neosinephrine) can be appropriate but their use should be restricted to 3 days due to the high risk of nasal addiction.    For pain and fevers, acetaminophen (Tylenol) is most appropriate.  Ibuprofen (Advil) or naproxen (Aleve) are useful too and last longer but they can cause elevation of blood pressure or stomach problems.    Antihistamines (Benadryl, Dimetapp, etc.) cause sedation, confusion, bowel and urinary abnormalities and are of little use for infectious causes of cough and nasal congestion.  Their use should be reserved for allergic symptoms.     none

## 2023-03-29 NOTE — H&P ADULT - PROBLEM SELECTOR PROBLEM 4
Identified pt with two pt identifiers(name and ). Reviewed record in preparation for visit and have obtained necessary documentation. Chief Complaint   Patient presents with    Establish Care     Sports form filled out         Vitals:    23 1515   BP: 108/70   Pulse: 89   Resp: 16   Temp: 97.8 °F (36.6 °C)   TempSrc: Oral   SpO2: 97%   Weight: 142 lb (64.4 kg)   Height: 5' 8.5\" (1.74 m)   PainSc:   0 - No pain       Health Maintenance Due   Topic    Hepatitis B Vaccine (1 of 3 - 3-dose series)    IPV Peds Age 0-18 (1 of 3 - 4-dose series)    COVID-19 Vaccine (1)    Varicella Vaccine (1 of 2 - 2-dose childhood series)    Hepatitis A Peds Age 1-18 (1 of 2 - 2-dose series)    MMR Peds Age 1-18 (1 of 2 - Standard series)    DTaP/Tdap/Td series (1 - Tdap)    Flu Vaccine (1)       Coordination of Care Questionnaire:  :   1) Have you been to an emergency room, urgent care, or hospitalized since your last visit? If yes, where when, and reason for visit? no       2. Have seen or consulted any other health care provider since your last visit? If yes, where when, and reason for visit? NO      Patient is accompanied by Father I have received verbal consent from Zee Pruitt to discuss any/all medical information while they are present in the room. Prostate cancer

## 2023-04-12 ENCOUNTER — APPOINTMENT (OUTPATIENT)
Dept: DERMATOLOGY | Facility: CLINIC | Age: 83
End: 2023-04-12
Payer: MEDICARE

## 2023-04-12 DIAGNOSIS — L28.1 PRURIGO NODULARIS: ICD-10-CM

## 2023-04-12 PROCEDURE — 99214 OFFICE O/P EST MOD 30 MIN: CPT

## 2023-04-12 NOTE — ASSESSMENT
[FreeTextEntry1] : Complete skin examination is negative for malignancy; Multiple new concerns were addressed and discussed.\par Therapeutic options and their risks and benefits; along with multiple diagnostic possibilities were discussed at length;\par risks and benefits of skin biopsy and/or other further study were discussed;\par \par recent BCC x 2 did well\par \par Eczema/stasis;  improved;  continue aquaphor; \par clobetasol prn only;  discussed proper use at length; \par \par Discussed Rinvoq/Dupixent - discussed Dupixent at length, pt. skittish about injections; \par will discuss with PMD;  discussed that Dupixent will also treat prurigo \par \par Follow up for TBSE in 6 months

## 2023-04-12 NOTE — HISTORY OF PRESENT ILLNESS
[de-identified] : Pt. presents for skin check;\par c/o few spots of concern;  also with chronic itching \par Severity:  mild  \par Modifying factors:  none\par Associated symptoms:  none\par Context:  no association with activity\par \par Recent BCCs Left chest D&C 4/2022;\par  R lower cheek excised 7/2021

## 2023-04-12 NOTE — PHYSICAL EXAM
[FreeTextEntry3] : Skin examination performed of the face, neck, chest, hands, lower legs;\par The patient is well, alert and oriented, pleasant and cooperative.\par Eyelids, conjunctivae, oral mucosa, digits and nails all normal.  \par No cervical adenopathy.\par \par Erythematous scaling patches with inflammation and induration;  R medial lower leg, with varicosities;  \par similar, less extensive changes on Left\par \par ++ eczematous patches on back;prurigo/LSC on thighs\par \par healed surgical scar;  R lower cheek/jawline- mild hypertrophy; \par healed D&C scar  Left chest;\par \par + pronounced stasis changes/LDS on both lower legs; mild inflammation\par \par No lesions suspicious for malignancy.

## 2023-05-27 NOTE — ASU PREOP CHECKLIST - VIA
2482 - 0199 RN Shift Summary    Patient admitted from PACU with stable vitals signs and BP WDL; Neurologically intact except from very slight intermittent word searching. Patient A&O x 4; complains of incisional pain, treated with ordered PRN medication, reports significant relief of symptoms. On 2L nasal cannula for comfort; Sats WDL; Bradycardic without ectopy; Diet advanced to regular; great oral intake of food and liquids. Great urine output out of Lea catheter. Skin WDL, incision covered with clean, dry and intact dressing. Family updated at bedside, all questions answered and concerns addressed.    ambulate

## 2023-06-05 NOTE — ED STATDOCS - CONSTITUTIONAL, MLM
[facility] Provider Note    Chief Complaint   Patient presents with   • Sinus Problem     Sinus pressure    • Cough       Patient presents with sinus pain pressure congestion cough for 1 to 2 weeks she states the sinus pressure and the cough are getting worse so she comes in for an evaluation she is a current smoker she does have a pulmonologist she denies diabetes she does not have a primary care doctor she states it is getting slightly harder to breathe due to her cough  No vomiting no abdominal pain no chest pain or shortness of breath at this time no head neck or back pain she states she has a lot of postnasal drip  She states she has ear fullness  She is active tolerating p.o. intake normal void  Patient is educated on how to obtain a primary care doctor through her insurance plan      Medications:  Current Outpatient Medications   Medication Sig   • albuterol 108 (90 Base) MCG/ACT inhaler Inhale 2 puffs into the lungs every 4 hours as needed for Shortness of Breath.   • benzonatate (TESSALON PERLES) 100 MG capsule Take 1 capsule by mouth 3 times daily as needed for Cough.   • cephalexin (KEFLEX) 500 MG capsule Take 1 capsule by mouth in the morning and 1 capsule at noon and 1 capsule in the evening. Do all this for 14 days.   • fluticasone-vilanterol (BREO ELLIPTA) 100-25 MCG/ACT inhaler Inhale 1 puff into the lungs daily.   • Multiple Vitamin (MULTIVITAMIN ADULT PO) as directed   • Cholecalciferol (vitamin D) 50 mcg (2,000 units) capsule    • fexofenadine (ALLEGRA) 60 MG tablet every 24 hours.   • levothyroxine 88 MCG tablet every 24 hours.   • Omega 3 1000 MG capsule every 24 hours.   • calcium carbonate-vitamin D (CALTRATE+D) 600-10 MG-MCG per tablet Take 2 tablets by mouth in the morning and 2 tablets at noon and 2 tablets in the evening. Take with meals. After 7 days take 2 tablets with meals twice daily.  After 7 more days take 2 tablets with meals once a day and then stop.   •  budesonide (PULMICORT) 0.25 MG/2ML nebulizer suspension Take 0.25 mg by nebulization in the morning and 0.25 mg in the evening.   • albuterol 108 (90 Base) MCG/ACT inhaler Inhale 1-2 puffs into the lungs every 4 hours as needed for Shortness of Breath or Wheezing.   • ALPRAZolam (XANAX) 0.5 MG tablet Take 0.5 mg by mouth 2 times daily as needed.   • desvenlafaxine (PRISTIQ) 25 MG 24 hr tablet Take 50 mg by mouth every morning.   • estradiol (ESTRACE) 1 MG tablet Take 1 mg by mouth every evening.   • lithium (ESKALITH) 450 MG CR tablet Take 450 mg by mouth in the morning and 450 mg in the evening.   • QUEtiapine (SEROquel) 50 MG tablet Take 50 mg by mouth nightly. PRN   • triamcinolone (NASACORT) 55 MCG/ACT nasal inhaler Spray 1 spray in each nostril every morning.     No current facility-administered medications for this visit.       Allergies:  ALLERGIES:   Allergen Reactions   • Penicillin G RASH   • Sulfa Antibiotics Other (See Comments)     Childhood  Childhood         Past Medical History  Past Medical History:   Diagnosis Date   • Anxiety disorder    • Benign neoplasm of adrenal gland, left    • COPD (chronic obstructive pulmonary disease) (CMD)    • COVID-19 vaccine administered     single dose Pfizer.   • Depression    • GERD (gastroesophageal reflux disease)    • Goiter    • Hyperparathyroidism (CMD)    • Hypothyroidism    • Mitral valve prolapse    • RA (rheumatoid arthritis) (CMD)        Past Surgical History:  Past Surgical History:   Procedure Laterality Date   • Extracapsular cataract removal w insert io lens prosth wo ecp     • Nasal scopy,open maxill sinus  1999   • Partial hysterectomy  1999       Family History:  Family History   Problem Relation Age of Onset   • Diabetes Mother         Type I Diabetic   • Multiple myeloma Mother    • Rheumatoid Arthritis Father    • Rheumatologic Disease Father         Rheumatic Fever   • Heart disease Father         due to rheumatic fever   • Other Brother          LEMS; Lambert-Eaton Myasthenic Syndrome       Social History:  Social History     Tobacco Use   • Smoking status: Every Day     Current packs/day: 0.50     Average packs/day: 0.5 packs/day for 47.4 years (23.7 pk-yrs)     Types: Cigarettes     Start date: 1976   • Smokeless tobacco: Never   Vaping Use   • Vaping Use: never used   Substance Use Topics   • Alcohol use: Never   • Drug use: Never       Patient's medications, allergies, past medical, surgical, and social history  were reviewed and updated as appropriate.    Review of Systems   Constitutional: Negative for activity change, appetite change, chills and fever.   HENT: Positive for congestion, ear pain, postnasal drip, rhinorrhea, sinus pressure and sinus pain. Negative for ear discharge, sore throat, trouble swallowing and voice change.    Respiratory: Positive for cough. Negative for chest tightness, shortness of breath and wheezing.    Cardiovascular: Negative for chest pain, palpitations and leg swelling.   Gastrointestinal: Negative for abdominal pain and vomiting.   Musculoskeletal: Negative for arthralgias and myalgias.   Skin: Negative for rash.   Neurological: Negative for headaches.   All other systems reviewed and are negative.      Objective     Visit Vitals  /87   Pulse 64   Temp 97.8 °F (36.6 °C)   Resp 18   Ht 6' (1.829 m)   Wt 89.8 kg (198 lb)   SpO2 96%   BMI 26.85 kg/m²       Physical Exam  Vitals reviewed.   Constitutional:       General: She is awake.      Appearance: Normal appearance. She is well-developed, well-groomed and normal weight. She is not ill-appearing, toxic-appearing or diaphoretic.   HENT:      Head: Normocephalic and atraumatic.      Right Ear: Hearing, tympanic membrane, ear canal and external ear normal.      Left Ear: Hearing, tympanic membrane, ear canal and external ear normal.      Nose: Congestion present.      Right Sinus: Maxillary sinus tenderness and frontal sinus tenderness present.      Left Sinus:  Maxillary sinus tenderness and frontal sinus tenderness present.      Mouth/Throat:      Lips: Pink. No lesions.      Mouth: Mucous membranes are moist. No oral lesions.      Pharynx: Oropharynx is clear. Uvula midline. No posterior oropharyngeal erythema.      Tonsils: No tonsillar exudate or tonsillar abscesses.      Neck: Full passive range of motion without pain, normal range of motion and neck supple.   Eyes:      General: Lids are normal. Vision grossly intact.   Cardiovascular:      Rate and Rhythm: Normal rate.      Pulses: Normal pulses.   Pulmonary:      Effort: Pulmonary effort is normal. No accessory muscle usage, respiratory distress or retractions.      Breath sounds: Normal air entry. No stridor, decreased air movement or transmitted upper airway sounds. Wheezing present.      Comments: Slight vague wheeze throughout  Skin:     General: Skin is warm.      Capillary Refill: Capillary refill takes less than 2 seconds.      Findings: No rash.   Neurological:      Mental Status: She is alert and oriented to person, place, and time.   Psychiatric:         Attention and Perception: Attention normal.         Mood and Affect: Mood normal.         Speech: Speech normal.         Behavior: Behavior is cooperative.         Thought Content: Thought content normal.         Cognition and Memory: Cognition normal.         Labs:   No results found for this visit on 06/05/23.     Imaging:  No results found.       Assessment:   1. Sinus headache    2. Cough due to bronchospasm           Plan:    Patient nontoxic patient in no cardiopulmonary distress exam and results reviewed with patient.  Patient in agreement with discharge with close follow-up with primary if symptoms worsen emergency room she is educated if symptoms do worsen with any chest pain or shortness of breath she may need to go to the ER she is in agreement prescription for albuterol, Tessalon and Keflex provided with medication education I did recommend  Augmentin patient declined she states the only thing that works for her is Keflex 3 times a day for 14 days  I have educated regarding the risks of taking this much antibiotics she states is the only thing that works due to her low immune system  She does not have a primary she does have a pulmonologist she is educated to follow-up with pulmonology she is educated on how to obtain a primary  She left in stable condition all questions answered  She does not want any swab testing here  Instructions provided as documented in the AVS.    Corinne Mcintyre NP  12:03 PM       normal... well appearing, well nourished, and in no apparent distress. well appearing, well nourished, and in no apparent distress. Nontoxic appearing.

## 2023-06-27 ENCOUNTER — RX RENEWAL (OUTPATIENT)
Age: 83
End: 2023-06-27

## 2023-06-27 RX ORDER — CLOBETASOL PROPIONATE 0.5 MG/G
0.05 OINTMENT TOPICAL
Qty: 60 | Refills: 0 | Status: ACTIVE | COMMUNITY
Start: 2019-09-25 | End: 1900-01-01

## 2023-06-28 ENCOUNTER — APPOINTMENT (OUTPATIENT)
Dept: UROLOGY | Facility: CLINIC | Age: 83
End: 2023-06-28
Payer: MEDICARE

## 2023-06-28 ENCOUNTER — OUTPATIENT (OUTPATIENT)
Dept: OUTPATIENT SERVICES | Facility: HOSPITAL | Age: 83
LOS: 1 days | End: 2023-06-28
Payer: MEDICARE

## 2023-06-28 VITALS
RESPIRATION RATE: 17 BRPM | TEMPERATURE: 98.2 F | DIASTOLIC BLOOD PRESSURE: 75 MMHG | SYSTOLIC BLOOD PRESSURE: 134 MMHG | HEART RATE: 62 BPM

## 2023-06-28 DIAGNOSIS — R97.20 ELEVATED PROSTATE, SPECIFIC ANTIGEN [PSA]: ICD-10-CM

## 2023-06-28 DIAGNOSIS — R35.0 FREQUENCY OF MICTURITION: ICD-10-CM

## 2023-06-28 DIAGNOSIS — Z90.79 ACQUIRED ABSENCE OF OTHER GENITAL ORGAN(S): Chronic | ICD-10-CM

## 2023-06-28 PROCEDURE — 96402 CHEMO HORMON ANTINEOPL SQ/IM: CPT

## 2023-06-28 PROCEDURE — 96402U: CUSTOM | Mod: NC

## 2023-06-28 PROCEDURE — 99213 OFFICE O/P EST LOW 20 MIN: CPT | Mod: 25

## 2023-06-28 RX ORDER — LEUPROLIDE ACETATE 30 MG/.5ML
30 INJECTION, SUSPENSION, EXTENDED RELEASE SUBCUTANEOUS
Refills: 0 | Status: COMPLETED | OUTPATIENT
Start: 2023-06-28

## 2023-06-28 RX ORDER — LEUPROLIDE ACETATE 30 MG/.5ML
30 INJECTION, SUSPENSION, EXTENDED RELEASE SUBCUTANEOUS DAILY
Qty: 1 | Refills: 0 | Status: COMPLETED | OUTPATIENT
Start: 2023-06-27 | End: 2023-06-28

## 2023-06-28 RX ADMIN — LEUPROLIDE ACETATE 0 MG: KIT SUBCUTANEOUS at 00:00

## 2023-06-29 DIAGNOSIS — C61 MALIGNANT NEOPLASM OF PROSTATE: ICD-10-CM

## 2023-06-29 DIAGNOSIS — R97.20 ELEVATED PROSTATE SPECIFIC ANTIGEN [PSA]: ICD-10-CM

## 2023-06-29 LAB
PSA FREE FLD-MCNC: 9 %
PSA FREE SERPL-MCNC: 0.65 NG/ML
PSA SERPL-MCNC: 7 NG/ML

## 2023-08-22 NOTE — ED PROVIDER NOTE - NSTIMEPROVIDERCAREINITIATE_GEN_ER
01-Jul-2019 23:17 [Normocephalic] : normocephalic [Atraumatic] : atraumatic [Sclera nonicteric] : sclera nonicteric [Supple] : supple [No Supraclavicular Adenopathy] : no supraclavicular adenopathy [No Cervical Adenopathy] : no cervical adenopathy [Clear to Auscultation Bilat] : clear to auscultation bilaterally [Normal Sinus Rhythm] : normal sinus rhythm [Examined in the supine and seated position] : examined in the supine and seated position [Bra Size: ___] : Bra Size: [unfilled] [No dominant masses] : no dominant masses in right breast  [No dominant masses] : no dominant masses left breast [No Nipple Retraction] : no left nipple retraction [No Nipple Discharge] : no left nipple discharge [No Axillary Lymphadenopathy] : no left axillary lymphadenopathy [Soft] : abdomen soft [No Edema] : no edema [No Rashes] : no rashes [No Ulceration] : no ulceration

## 2023-08-28 NOTE — INPATIENT CERTIFICATION FOR MEDICARE PATIENTS - NS ICMP TWO DAYS INPATIENT
What Type Of Note Output Would You Prefer (Optional)?: Standard Output Yes What Is The Reason For Today's Visit?: Full Body Skin Examination What Is The Reason For Today's Visit? (Being Monitored For X): concerning skin lesions on an annual basis Additional History: Patient states he would like just an above the waist skin exam, patient noticed while I was rooming him that he bumped his arm while in the shower this morning and it bled and scabbed over. Patient states that he has a lesion on his back that his wife wanted him to ask about.

## 2023-09-27 ENCOUNTER — OFFICE (OUTPATIENT)
Dept: URBAN - METROPOLITAN AREA CLINIC 104 | Facility: CLINIC | Age: 83
Setting detail: OPHTHALMOLOGY
End: 2023-09-27
Payer: MEDICARE

## 2023-09-27 DIAGNOSIS — H02.834: ICD-10-CM

## 2023-09-27 DIAGNOSIS — H02.831: ICD-10-CM

## 2023-09-27 DIAGNOSIS — H57.813: ICD-10-CM

## 2023-09-27 DIAGNOSIS — H53.40: ICD-10-CM

## 2023-09-27 PROCEDURE — 92082 INTERMEDIATE VISUAL FIELD XM: CPT | Performed by: OPHTHALMOLOGY

## 2023-09-27 PROCEDURE — 92285 EXTERNAL OCULAR PHOTOGRAPHY: CPT | Performed by: OPHTHALMOLOGY

## 2023-09-27 PROCEDURE — 99214 OFFICE O/P EST MOD 30 MIN: CPT | Performed by: OPHTHALMOLOGY

## 2023-09-27 ASSESSMENT — LID POSITION - DERMATOCHALASIS
OS_DERMATOCHALASIS: LUL 2+
OD_DERMATOCHALASIS: RUL 2+

## 2023-09-27 ASSESSMENT — VISUAL ACUITY
OS_BCVA: 20/40
OD_BCVA: 20/25

## 2023-09-27 ASSESSMENT — CONFRONTATIONAL VISUAL FIELD TEST (CVF)
OD_FINDINGS: FULL
OS_FINDINGS: FULL

## 2023-09-27 ASSESSMENT — TONOMETRY
OD_IOP_MMHG: 13
OS_IOP_MMHG: 15

## 2023-11-02 NOTE — PHYSICAL EXAM
FAMILY PRACTICE OFFICE VISIT    CHIEF COMPLAINT:    Chief Complaint   Patient presents with   • Follow-up     Follow up on diabetes.       SUBJECTIVE:  Maureen Alcazar is a 66 year old female who presents to clinic for checkup on type 2 diabetes, hypertension and hyperlipidemia.  Reports has been taking medication daily as directed.  Admits sugars have been well controlled.Continues to make healthy choices.    Lab Results   Component Value Date    HGBA1C 6.8 (H) 08/03/2023    HGBA1C 6.8 (H) 09/08/2022    HGBA1C 6.7 (H) 04/25/2022    Denies any hyper or hypoglycemia.     Urinating without any complaints, having regular bowel movements.   Mammogram in June 2023 within normal limits.  Review of systems:   Review of Systems   Constitutional: Negative for chills, diaphoresis, fatigue and fever.   HENT: Negative for congestion, ear pain, mouth sores, nosebleeds, postnasal drip, rhinorrhea, sinus pressure, sinus pain and sore throat.    Eyes: Negative.    Respiratory: Negative for apnea, cough, shortness of breath, wheezing and stridor.    Cardiovascular: Negative for chest pain, palpitations and leg swelling.   Gastrointestinal: Negative for constipation, diarrhea, nausea and vomiting.   Endocrine: Negative for cold intolerance, heat intolerance, polydipsia and polyphagia.   Genitourinary: Negative for dysuria, enuresis and urgency.   Musculoskeletal: Negative for back pain, joint swelling, myalgias, neck pain and neck stiffness.   Skin: Negative for color change, pallor, rash and wound.   Allergic/Immunologic: Negative for environmental allergies and food allergies.   Neurological: Negative for dizziness, tremors, seizures, syncope, numbness and headaches.   Hematological: Negative for adenopathy. Does not bruise/bleed easily.   Psychiatric/Behavioral: Negative for agitation, confusion, decreased concentration, dysphoric mood, self-injury and sleep disturbance. The patient is not hyperactive.          I have reviewed the  [General Appearance - Well Developed] : well developed [General Appearance - Well Nourished] : well nourished past medical history, family history, social history, medications and allergies listed in the medical record as obtained by my nursing staff and support staff and agree with their documentation.    OBJECTIVE:  VITALS:  Visit Vitals  /68   Pulse 91   Temp 98.7 °F (37.1 °C) (Tympanic)   Resp 17   Ht 5' 4\" (1.626 m)   Wt 69.6 kg (153 lb 7 oz)   SpO2 98%   BMI 26.34 kg/m²         PHYSICAL EXAM:   Physical Exam  Vitals reviewed.   Constitutional:       Appearance: Normal appearance.   HENT:      Head: Normocephalic.      Right Ear: Tympanic membrane, ear canal and external ear normal.      Left Ear: Tympanic membrane, ear canal and external ear normal.      Nose: Nose normal.      Mouth/Throat:      Mouth: Mucous membranes are moist.   Eyes:      Extraocular Movements: Extraocular movements intact.      Pupils: Pupils are equal, round, and reactive to light.   Cardiovascular:      Rate and Rhythm: Normal rate and regular rhythm.      Pulses: Normal pulses.      Heart sounds: Normal heart sounds, S1 normal and S2 normal.   Pulmonary:      Effort: Pulmonary effort is normal.      Breath sounds: Normal breath sounds. No decreased breath sounds, wheezing, rhonchi or rales.   Abdominal:      Palpations: Abdomen is soft.   Musculoskeletal:         General: Normal range of motion.      Cervical back: Normal range of motion and neck supple.      Right lower leg: No edema.      Left lower leg: No edema.   Skin:     General: Skin is warm.      Capillary Refill: Capillary refill takes less than 2 seconds.   Neurological:      General: No focal deficit present.      Mental Status: She is alert.   Psychiatric:         Mood and Affect: Mood normal.        ASSESSMENT:   1. Type 2 diabetes mellitus with diabetic polyneuropathy, with long-term current use of insulin (CMD)    2. Primary hypertension    3. Hyperlipidemia, unspecified hyperlipidemia type    4. Need for prophylactic vaccination with Streptococcus pneumoniae  (Pneumococcus) and Influenza vaccines    5. Need for influenza vaccination        PLAN:   Orders Placed This Encounter   • INFLUENZA QUADRIVALENT HIGH DOSE PRES FREE 0.7 ML VACC,IM   • PNEUMOCOCCAL CONJUGATE 20 VALENT VACC (PREVNAR-20)   • Microalbumin Urine Random   • Lipid Panel With Reflex   • Glycohemoglobin   • Comprehensive Metabolic Panel   • CBC with Automated Differential   • metformin (GLUCOPHAGE) 1000 MG tablet   • amLODIPine (NORVASC) 10 MG tablet   • gabapentin (NEURONTIN) 300 MG capsule   • dulaglutide (TRULICITY) 1.5 MG/0.5ML pen-injector     T2DM: Continue present management. Labs ordered, Will call pt with results. Discussed importance of diet and exercise. Call clinic if any questions or concerns.   Hypertension: Take Rx as directed. Avoid skipping doses. Limit salt and caffeine. Keep routine appt. Call clinic if any blurred vision or new complaints  HLD: Continue present management, labs ordered. Discussed importance of making healthy choices.   Pt verb understanding of plan of care and agrees.     Return in about 3 months (around 2/2/2024).     [Normal Appearance] : normal appearance [General Appearance - In No Acute Distress] : no acute distress [Well Groomed] : well groomed [Abdomen Soft] : soft [Costovertebral Angle Tenderness] : no ~M costovertebral angle tenderness [Abdomen Tenderness] : non-tender [Urethral Meatus] : meatus normal [Urinary Bladder Findings] : the bladder was normal on palpation [Scrotum] : the scrotum was normal [Testes Mass (___cm)] : there were no testicular masses [No Prostate Nodules] : no prostate nodules [Edema] : no peripheral edema [Respiration, Rhythm And Depth] : normal respiratory rhythm and effort [] : no respiratory distress [Exaggerated Use Of Accessory Muscles For Inspiration] : no accessory muscle use [Oriented To Time, Place, And Person] : oriented to person, place, and time [Affect] : the affect was normal [Mood] : the mood was normal [Normal Station and Gait] : the gait and station were normal for the patient's age [Not Anxious] : not anxious [No Palpable Adenopathy] : no palpable adenopathy [No Focal Deficits] : no focal deficits

## 2023-11-09 ENCOUNTER — APPOINTMENT (OUTPATIENT)
Dept: UROLOGY | Facility: CLINIC | Age: 83
End: 2023-11-09
Payer: MEDICARE

## 2023-11-09 ENCOUNTER — OUTPATIENT (OUTPATIENT)
Dept: OUTPATIENT SERVICES | Facility: HOSPITAL | Age: 83
LOS: 1 days | End: 2023-11-09
Payer: MEDICARE

## 2023-11-09 VITALS
HEART RATE: 63 BPM | RESPIRATION RATE: 16 BRPM | DIASTOLIC BLOOD PRESSURE: 89 MMHG | SYSTOLIC BLOOD PRESSURE: 178 MMHG | OXYGEN SATURATION: 97 %

## 2023-11-09 DIAGNOSIS — Z90.79 ACQUIRED ABSENCE OF OTHER GENITAL ORGAN(S): Chronic | ICD-10-CM

## 2023-11-09 DIAGNOSIS — R35.0 FREQUENCY OF MICTURITION: ICD-10-CM

## 2023-11-09 PROCEDURE — 96402 CHEMO HORMON ANTINEOPL SQ/IM: CPT

## 2023-11-09 PROCEDURE — 96401U: CUSTOM | Mod: NC

## 2023-11-09 PROCEDURE — 99214 OFFICE O/P EST MOD 30 MIN: CPT | Mod: 25

## 2023-11-09 RX ORDER — LEUPROLIDE ACETATE 30 MG/.5ML
30 INJECTION, SUSPENSION, EXTENDED RELEASE SUBCUTANEOUS DAILY
Qty: 1 | Refills: 0 | Status: COMPLETED | OUTPATIENT
Start: 2023-11-07 | End: 2023-11-09

## 2023-11-09 RX ORDER — METOPROLOL SUCCINATE 25 MG/1
25 TABLET, EXTENDED RELEASE ORAL DAILY
Refills: 0 | Status: ACTIVE | COMMUNITY
Start: 2023-11-09

## 2023-11-09 RX ORDER — LEUPROLIDE ACETATE 30 MG/.5ML
30 INJECTION, SUSPENSION, EXTENDED RELEASE SUBCUTANEOUS
Refills: 0 | Status: COMPLETED | OUTPATIENT
Start: 2023-11-09

## 2023-11-09 RX ORDER — LEUPROLIDE ACETATE 11.25 MG
KIT INTRAMUSCULAR
Refills: 0 | Status: DISCONTINUED | COMMUNITY
End: 2023-11-09

## 2023-11-09 RX ORDER — TADALAFIL 20 MG/1
20 TABLET, FILM COATED ORAL
Qty: 12 | Refills: 5 | Status: DISCONTINUED | COMMUNITY
Start: 2018-01-11 | End: 2023-11-09

## 2023-11-09 RX ADMIN — LEUPROLIDE ACETATE 0 MG: KIT SUBCUTANEOUS at 00:00

## 2023-11-10 DIAGNOSIS — C61 MALIGNANT NEOPLASM OF PROSTATE: ICD-10-CM

## 2023-11-10 LAB
PSA FREE FLD-MCNC: 10 %
PSA FREE SERPL-MCNC: 1.24 NG/ML
PSA SERPL-MCNC: 12.7 NG/ML

## 2024-01-24 ENCOUNTER — OFFICE (OUTPATIENT)
Dept: URBAN - METROPOLITAN AREA CLINIC 104 | Facility: CLINIC | Age: 84
Setting detail: OPHTHALMOLOGY
End: 2024-01-24
Payer: MEDICARE

## 2024-01-24 DIAGNOSIS — H02.831: ICD-10-CM

## 2024-01-24 DIAGNOSIS — H16.221: ICD-10-CM

## 2024-01-24 DIAGNOSIS — H57.813: ICD-10-CM

## 2024-01-24 DIAGNOSIS — H02.834: ICD-10-CM

## 2024-01-24 DIAGNOSIS — H53.40: ICD-10-CM

## 2024-01-24 DIAGNOSIS — H16.222: ICD-10-CM

## 2024-01-24 PROBLEM — H16.223 DRY EYE SYNDROME K SICCA; BOTH EYES: Status: ACTIVE | Noted: 2024-01-24

## 2024-01-24 PROCEDURE — 83861 MICROFLUID ANALY TEARS: CPT | Mod: QW,RT | Performed by: OPHTHALMOLOGY

## 2024-01-24 PROCEDURE — 99213 OFFICE O/P EST LOW 20 MIN: CPT | Performed by: OPHTHALMOLOGY

## 2024-01-24 PROCEDURE — 83861 MICROFLUID ANALY TEARS: CPT | Mod: QW,LT | Performed by: OPHTHALMOLOGY

## 2024-01-24 ASSESSMENT — LID POSITION - DERMATOCHALASIS
OS_DERMATOCHALASIS: LUL 2+
OD_DERMATOCHALASIS: RUL 2+

## 2024-01-24 ASSESSMENT — SUPERFICIAL PUNCTATE KERATITIS (SPK)
OS_SPK: 2+
OD_SPK: 2+

## 2024-01-24 ASSESSMENT — CONFRONTATIONAL VISUAL FIELD TEST (CVF)
OD_FINDINGS: FULL
OS_FINDINGS: FULL

## 2024-01-25 ENCOUNTER — APPOINTMENT (OUTPATIENT)
Dept: DERMATOLOGY | Facility: CLINIC | Age: 84
End: 2024-01-25
Payer: MEDICARE

## 2024-01-25 DIAGNOSIS — D48.5 NEOPLASM OF UNCERTAIN BEHAVIOR OF SKIN: ICD-10-CM

## 2024-01-25 DIAGNOSIS — L20.9 ATOPIC DERMATITIS, UNSPECIFIED: ICD-10-CM

## 2024-01-25 DIAGNOSIS — L81.4 OTHER MELANIN HYPERPIGMENTATION: ICD-10-CM

## 2024-01-25 DIAGNOSIS — I87.2 VENOUS INSUFFICIENCY (CHRONIC) (PERIPHERAL): ICD-10-CM

## 2024-01-25 DIAGNOSIS — L82.0 INFLAMED SEBORRHEIC KERATOSIS: ICD-10-CM

## 2024-01-25 DIAGNOSIS — C44.310 BASAL CELL CARCINOMA OF SKIN OF UNSPECIFIED PARTS OF FACE: ICD-10-CM

## 2024-01-25 PROCEDURE — 11102 TANGNTL BX SKIN SINGLE LES: CPT | Mod: 59

## 2024-01-25 PROCEDURE — 17110 DESTRUCTION B9 LES UP TO 14: CPT | Mod: 59

## 2024-01-25 PROCEDURE — 11103 TANGNTL BX SKIN EA SEP/ADDL: CPT | Mod: 59

## 2024-01-25 PROCEDURE — 99214 OFFICE O/P EST MOD 30 MIN: CPT | Mod: 25

## 2024-02-02 ENCOUNTER — AMBULATORY SURGERY CENTER (OUTPATIENT)
Dept: URBAN - METROPOLITAN AREA SURGERY 4 | Facility: SURGERY | Age: 84
Setting detail: OPHTHALMOLOGY
End: 2024-02-02
Payer: MEDICARE

## 2024-02-02 DIAGNOSIS — H02.831: ICD-10-CM

## 2024-02-02 DIAGNOSIS — H57.813: ICD-10-CM

## 2024-02-02 DIAGNOSIS — H02.834: ICD-10-CM

## 2024-02-02 PROCEDURE — 15823 BLEPHARP UPR EYELID XCSV SKN: CPT | Mod: 50 | Performed by: OPHTHALMOLOGY

## 2024-02-02 PROCEDURE — 67900 REPAIR BROW DEFECT: CPT | Mod: 50 | Performed by: OPHTHALMOLOGY

## 2024-02-06 LAB — CORE LAB BIOPSY: NORMAL

## 2024-02-13 NOTE — PATIENT PROFILE ADULT - MEDICATIONS/VISITS
Patient tolerated Left breast stereotactic biopsy well. Post procedure mammogram completed by mammography technologist and reviewed by DR Mccarty. The biopsy site had a small amount bleeding, no bruising or hematoma. Dressing of steri strips, 2x2 gauze and mepilex were applied, and ice packs were given. Discharge instruction reviewed with the patient and patient verbalized full understanding. All communications during procedure were given through a Brazilian video .    Patient received discharge instruction in writing as well as a small bag with extra supplies, if needed. Pt was discharged to home in stable condition at 0850.      no

## 2024-02-14 ENCOUNTER — OFFICE (OUTPATIENT)
Dept: URBAN - METROPOLITAN AREA CLINIC 104 | Facility: CLINIC | Age: 84
Setting detail: OPHTHALMOLOGY
End: 2024-02-14
Payer: MEDICARE

## 2024-02-14 ENCOUNTER — RX ONLY (RX ONLY)
Age: 84
End: 2024-02-14

## 2024-02-14 DIAGNOSIS — H57.813: ICD-10-CM

## 2024-02-14 DIAGNOSIS — H02.834: ICD-10-CM

## 2024-02-14 DIAGNOSIS — H16.223: ICD-10-CM

## 2024-02-14 DIAGNOSIS — H02.831: ICD-10-CM

## 2024-02-14 PROCEDURE — 99024 POSTOP FOLLOW-UP VISIT: CPT | Performed by: OPHTHALMOLOGY

## 2024-02-14 ASSESSMENT — LID POSITION - DERMATOCHALASIS
OD_DERMATOCHALASIS: ABSENT
OS_DERMATOCHALASIS: ABSENT

## 2024-02-14 ASSESSMENT — SUPERFICIAL PUNCTATE KERATITIS (SPK)
OS_SPK: 2+
OD_SPK: 2+

## 2024-02-14 ASSESSMENT — CONFRONTATIONAL VISUAL FIELD TEST (CVF)
OS_FINDINGS: FULL
OD_FINDINGS: FULL

## 2024-02-14 ASSESSMENT — LID POSITION - COMMENTS
OD_COMMENTS: WOUND C/D/I HEALING WELL GOOD HEIGHT AND GOOD SYMMETRY
OS_COMMENTS: WOUND C/D/I HEALING WELL GOOD HEIGHT AND GOOD SYMMETRY

## 2024-03-13 ENCOUNTER — APPOINTMENT (OUTPATIENT)
Dept: UROLOGY | Facility: CLINIC | Age: 84
End: 2024-03-13
Payer: MEDICARE

## 2024-03-13 ENCOUNTER — OUTPATIENT (OUTPATIENT)
Dept: OUTPATIENT SERVICES | Facility: HOSPITAL | Age: 84
LOS: 1 days | End: 2024-03-13
Payer: MEDICARE

## 2024-03-13 DIAGNOSIS — R35.0 FREQUENCY OF MICTURITION: ICD-10-CM

## 2024-03-13 DIAGNOSIS — C61 MALIGNANT NEOPLASM OF PROSTATE: ICD-10-CM

## 2024-03-13 DIAGNOSIS — Z90.79 ACQUIRED ABSENCE OF OTHER GENITAL ORGAN(S): Chronic | ICD-10-CM

## 2024-03-13 PROCEDURE — 96402 CHEMO HORMON ANTINEOPL SQ/IM: CPT

## 2024-03-13 PROCEDURE — G2211 COMPLEX E/M VISIT ADD ON: CPT

## 2024-03-13 PROCEDURE — 96402U: CUSTOM | Mod: NC

## 2024-03-13 PROCEDURE — 99214 OFFICE O/P EST MOD 30 MIN: CPT | Mod: 25

## 2024-03-13 RX ORDER — LEUPROLIDE ACETATE 30 MG/.5ML
30 INJECTION, SUSPENSION, EXTENDED RELEASE SUBCUTANEOUS DAILY
Qty: 1 | Refills: 0 | Status: COMPLETED | OUTPATIENT
Start: 2024-03-12 | End: 2024-03-13

## 2024-03-13 RX ORDER — LEUPROLIDE ACETATE 30 MG/.5ML
30 INJECTION, SUSPENSION, EXTENDED RELEASE SUBCUTANEOUS
Refills: 0 | Status: COMPLETED | OUTPATIENT
Start: 2024-03-13

## 2024-03-13 RX ADMIN — LEUPROLIDE ACETATE 0 MG: KIT SUBCUTANEOUS at 00:00

## 2024-03-13 NOTE — PHYSICAL EXAM
[Normal Appearance] : normal appearance [General Appearance - In No Acute Distress] : no acute distress [Well Groomed] : well groomed [Edema] : no peripheral edema [Abdomen Soft] : soft [Exaggerated Use Of Accessory Muscles For Inspiration] : no accessory muscle use [Respiration, Rhythm And Depth] : normal respiratory rhythm and effort [Abdomen Tenderness] : non-tender [Costovertebral Angle Tenderness] : no ~M costovertebral angle tenderness [Urinary Bladder Findings] : the bladder was normal on palpation [Normal Station and Gait] : the gait and station were normal for the patient's age [No Focal Deficits] : no focal deficits [] : no rash [Oriented To Time, Place, And Person] : oriented to person, place, and time [Affect] : the affect was normal [Mood] : the mood was normal [No Palpable Adenopathy] : no palpable adenopathy

## 2024-03-14 DIAGNOSIS — C61 MALIGNANT NEOPLASM OF PROSTATE: ICD-10-CM

## 2024-04-03 NOTE — ED ADULT NURSE NOTE - NSIMPLEMENTINTERV_GEN_ALL_ED
Implemented All Universal Safety Interventions:  [place] to call system. Call bell, personal items and telephone within reach. Instruct patient to call for assistance. Room bathroom lighting operational. Non-slip footwear when patient is off stretcher. Physically safe environment: no spills, clutter or unnecessary equipment. Stretcher in lowest position, wheels locked, appropriate side rails in place.
Him/He

## 2024-04-12 NOTE — PATIENT PROFILE ADULT - NSTRANSFERBELONGINGSDISPO_GEN_A_NUR
with patient [FreeTextEntry1] : Patient presents today for follow-up of chronic medical conditions.  [de-identified] : PMHx - HTN, HLD home bp readings as of recently have been elevated 150s/90s consistently  patient otherwise asx - feels well overall   hyperlipidemia --  abn. LFTs on statin in the past - tolerating statin since last visit

## 2024-04-17 ENCOUNTER — OFFICE (OUTPATIENT)
Dept: URBAN - METROPOLITAN AREA CLINIC 104 | Facility: CLINIC | Age: 84
Setting detail: OPHTHALMOLOGY
End: 2024-04-17
Payer: MEDICARE

## 2024-04-17 DIAGNOSIS — H02.831: ICD-10-CM

## 2024-04-17 DIAGNOSIS — H02.834: ICD-10-CM

## 2024-04-17 DIAGNOSIS — H57.813: ICD-10-CM

## 2024-04-17 DIAGNOSIS — H16.223: ICD-10-CM

## 2024-04-17 PROCEDURE — 99024 POSTOP FOLLOW-UP VISIT: CPT | Performed by: OPHTHALMOLOGY

## 2024-04-17 ASSESSMENT — LID POSITION - COMMENTS
OS_COMMENTS: GOOD HEIGHT AND GOOD SYMMETRY
OD_COMMENTS: GOOD HEIGHT AND GOOD SYMMETRY

## 2024-04-17 ASSESSMENT — LID POSITION - DERMATOCHALASIS
OS_DERMATOCHALASIS: ABSENT
OD_DERMATOCHALASIS: ABSENT

## 2024-07-11 ENCOUNTER — APPOINTMENT (OUTPATIENT)
Dept: UROLOGY | Facility: CLINIC | Age: 84
End: 2024-07-11
Payer: MEDICARE

## 2024-07-11 ENCOUNTER — OUTPATIENT (OUTPATIENT)
Dept: OUTPATIENT SERVICES | Facility: HOSPITAL | Age: 84
LOS: 1 days | End: 2024-07-11
Payer: MEDICARE

## 2024-07-11 DIAGNOSIS — R35.0 FREQUENCY OF MICTURITION: ICD-10-CM

## 2024-07-11 DIAGNOSIS — C61 MALIGNANT NEOPLASM OF PROSTATE: ICD-10-CM

## 2024-07-11 LAB
APPEARANCE: CLEAR
BILIRUBIN URINE: NEGATIVE
BLOOD URINE: NEGATIVE
COLOR: NORMAL
EPITHELIAL CELLS: 2 /HPF
KETONES URINE: ABNORMAL MG/DL
LEUKOCYTE ESTERASE URINE: NEGATIVE
MICROSCOPIC-UA: NORMAL
NITRITE URINE: NEGATIVE
PH URINE: 5
PROTEIN URINE: 30 MG/DL
RED BLOOD CELLS URINE: 0 /HPF
SPECIFIC GRAVITY URINE: >1.03
UROBILINOGEN URINE: 0.2 MG/DL
WHITE BLOOD CELLS URINE: 2 /HPF

## 2024-07-11 PROCEDURE — 99214 OFFICE O/P EST MOD 30 MIN: CPT | Mod: 24

## 2024-07-11 PROCEDURE — 96402 CHEMO HORMON ANTINEOPL SQ/IM: CPT

## 2024-07-11 PROCEDURE — 96402U: CUSTOM | Mod: NC

## 2024-07-11 PROCEDURE — G2211 COMPLEX E/M VISIT ADD ON: CPT

## 2024-07-11 RX ORDER — LEUPROLIDE ACETATE 30 MG/.5ML
30 INJECTION, SUSPENSION, EXTENDED RELEASE SUBCUTANEOUS
Refills: 0 | Status: COMPLETED | OUTPATIENT
Start: 2024-07-11

## 2024-07-11 RX ORDER — LEUPROLIDE ACETATE 30 MG/.5ML
30 INJECTION, SUSPENSION, EXTENDED RELEASE SUBCUTANEOUS DAILY
Qty: 1 | Refills: 0 | Status: COMPLETED | OUTPATIENT
Start: 2024-07-11 | End: 2024-07-11

## 2024-07-11 RX ADMIN — LEUPROLIDE ACETATE 0 MG: 30 INJECTION, SUSPENSION, EXTENDED RELEASE SUBCUTANEOUS at 00:00

## 2024-07-12 DIAGNOSIS — C61 MALIGNANT NEOPLASM OF PROSTATE: ICD-10-CM

## 2024-07-12 LAB
PSA FREE FLD-MCNC: 9 %
PSA FREE SERPL-MCNC: 1.61 NG/ML
PSA SERPL-MCNC: 17.2 NG/ML

## 2024-07-31 ENCOUNTER — APPOINTMENT (OUTPATIENT)
Dept: DERMATOLOGY | Facility: CLINIC | Age: 84
End: 2024-07-31
Payer: MEDICARE

## 2024-07-31 DIAGNOSIS — L20.9 ATOPIC DERMATITIS, UNSPECIFIED: ICD-10-CM

## 2024-07-31 DIAGNOSIS — C44.519 BASAL CELL CARCINOMA OF SKIN OF OTHER PART OF TRUNK: ICD-10-CM

## 2024-07-31 DIAGNOSIS — C44.310 BASAL CELL CARCINOMA OF SKIN OF UNSPECIFIED PARTS OF FACE: ICD-10-CM

## 2024-07-31 DIAGNOSIS — L57.0 ACTINIC KERATOSIS: ICD-10-CM

## 2024-07-31 PROCEDURE — 17000 DESTRUCT PREMALG LESION: CPT

## 2024-07-31 PROCEDURE — 99213 OFFICE O/P EST LOW 20 MIN: CPT | Mod: 25

## 2024-07-31 NOTE — HISTORY OF PRESENT ILLNESS
[de-identified] : Pt. presents for skin check; c/o few spots of concern;   c/o growth on face Severity:  mild   Modifying factors:  none Associated symptoms:  none Context:  no association with activity  Recent BCCs Left chest D&C 4/2022;  R lower cheek excised 7/2021

## 2024-07-31 NOTE — PHYSICAL EXAM
[FreeTextEntry3] : Skin examination performed of the face, neck, chest, hands, lower legs; The patient is well, alert and oriented, pleasant and cooperative. Eyelids, conjunctivae, oral mucosa, digits and nails all normal.   No cervical adenopathy.  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

## 2024-07-31 NOTE — ASSESSMENT
[FreeTextEntry1] : Complete skin examination is negative for malignancy; Multiple new concerns were addressed and discussed. Therapeutic options and their risks and benefits; along with multiple diagnostic possibilities were discussed at length; risks and benefits of skin biopsy and/or other further study were discussed;  cryo to AK R cheek  recent BCC x 2 did well  Eczema/stasis; improved; continue aquaphor; clobetasol prn only; discussed proper use at length;  Follow up for TBSE in 6 months.

## 2024-10-18 NOTE — H&P PST ADULT - WEIGHT IN KG
Detail Level: Detailed Quality 130: Documentation Of Current Medications In The Medical Record: Current Medications Documented Quality 402: Tobacco Use And Help With Quitting Among Adolescents: Patient screened for tobacco and never smoked 93.4

## 2024-11-20 ENCOUNTER — APPOINTMENT (OUTPATIENT)
Dept: UROLOGY | Facility: CLINIC | Age: 84
End: 2024-11-20
Payer: MEDICARE

## 2024-11-20 ENCOUNTER — OUTPATIENT (OUTPATIENT)
Dept: OUTPATIENT SERVICES | Facility: HOSPITAL | Age: 84
LOS: 1 days | End: 2024-11-20
Payer: MEDICARE

## 2024-11-20 VITALS
HEART RATE: 59 BPM | OXYGEN SATURATION: 98 % | RESPIRATION RATE: 14 BRPM | DIASTOLIC BLOOD PRESSURE: 78 MMHG | SYSTOLIC BLOOD PRESSURE: 172 MMHG

## 2024-11-20 DIAGNOSIS — C61 MALIGNANT NEOPLASM OF PROSTATE: ICD-10-CM

## 2024-11-20 DIAGNOSIS — R35.0 FREQUENCY OF MICTURITION: ICD-10-CM

## 2024-11-20 DIAGNOSIS — Z90.79 ACQUIRED ABSENCE OF OTHER GENITAL ORGAN(S): Chronic | ICD-10-CM

## 2024-11-20 PROCEDURE — G2211 COMPLEX E/M VISIT ADD ON: CPT

## 2024-11-20 PROCEDURE — 99214 OFFICE O/P EST MOD 30 MIN: CPT | Mod: 57

## 2024-11-20 PROCEDURE — 96402U: CUSTOM | Mod: NC

## 2024-11-20 PROCEDURE — 96402 CHEMO HORMON ANTINEOPL SQ/IM: CPT

## 2024-11-20 RX ORDER — LEUPROLIDE ACETATE 30 MG/.5ML
30 INJECTION, SUSPENSION, EXTENDED RELEASE SUBCUTANEOUS
Refills: 0 | Status: COMPLETED | OUTPATIENT
Start: 2024-11-20

## 2024-11-20 RX ORDER — LEUPROLIDE ACETATE 30 MG/.5ML
30 INJECTION, SUSPENSION, EXTENDED RELEASE SUBCUTANEOUS DAILY
Qty: 1 | Refills: 0 | Status: COMPLETED | OUTPATIENT
Start: 2024-11-19 | End: 2024-11-20

## 2024-11-20 RX ADMIN — LEUPROLIDE ACETATE 0 MG: 30 INJECTION, SUSPENSION, EXTENDED RELEASE SUBCUTANEOUS at 00:00

## 2024-11-21 ENCOUNTER — APPOINTMENT (OUTPATIENT)
Dept: UROLOGY | Facility: CLINIC | Age: 84
End: 2024-11-21

## 2024-11-21 DIAGNOSIS — C61 MALIGNANT NEOPLASM OF PROSTATE: ICD-10-CM

## 2025-01-22 NOTE — CONSULT NOTE ADULT - PROBLEM SELECTOR RECOMMENDATION 9
You were evaluated in the emergency department for difficulty breathing and some confusion or altered mental status. Assessments and testing completed during your visit were reassuring and at this time there is no indication for further testing, treatment or admission to the hospital. Given this it is appropriate to discharge you from the emergency department. At the time of discharge we discussed the following:    Please continue your current therapies for your COPD per your pulmonary doctor and follow-up this visit to the emerge department with both your pulmonary doctor and primary provider.  Please use caution when using pain medications as they can be sedating and can compromise your breathing as I am worried this may have contributed to your presenting condition    Please note that sometimes it is difficult to diagnose a medical condition early in the disease process before the disease is fully manifest. Because of this, should you develop any new or worsening symptoms, you may return at any time to the emergency department for another evaluation. If available you are also recommended to review this visit with your primary care physician or other medical provider in the next 7 days. Thank you for allowing us to care for you today.    
Patient with obstructive uropathy as demonstrated by CT scan and ultrasound which shows hydronephrosis. Patient also with hematuria for which urology placed 3-way Lopez catheter. Creatinine currently stable at 10.50 and CBI draining light pink urine. UA shows 1.2 grams of proteinuria as well however would recheck after acute injury is resolved and can send proteinuria worked up as needed. Otherwise avoid nephrotoxic agents, renally dose all medications, and maintain adequate perfusion pressures. No indication for dialysis currently.

## 2025-02-20 ENCOUNTER — APPOINTMENT (OUTPATIENT)
Dept: DERMATOLOGY | Facility: CLINIC | Age: 85
End: 2025-02-20
Payer: MEDICARE

## 2025-02-20 DIAGNOSIS — L81.4 OTHER MELANIN HYPERPIGMENTATION: ICD-10-CM

## 2025-02-20 DIAGNOSIS — I87.2 VENOUS INSUFFICIENCY (CHRONIC) (PERIPHERAL): ICD-10-CM

## 2025-02-20 DIAGNOSIS — L57.0 ACTINIC KERATOSIS: ICD-10-CM

## 2025-02-20 DIAGNOSIS — C44.310 BASAL CELL CARCINOMA OF SKIN OF UNSPECIFIED PARTS OF FACE: ICD-10-CM

## 2025-02-20 DIAGNOSIS — L28.1 PRURIGO NODULARIS: ICD-10-CM

## 2025-02-20 PROCEDURE — 17000 DESTRUCT PREMALG LESION: CPT

## 2025-02-20 PROCEDURE — 99213 OFFICE O/P EST LOW 20 MIN: CPT | Mod: 25

## 2025-03-26 ENCOUNTER — APPOINTMENT (OUTPATIENT)
Dept: UROLOGY | Facility: CLINIC | Age: 85
End: 2025-03-26
Payer: MEDICARE

## 2025-03-26 ENCOUNTER — OUTPATIENT (OUTPATIENT)
Dept: OUTPATIENT SERVICES | Facility: HOSPITAL | Age: 85
LOS: 1 days | End: 2025-03-26
Payer: MEDICARE

## 2025-03-26 VITALS
RESPIRATION RATE: 14 BRPM | OXYGEN SATURATION: 99 % | HEART RATE: 53 BPM | SYSTOLIC BLOOD PRESSURE: 152 MMHG | DIASTOLIC BLOOD PRESSURE: 74 MMHG

## 2025-03-26 VITALS
DIASTOLIC BLOOD PRESSURE: 81 MMHG | OXYGEN SATURATION: 99 % | RESPIRATION RATE: 14 BRPM | HEART RATE: 50 BPM | SYSTOLIC BLOOD PRESSURE: 151 MMHG

## 2025-03-26 DIAGNOSIS — C61 MALIGNANT NEOPLASM OF PROSTATE: ICD-10-CM

## 2025-03-26 DIAGNOSIS — R35.0 FREQUENCY OF MICTURITION: ICD-10-CM

## 2025-03-26 DIAGNOSIS — Z90.79 ACQUIRED ABSENCE OF OTHER GENITAL ORGAN(S): Chronic | ICD-10-CM

## 2025-03-26 PROCEDURE — 96402U: CUSTOM | Mod: NC

## 2025-03-26 PROCEDURE — 96402 CHEMO HORMON ANTINEOPL SQ/IM: CPT

## 2025-03-26 RX ORDER — LEUPROLIDE ACETATE 30 MG/.5ML
30 INJECTION, SUSPENSION, EXTENDED RELEASE SUBCUTANEOUS DAILY
Qty: 1 | Refills: 0 | Status: COMPLETED | OUTPATIENT
Start: 2025-03-25 | End: 2025-03-26

## 2025-03-26 RX ORDER — LEUPROLIDE ACETATE 30 MG/.5ML
30 INJECTION, SUSPENSION, EXTENDED RELEASE SUBCUTANEOUS
Refills: 0 | Status: COMPLETED | OUTPATIENT
Start: 2025-03-26

## 2025-03-26 RX ADMIN — LEUPROLIDE ACETATE 0 MG: 30 INJECTION, SUSPENSION, EXTENDED RELEASE SUBCUTANEOUS at 00:00

## 2025-03-27 LAB
PSA FREE FLD-MCNC: 13 %
PSA FREE SERPL-MCNC: 4.3 NG/ML
PSA SERPL-MCNC: 32 NG/ML

## 2025-03-28 DIAGNOSIS — C61 MALIGNANT NEOPLASM OF PROSTATE: ICD-10-CM

## 2025-04-15 NOTE — H&P PST ADULT - NS SC CAGE ALCOHOL CUT DOWN
Thank you for coming to your visit today. As we discussed you kidney function is normal, your creatinine is 1mg/dL.  Your electrolytes are normal. Please follow the recommendations below       Recommend low sodium (salt) food    Avoid nonsteroidal anti-inflammatory drugs such as Naprosyn, ibuprofen, Aleve, Advil, Celebrex, Meloxicam (Mobic) etc.  You can use Tylenol as needed if you do not have any liver condition to be concerned about      Try to exercise at least 30 minutes 3 days a week to begin with with an ultimate goal of 5 days a week for at least 30 minutes  Start nifedipine 60mg in the morning, take Losartan at bedtime   24h BP monitor   VAS renal     Blood Pressure Measurement Instructions:  Take the morning readings before any medications and when yo are relaxed for several minutes   Take the evening readings between 7pm and 10pm at least 30 minutes before or after dinner/eating/coffee or alcohol intake and certainly before any blood pressure medications,   Please include heart rate with your blood pressure readings   When taking standing readings, keep your arm supported at heart level and not dangling  Make sure you are sitting with your back supported and feet on the ground and do not cross your legs or feet  Make sure you have not taken any coffee or caffeine products or exercised or smoke cigarettes at least 30 minutes before taking your blood pressure      Next Visit in 4-5 months with results   If you need to see us earlier we can change the appointment for you        Joselyn Reyes Bahamonde, MD  Nephrology Attending           Joselyn Reyes Bahamonde, MD  Nephrology Attending      no

## 2025-08-27 ENCOUNTER — APPOINTMENT (OUTPATIENT)
Dept: UROLOGY | Facility: CLINIC | Age: 85
End: 2025-08-27
Payer: MEDICARE

## 2025-08-27 ENCOUNTER — OUTPATIENT (OUTPATIENT)
Dept: OUTPATIENT SERVICES | Facility: HOSPITAL | Age: 85
LOS: 1 days | End: 2025-08-27
Payer: MEDICARE

## 2025-08-27 DIAGNOSIS — C61 MALIGNANT NEOPLASM OF PROSTATE: ICD-10-CM

## 2025-08-27 DIAGNOSIS — Z90.79 ACQUIRED ABSENCE OF OTHER GENITAL ORGAN(S): Chronic | ICD-10-CM

## 2025-08-27 DIAGNOSIS — R35.0 FREQUENCY OF MICTURITION: ICD-10-CM

## 2025-08-27 PROCEDURE — 96402 CHEMO HORMON ANTINEOPL SQ/IM: CPT

## 2025-08-27 PROCEDURE — 99214 OFFICE O/P EST MOD 30 MIN: CPT | Mod: 25

## 2025-08-27 PROCEDURE — 96402U: CUSTOM | Mod: NC

## 2025-08-27 RX ORDER — LEUPROLIDE ACETATE 30 MG/.5ML
30 INJECTION, SUSPENSION, EXTENDED RELEASE SUBCUTANEOUS
Refills: 0 | Status: COMPLETED | OUTPATIENT
Start: 2025-08-27

## 2025-08-27 RX ORDER — LEUPROLIDE ACETATE 30 MG/.5ML
30 INJECTION, SUSPENSION, EXTENDED RELEASE SUBCUTANEOUS DAILY
Qty: 1 | Refills: 0 | Status: COMPLETED | OUTPATIENT
Start: 2025-08-26 | End: 2025-08-27

## 2025-08-27 RX ADMIN — LEUPROLIDE ACETATE 0 MG: 30 INJECTION, SUSPENSION, EXTENDED RELEASE SUBCUTANEOUS at 00:00

## 2025-08-28 DIAGNOSIS — C61 MALIGNANT NEOPLASM OF PROSTATE: ICD-10-CM

## 2025-08-28 LAB
PSA FREE FLD-MCNC: 21 %
PSA FREE SERPL-MCNC: 14.2 NG/ML
PSA SERPL-MCNC: 68.5 NG/ML

## 2025-09-03 ENCOUNTER — APPOINTMENT (OUTPATIENT)
Dept: DERMATOLOGY | Facility: CLINIC | Age: 85
End: 2025-09-03
Payer: MEDICARE

## 2025-09-03 DIAGNOSIS — L20.9 ATOPIC DERMATITIS, UNSPECIFIED: ICD-10-CM

## 2025-09-03 DIAGNOSIS — L57.0 ACTINIC KERATOSIS: ICD-10-CM

## 2025-09-03 DIAGNOSIS — C44.519 BASAL CELL CARCINOMA OF SKIN OF OTHER PART OF TRUNK: ICD-10-CM

## 2025-09-03 DIAGNOSIS — I87.2 VENOUS INSUFFICIENCY (CHRONIC) (PERIPHERAL): ICD-10-CM

## 2025-09-03 DIAGNOSIS — L81.4 OTHER MELANIN HYPERPIGMENTATION: ICD-10-CM

## 2025-09-03 DIAGNOSIS — L28.1 PRURIGO NODULARIS: ICD-10-CM

## 2025-09-03 DIAGNOSIS — C44.310 BASAL CELL CARCINOMA OF SKIN OF UNSPECIFIED PARTS OF FACE: ICD-10-CM

## 2025-09-03 PROCEDURE — G2211 COMPLEX E/M VISIT ADD ON: CPT

## 2025-09-03 PROCEDURE — 99214 OFFICE O/P EST MOD 30 MIN: CPT
